# Patient Record
Sex: MALE | Race: WHITE | NOT HISPANIC OR LATINO | Employment: OTHER | URBAN - METROPOLITAN AREA
[De-identification: names, ages, dates, MRNs, and addresses within clinical notes are randomized per-mention and may not be internally consistent; named-entity substitution may affect disease eponyms.]

---

## 2023-10-20 RX ORDER — CREAM BASE NO.31
CREAM (GRAM) MISCELLANEOUS
Qty: 180 G | Refills: 11 | OUTPATIENT
Start: 2023-10-20

## 2024-06-07 ENCOUNTER — HOSPITAL ENCOUNTER (INPATIENT)
Facility: HOSPITAL | Age: 69
End: 2024-06-07
Attending: EMERGENCY MEDICINE | Admitting: SURGERY
Payer: MEDICARE

## 2024-06-07 ENCOUNTER — APPOINTMENT (OUTPATIENT)
Dept: RADIOLOGY | Facility: HOSPITAL | Age: 69
End: 2024-06-07
Payer: MEDICARE

## 2024-06-07 DIAGNOSIS — K35.30 ACUTE APPENDICITIS WITH LOCALIZED PERITONITIS, WITHOUT PERFORATION, ABSCESS, OR GANGRENE: ICD-10-CM

## 2024-06-07 DIAGNOSIS — I10 PRIMARY HYPERTENSION: ICD-10-CM

## 2024-06-07 DIAGNOSIS — K35.30 ACUTE APPENDICITIS WITH LOCALIZED PERITONITIS, WITHOUT PERFORATION OR ABSCESS, UNSPECIFIED WHETHER GANGRENE PRESENT: ICD-10-CM

## 2024-06-07 DIAGNOSIS — R10.84 ABDOMINAL PAIN, GENERALIZED: Primary | ICD-10-CM

## 2024-06-07 LAB
ALBUMIN SERPL BCP-MCNC: 4.3 G/DL (ref 3.4–5)
ALP SERPL-CCNC: 114 U/L (ref 33–136)
ALT SERPL W P-5'-P-CCNC: 12 U/L (ref 10–52)
ANION GAP SERPL CALC-SCNC: 11 MMOL/L (ref 10–20)
AST SERPL W P-5'-P-CCNC: 13 U/L (ref 9–39)
BASOPHILS # BLD AUTO: 0.03 X10*3/UL (ref 0–0.1)
BASOPHILS NFR BLD AUTO: 0.2 %
BILIRUB DIRECT SERPL-MCNC: 0.2 MG/DL (ref 0–0.3)
BILIRUB SERPL-MCNC: 1.1 MG/DL (ref 0–1.2)
BUN SERPL-MCNC: 14 MG/DL (ref 6–23)
CALCIUM SERPL-MCNC: 9.5 MG/DL (ref 8.6–10.3)
CHLORIDE SERPL-SCNC: 105 MMOL/L (ref 98–107)
CO2 SERPL-SCNC: 26 MMOL/L (ref 21–32)
CREAT SERPL-MCNC: 1.2 MG/DL (ref 0.5–1.3)
EGFRCR SERPLBLD CKD-EPI 2021: 66 ML/MIN/1.73M*2
EOSINOPHIL # BLD AUTO: 0.09 X10*3/UL (ref 0–0.7)
EOSINOPHIL NFR BLD AUTO: 0.7 %
ERYTHROCYTE [DISTWIDTH] IN BLOOD BY AUTOMATED COUNT: 13.3 % (ref 11.5–14.5)
GLUCOSE SERPL-MCNC: 105 MG/DL (ref 74–99)
HCT VFR BLD AUTO: 50.9 % (ref 41–52)
HGB BLD-MCNC: 17.3 G/DL (ref 13.5–17.5)
IMM GRANULOCYTES # BLD AUTO: 0.03 X10*3/UL (ref 0–0.7)
IMM GRANULOCYTES NFR BLD AUTO: 0.2 % (ref 0–0.9)
LIPASE SERPL-CCNC: 34 U/L (ref 9–82)
LYMPHOCYTES # BLD AUTO: 1.28 X10*3/UL (ref 1.2–4.8)
LYMPHOCYTES NFR BLD AUTO: 9.6 %
MCH RBC QN AUTO: 30.8 PG (ref 26–34)
MCHC RBC AUTO-ENTMCNC: 34 G/DL (ref 32–36)
MCV RBC AUTO: 91 FL (ref 80–100)
MONOCYTES # BLD AUTO: 0.69 X10*3/UL (ref 0.1–1)
MONOCYTES NFR BLD AUTO: 5.2 %
NEUTROPHILS # BLD AUTO: 11.16 X10*3/UL (ref 1.2–7.7)
NEUTROPHILS NFR BLD AUTO: 84.1 %
NRBC BLD-RTO: 0 /100 WBCS (ref 0–0)
PLATELET # BLD AUTO: 114 X10*3/UL (ref 150–450)
POTASSIUM SERPL-SCNC: 4.3 MMOL/L (ref 3.5–5.3)
PROT SERPL-MCNC: 7.4 G/DL (ref 6.4–8.2)
RBC # BLD AUTO: 5.61 X10*6/UL (ref 4.5–5.9)
SODIUM SERPL-SCNC: 138 MMOL/L (ref 136–145)
WBC # BLD AUTO: 13.3 X10*3/UL (ref 4.4–11.3)

## 2024-06-07 PROCEDURE — 2500000001 HC RX 250 WO HCPCS SELF ADMINISTERED DRUGS (ALT 637 FOR MEDICARE OP): Performed by: EMERGENCY MEDICINE

## 2024-06-07 PROCEDURE — 99285 EMERGENCY DEPT VISIT HI MDM: CPT | Mod: 25

## 2024-06-07 PROCEDURE — 80053 COMPREHEN METABOLIC PANEL: CPT | Performed by: EMERGENCY MEDICINE

## 2024-06-07 PROCEDURE — 2550000001 HC RX 255 CONTRASTS: Performed by: EMERGENCY MEDICINE

## 2024-06-07 PROCEDURE — 74177 CT ABD & PELVIS W/CONTRAST: CPT

## 2024-06-07 PROCEDURE — 36415 COLL VENOUS BLD VENIPUNCTURE: CPT | Performed by: EMERGENCY MEDICINE

## 2024-06-07 PROCEDURE — 99221 1ST HOSP IP/OBS SF/LOW 40: CPT | Performed by: SURGERY

## 2024-06-07 PROCEDURE — 2500000004 HC RX 250 GENERAL PHARMACY W/ HCPCS (ALT 636 FOR OP/ED): Performed by: EMERGENCY MEDICINE

## 2024-06-07 PROCEDURE — 96375 TX/PRO/DX INJ NEW DRUG ADDON: CPT

## 2024-06-07 PROCEDURE — 96372 THER/PROPH/DIAG INJ SC/IM: CPT | Performed by: SURGERY

## 2024-06-07 PROCEDURE — 82947 ASSAY GLUCOSE BLOOD QUANT: CPT

## 2024-06-07 PROCEDURE — 2500000004 HC RX 250 GENERAL PHARMACY W/ HCPCS (ALT 636 FOR OP/ED): Performed by: SURGERY

## 2024-06-07 PROCEDURE — 74177 CT ABD & PELVIS W/CONTRAST: CPT | Performed by: STUDENT IN AN ORGANIZED HEALTH CARE EDUCATION/TRAINING PROGRAM

## 2024-06-07 PROCEDURE — 96361 HYDRATE IV INFUSION ADD-ON: CPT

## 2024-06-07 PROCEDURE — 96365 THER/PROPH/DIAG IV INF INIT: CPT | Mod: 59

## 2024-06-07 PROCEDURE — 82248 BILIRUBIN DIRECT: CPT | Performed by: EMERGENCY MEDICINE

## 2024-06-07 PROCEDURE — 83690 ASSAY OF LIPASE: CPT | Performed by: EMERGENCY MEDICINE

## 2024-06-07 PROCEDURE — 85025 COMPLETE CBC W/AUTO DIFF WBC: CPT | Performed by: EMERGENCY MEDICINE

## 2024-06-07 RX ORDER — METOPROLOL TARTRATE 50 MG/1
50 TABLET ORAL 2 TIMES DAILY
Status: DISCONTINUED | OUTPATIENT
Start: 2024-06-07 | End: 2024-06-08

## 2024-06-07 RX ORDER — OXYCODONE HYDROCHLORIDE 10 MG/1
10 TABLET ORAL EVERY 4 HOURS PRN
Status: DISCONTINUED | OUTPATIENT
Start: 2024-06-07 | End: 2024-06-08

## 2024-06-07 RX ORDER — MORPHINE SULFATE 4 MG/ML
4 INJECTION INTRAVENOUS ONCE
Status: COMPLETED | OUTPATIENT
Start: 2024-06-07 | End: 2024-06-07

## 2024-06-07 RX ORDER — SODIUM CHLORIDE, SODIUM LACTATE, POTASSIUM CHLORIDE, CALCIUM CHLORIDE 600; 310; 30; 20 MG/100ML; MG/100ML; MG/100ML; MG/100ML
75 INJECTION, SOLUTION INTRAVENOUS CONTINUOUS
Status: DISCONTINUED | OUTPATIENT
Start: 2024-06-07 | End: 2024-06-09

## 2024-06-07 RX ORDER — INSULIN LISPRO 100 [IU]/ML
0-10 INJECTION, SOLUTION INTRAVENOUS; SUBCUTANEOUS EVERY 4 HOURS
Status: DISCONTINUED | OUTPATIENT
Start: 2024-06-07 | End: 2024-06-09

## 2024-06-07 RX ORDER — DEXTROSE 50 % IN WATER (D50W) INTRAVENOUS SYRINGE
25
Status: DISCONTINUED | OUTPATIENT
Start: 2024-06-07 | End: 2024-06-09

## 2024-06-07 RX ORDER — ONDANSETRON HYDROCHLORIDE 2 MG/ML
4 INJECTION, SOLUTION INTRAVENOUS ONCE
Status: COMPLETED | OUTPATIENT
Start: 2024-06-07 | End: 2024-06-07

## 2024-06-07 RX ORDER — HEPARIN SODIUM 5000 [USP'U]/ML
5000 INJECTION, SOLUTION INTRAVENOUS; SUBCUTANEOUS EVERY 8 HOURS
Status: DISCONTINUED | OUTPATIENT
Start: 2024-06-07 | End: 2024-06-09

## 2024-06-07 RX ORDER — TAMSULOSIN HYDROCHLORIDE 0.4 MG/1
0.4 CAPSULE ORAL DAILY
Status: DISCONTINUED | OUTPATIENT
Start: 2024-06-08 | End: 2024-06-08

## 2024-06-07 RX ORDER — METOPROLOL TARTRATE 1 MG/ML
5 INJECTION, SOLUTION INTRAVENOUS EVERY 6 HOURS PRN
Status: DISCONTINUED | OUTPATIENT
Start: 2024-06-07 | End: 2024-06-08

## 2024-06-07 RX ORDER — SPIRONOLACTONE 25 MG/1
25 TABLET ORAL DAILY
Status: DISCONTINUED | OUTPATIENT
Start: 2024-06-08 | End: 2024-06-08

## 2024-06-07 RX ORDER — ONDANSETRON HYDROCHLORIDE 2 MG/ML
4 INJECTION, SOLUTION INTRAVENOUS EVERY 6 HOURS PRN
Status: DISCONTINUED | OUTPATIENT
Start: 2024-06-07 | End: 2024-06-13 | Stop reason: HOSPADM

## 2024-06-07 RX ORDER — METOPROLOL TARTRATE 50 MG/1
50 TABLET ORAL ONCE
Status: COMPLETED | OUTPATIENT
Start: 2024-06-07 | End: 2024-06-07

## 2024-06-07 RX ORDER — OXYCODONE HYDROCHLORIDE 5 MG/1
5 TABLET ORAL EVERY 4 HOURS PRN
Status: DISCONTINUED | OUTPATIENT
Start: 2024-06-07 | End: 2024-06-08

## 2024-06-07 RX ORDER — ACETAMINOPHEN 325 MG/1
975 TABLET ORAL EVERY 8 HOURS
Status: DISCONTINUED | OUTPATIENT
Start: 2024-06-07 | End: 2024-06-08

## 2024-06-07 RX ORDER — HYDRALAZINE HYDROCHLORIDE 20 MG/ML
5 INJECTION INTRAMUSCULAR; INTRAVENOUS EVERY 6 HOURS PRN
Status: DISCONTINUED | OUTPATIENT
Start: 2024-06-07 | End: 2024-06-08

## 2024-06-07 RX ORDER — DEXTROSE 50 % IN WATER (D50W) INTRAVENOUS SYRINGE
12.5
Status: DISCONTINUED | OUTPATIENT
Start: 2024-06-07 | End: 2024-06-09

## 2024-06-07 RX ADMIN — SODIUM CHLORIDE, POTASSIUM CHLORIDE, SODIUM LACTATE AND CALCIUM CHLORIDE 1000 ML: 600; 310; 30; 20 INJECTION, SOLUTION INTRAVENOUS at 23:17

## 2024-06-07 RX ADMIN — METOPROLOL TARTRATE 50 MG: 50 TABLET, FILM COATED ORAL at 22:22

## 2024-06-07 RX ADMIN — ONDANSETRON 4 MG: 2 INJECTION INTRAMUSCULAR; INTRAVENOUS at 19:35

## 2024-06-07 RX ADMIN — MORPHINE SULFATE 4 MG: 4 INJECTION INTRAVENOUS at 19:36

## 2024-06-07 RX ADMIN — HYDROMORPHONE HYDROCHLORIDE 0.4 MG: 1 INJECTION, SOLUTION INTRAMUSCULAR; INTRAVENOUS; SUBCUTANEOUS at 23:17

## 2024-06-07 RX ADMIN — IOHEXOL 100 ML: 350 INJECTION, SOLUTION INTRAVENOUS at 20:23

## 2024-06-07 RX ADMIN — PIPERACILLIN SODIUM AND TAZOBACTAM SODIUM 3.38 G: 3; .375 INJECTION, SOLUTION INTRAVENOUS at 21:30

## 2024-06-07 RX ADMIN — HEPARIN SODIUM 5000 UNITS: 5000 INJECTION INTRAVENOUS; SUBCUTANEOUS at 23:46

## 2024-06-07 RX ADMIN — ACETAMINOPHEN 975 MG: 325 TABLET ORAL at 23:47

## 2024-06-07 ASSESSMENT — COLUMBIA-SUICIDE SEVERITY RATING SCALE - C-SSRS
1. IN THE PAST MONTH, HAVE YOU WISHED YOU WERE DEAD OR WISHED YOU COULD GO TO SLEEP AND NOT WAKE UP?: NO
2. HAVE YOU ACTUALLY HAD ANY THOUGHTS OF KILLING YOURSELF?: NO
6. HAVE YOU EVER DONE ANYTHING, STARTED TO DO ANYTHING, OR PREPARED TO DO ANYTHING TO END YOUR LIFE?: NO

## 2024-06-07 ASSESSMENT — LIFESTYLE VARIABLES
TOTAL SCORE: 0
EVER FELT BAD OR GUILTY ABOUT YOUR DRINKING: NO
HAVE YOU EVER FELT YOU SHOULD CUT DOWN ON YOUR DRINKING: NO
EVER HAD A DRINK FIRST THING IN THE MORNING TO STEADY YOUR NERVES TO GET RID OF A HANGOVER: NO
HAVE PEOPLE ANNOYED YOU BY CRITICIZING YOUR DRINKING: NO

## 2024-06-07 ASSESSMENT — PAIN DESCRIPTION - LOCATION: LOCATION: ABDOMEN

## 2024-06-07 ASSESSMENT — PAIN SCALES - GENERAL
PAINLEVEL_OUTOF10: 6
PAINLEVEL_OUTOF10: 4
PAINLEVEL_OUTOF10: 0 - NO PAIN

## 2024-06-07 ASSESSMENT — PAIN - FUNCTIONAL ASSESSMENT
PAIN_FUNCTIONAL_ASSESSMENT: 0-10
PAIN_FUNCTIONAL_ASSESSMENT: 0-10

## 2024-06-07 ASSESSMENT — PAIN DESCRIPTION - PROGRESSION: CLINICAL_PROGRESSION: GRADUALLY IMPROVING

## 2024-06-07 NOTE — ED PROVIDER NOTES
HPI   Chief Complaint   Patient presents with    Abdominal Pain     Constipation, nausea    Hypertension    Shortness of Breath     All sx started today,        Patient presents with abdominal pain.  It started earlier this morning.  He describes diffuse abdominal pain and cramping.  He feels distended.  He has had nausea without any vomiting.  He states his bowel movements have slowed down from normal.  He took some stool softeners last night and this morning.  He admits to having a small bowel movement when he checked into the hospital here today.  He has a history of a small bowel obstruction and had surgery at [facility] to have bowel removed.  I did review the discharge summary from December 2021.  He was admitted for retroperitoneal hemorrhage at that time.  He denies any recent fevers.                          Cassi Coma Scale Score: 15                  Patient History   Past Medical History:   Diagnosis Date    Aortic aneurysm of unspecified site, without rupture (CMS-HCC)     Aneurysm of descending aorta    Essential (primary) hypertension 12/04/2013    Benign essential hypertension    Personal history of malignant neoplasm, unspecified     History of malignant neoplasm    Personal history of other diseases of the circulatory system     History of hypertension    Personal history of other endocrine, nutritional and metabolic disease     History of high cholesterol    Personal history of other specified conditions 06/18/2018    History of gross hematuria    Type 2 diabetes mellitus without complications (Multi) 12/04/2013    Diabetes mellitus     Past Surgical History:   Procedure Laterality Date    HERNIA REPAIR  06/18/2018    Hernia Repair    OTHER SURGICAL HISTORY  10/04/2021    Gallbladder surgery    OTHER SURGICAL HISTORY  10/04/2021    Abdominal surgery    OTHER SURGICAL HISTORY  10/21/2022    Small bowel resection     No family history on file.  Social History     Tobacco Use     Smoking status: Not on file    Smokeless tobacco: Not on file   Substance Use Topics    Alcohol use: Not on file    Drug use: Not on file       Physical Exam   ED Triage Vitals [06/07/24 1906]   Temperature Heart Rate Respirations BP   36.4 °C (97.5 °F) 70 18 (!) 213/99      Pulse Ox Temp src Heart Rate Source Patient Position   97 % -- -- --      BP Location FiO2 (%)     -- --       Physical Exam  Vitals and nursing note reviewed.   Constitutional:       Appearance: Normal appearance.   HENT:      Head: Normocephalic and atraumatic.      Mouth/Throat:      Mouth: Mucous membranes are moist.   Eyes:      Extraocular Movements: Extraocular movements intact.      Pupils: Pupils are equal, round, and reactive to light.   Cardiovascular:      Rate and Rhythm: Normal rate and regular rhythm.      Heart sounds: No murmur heard.  Pulmonary:      Effort: Pulmonary effort is normal. No respiratory distress.      Breath sounds: Normal breath sounds.   Abdominal:      General: There is no distension.      Palpations: Abdomen is soft.      Tenderness: There is generalized abdominal tenderness. There is no guarding or rebound.   Musculoskeletal:         General: No tenderness or deformity. Normal range of motion.      Cervical back: Neck supple.      Right lower leg: No edema.      Left lower leg: No edema.   Skin:     General: Skin is warm and dry.      Findings: No lesion or rash.   Neurological:      General: No focal deficit present.      Mental Status: He is alert and oriented to person, place, and time.      Sensory: No sensory deficit.      Motor: No weakness.   Psychiatric:         Behavior: Behavior normal.       Labs Reviewed   LIPASE   HEPATIC FUNCTION PANEL   BASIC METABOLIC PANEL   CBC WITH AUTO DIFFERENTIAL     CT abdomen pelvis w IV contrast    (Results Pending)     ED Course & MDM   Diagnoses as of 06/07/24 2046   Abdominal pain, generalized       Medical Decision Making  Differentials include small bowel  obstruction, gastroenteritis, unspecified abdominal pain, retroperitoneal hematoma.  Imaging studies, if performed, were independently reviewed and interpreted by myself and confirmed by radiologist. EKG(s), if performed, were interpreted by myself.Patient was given morphine and Zofran for pain control.  Laboratory studies were obtained shows a white count of 13.3, otherwise unremarkable.  CAT scan of the abdomen pelvis was obtained and is currently pending.  Patient will be reevaluated by the oncoming physician.        Procedure  Procedures     Dion De Leon MD  06/07/24 2055

## 2024-06-08 ENCOUNTER — ANESTHESIA (OUTPATIENT)
Dept: OPERATING ROOM | Facility: HOSPITAL | Age: 69
End: 2024-06-08
Payer: MEDICARE

## 2024-06-08 ENCOUNTER — APPOINTMENT (OUTPATIENT)
Dept: CARDIOLOGY | Facility: HOSPITAL | Age: 69
End: 2024-06-08
Payer: MEDICARE

## 2024-06-08 ENCOUNTER — APPOINTMENT (OUTPATIENT)
Dept: RADIOLOGY | Facility: HOSPITAL | Age: 69
End: 2024-06-08
Payer: MEDICARE

## 2024-06-08 ENCOUNTER — ANESTHESIA EVENT (OUTPATIENT)
Dept: OPERATING ROOM | Facility: HOSPITAL | Age: 69
End: 2024-06-08
Payer: MEDICARE

## 2024-06-08 PROBLEM — I10 PRIMARY HYPERTENSION: Status: ACTIVE | Noted: 2024-06-08

## 2024-06-08 PROBLEM — K44.9 HIATAL HERNIA: Status: ACTIVE | Noted: 2024-06-08

## 2024-06-08 PROBLEM — R10.84 ABDOMINAL PAIN, GENERALIZED: Status: ACTIVE | Noted: 2024-06-08

## 2024-06-08 PROBLEM — G47.33 OSA (OBSTRUCTIVE SLEEP APNEA): Status: ACTIVE | Noted: 2024-06-08

## 2024-06-08 PROBLEM — E11.65 TYPE 2 DIABETES MELLITUS WITH HYPERGLYCEMIA (MULTI): Status: ACTIVE | Noted: 2024-06-08

## 2024-06-08 LAB
ANION GAP SERPL CALC-SCNC: 12 MMOL/L (ref 10–20)
ANION GAP SERPL CALC-SCNC: 14 MMOL/L (ref 10–20)
BASOPHILS # BLD AUTO: 0.03 X10*3/UL (ref 0–0.1)
BASOPHILS # BLD AUTO: 0.05 X10*3/UL (ref 0–0.1)
BASOPHILS NFR BLD AUTO: 0.2 %
BASOPHILS NFR BLD AUTO: 0.3 %
BUN SERPL-MCNC: 14 MG/DL (ref 6–23)
BUN SERPL-MCNC: 15 MG/DL (ref 6–23)
CALCIUM SERPL-MCNC: 8.2 MG/DL (ref 8.6–10.3)
CALCIUM SERPL-MCNC: 8.3 MG/DL (ref 8.6–10.3)
CHLORIDE SERPL-SCNC: 103 MMOL/L (ref 98–107)
CHLORIDE SERPL-SCNC: 105 MMOL/L (ref 98–107)
CO2 SERPL-SCNC: 23 MMOL/L (ref 21–32)
CO2 SERPL-SCNC: 23 MMOL/L (ref 21–32)
CREAT SERPL-MCNC: 1.31 MG/DL (ref 0.5–1.3)
CREAT SERPL-MCNC: 1.55 MG/DL (ref 0.5–1.3)
EGFRCR SERPLBLD CKD-EPI 2021: 49 ML/MIN/1.73M*2
EGFRCR SERPLBLD CKD-EPI 2021: 60 ML/MIN/1.73M*2
EOSINOPHIL # BLD AUTO: 0 X10*3/UL (ref 0–0.7)
EOSINOPHIL # BLD AUTO: 0.02 X10*3/UL (ref 0–0.7)
EOSINOPHIL NFR BLD AUTO: 0 %
EOSINOPHIL NFR BLD AUTO: 0.1 %
ERYTHROCYTE [DISTWIDTH] IN BLOOD BY AUTOMATED COUNT: 13.4 % (ref 11.5–14.5)
ERYTHROCYTE [DISTWIDTH] IN BLOOD BY AUTOMATED COUNT: 13.6 % (ref 11.5–14.5)
GLUCOSE BLD MANUAL STRIP-MCNC: 141 MG/DL (ref 74–99)
GLUCOSE BLD MANUAL STRIP-MCNC: 148 MG/DL (ref 74–99)
GLUCOSE BLD MANUAL STRIP-MCNC: 156 MG/DL (ref 74–99)
GLUCOSE BLD MANUAL STRIP-MCNC: 209 MG/DL (ref 74–99)
GLUCOSE SERPL-MCNC: 153 MG/DL (ref 74–99)
GLUCOSE SERPL-MCNC: 200 MG/DL (ref 74–99)
HCT VFR BLD AUTO: 44.3 % (ref 41–52)
HCT VFR BLD AUTO: 48.5 % (ref 41–52)
HGB BLD-MCNC: 15.2 G/DL (ref 13.5–17.5)
HGB BLD-MCNC: 16.2 G/DL (ref 13.5–17.5)
IMM GRANULOCYTES # BLD AUTO: 0.06 X10*3/UL (ref 0–0.7)
IMM GRANULOCYTES # BLD AUTO: 0.08 X10*3/UL (ref 0–0.7)
IMM GRANULOCYTES NFR BLD AUTO: 0.4 % (ref 0–0.9)
IMM GRANULOCYTES NFR BLD AUTO: 0.5 % (ref 0–0.9)
LYMPHOCYTES # BLD AUTO: 0.63 X10*3/UL (ref 1.2–4.8)
LYMPHOCYTES # BLD AUTO: 0.87 X10*3/UL (ref 1.2–4.8)
LYMPHOCYTES NFR BLD AUTO: 4.1 %
LYMPHOCYTES NFR BLD AUTO: 6.5 %
MAGNESIUM SERPL-MCNC: 1.62 MG/DL (ref 1.6–2.4)
MAGNESIUM SERPL-MCNC: 1.71 MG/DL (ref 1.6–2.4)
MCH RBC QN AUTO: 30.6 PG (ref 26–34)
MCH RBC QN AUTO: 30.9 PG (ref 26–34)
MCHC RBC AUTO-ENTMCNC: 33.4 G/DL (ref 32–36)
MCHC RBC AUTO-ENTMCNC: 34.3 G/DL (ref 32–36)
MCV RBC AUTO: 89 FL (ref 80–100)
MCV RBC AUTO: 92 FL (ref 80–100)
MONOCYTES # BLD AUTO: 0.73 X10*3/UL (ref 0.1–1)
MONOCYTES # BLD AUTO: 0.98 X10*3/UL (ref 0.1–1)
MONOCYTES NFR BLD AUTO: 5.4 %
MONOCYTES NFR BLD AUTO: 6.4 %
NEUTROPHILS # BLD AUTO: 11.74 X10*3/UL (ref 1.2–7.7)
NEUTROPHILS # BLD AUTO: 13.5 X10*3/UL (ref 1.2–7.7)
NEUTROPHILS NFR BLD AUTO: 87.5 %
NEUTROPHILS NFR BLD AUTO: 88.6 %
NRBC BLD-RTO: 0 /100 WBCS (ref 0–0)
NRBC BLD-RTO: 0 /100 WBCS (ref 0–0)
PHOSPHATE SERPL-MCNC: 2.7 MG/DL (ref 2.5–4.9)
PHOSPHATE SERPL-MCNC: 4 MG/DL (ref 2.5–4.9)
PLATELET # BLD AUTO: 100 X10*3/UL (ref 150–450)
PLATELET # BLD AUTO: 112 X10*3/UL (ref 150–450)
POTASSIUM SERPL-SCNC: 4.1 MMOL/L (ref 3.5–5.3)
POTASSIUM SERPL-SCNC: 4.2 MMOL/L (ref 3.5–5.3)
RBC # BLD AUTO: 4.97 X10*6/UL (ref 4.5–5.9)
RBC # BLD AUTO: 5.25 X10*6/UL (ref 4.5–5.9)
RBC MORPH BLD: NORMAL
SODIUM SERPL-SCNC: 136 MMOL/L (ref 136–145)
SODIUM SERPL-SCNC: 136 MMOL/L (ref 136–145)
WBC # BLD AUTO: 13.4 X10*3/UL (ref 4.4–11.3)
WBC # BLD AUTO: 15.3 X10*3/UL (ref 4.4–11.3)

## 2024-06-08 PROCEDURE — 7100000001 HC RECOVERY ROOM TIME - INITIAL BASE CHARGE: Performed by: SURGERY

## 2024-06-08 PROCEDURE — 0DNH0ZZ RELEASE CECUM, OPEN APPROACH: ICD-10-PCS | Performed by: SURGERY

## 2024-06-08 PROCEDURE — 36415 COLL VENOUS BLD VENIPUNCTURE: CPT | Performed by: SURGERY

## 2024-06-08 PROCEDURE — 99233 SBSQ HOSP IP/OBS HIGH 50: CPT | Performed by: FAMILY MEDICINE

## 2024-06-08 PROCEDURE — 0DTJ0ZZ RESECTION OF APPENDIX, OPEN APPROACH: ICD-10-PCS | Performed by: SURGERY

## 2024-06-08 PROCEDURE — 74018 RADEX ABDOMEN 1 VIEW: CPT | Performed by: RADIOLOGY

## 2024-06-08 PROCEDURE — 3700000002 HC GENERAL ANESTHESIA TIME - EACH INCREMENTAL 1 MINUTE: Performed by: SURGERY

## 2024-06-08 PROCEDURE — 0WJG4ZZ INSPECTION OF PERITONEAL CAVITY, PERCUTANEOUS ENDOSCOPIC APPROACH: ICD-10-PCS | Performed by: SURGERY

## 2024-06-08 PROCEDURE — 83735 ASSAY OF MAGNESIUM: CPT | Performed by: SURGERY

## 2024-06-08 PROCEDURE — 7100000002 HC RECOVERY ROOM TIME - EACH INCREMENTAL 1 MINUTE: Performed by: SURGERY

## 2024-06-08 PROCEDURE — 2500000004 HC RX 250 GENERAL PHARMACY W/ HCPCS (ALT 636 FOR OP/ED): Performed by: ANESTHESIOLOGY

## 2024-06-08 PROCEDURE — 93005 ELECTROCARDIOGRAM TRACING: CPT

## 2024-06-08 PROCEDURE — 0DNW0ZZ RELEASE PERITONEUM, OPEN APPROACH: ICD-10-PCS | Performed by: SURGERY

## 2024-06-08 PROCEDURE — 85025 COMPLETE CBC W/AUTO DIFF WBC: CPT | Performed by: SURGERY

## 2024-06-08 PROCEDURE — 71045 X-RAY EXAM CHEST 1 VIEW: CPT

## 2024-06-08 PROCEDURE — 80048 BASIC METABOLIC PNL TOTAL CA: CPT | Performed by: SURGERY

## 2024-06-08 PROCEDURE — C9113 INJ PANTOPRAZOLE SODIUM, VIA: HCPCS | Performed by: SURGERY

## 2024-06-08 PROCEDURE — 0DNF0ZZ RELEASE RIGHT LARGE INTESTINE, OPEN APPROACH: ICD-10-PCS | Performed by: SURGERY

## 2024-06-08 PROCEDURE — 2500000004 HC RX 250 GENERAL PHARMACY W/ HCPCS (ALT 636 FOR OP/ED): Performed by: NURSE ANESTHETIST, CERTIFIED REGISTERED

## 2024-06-08 PROCEDURE — 2500000004 HC RX 250 GENERAL PHARMACY W/ HCPCS (ALT 636 FOR OP/ED): Performed by: FAMILY MEDICINE

## 2024-06-08 PROCEDURE — 3600000004 HC OR TIME - INITIAL BASE CHARGE - PROCEDURE LEVEL FOUR: Performed by: SURGERY

## 2024-06-08 PROCEDURE — 2500000004 HC RX 250 GENERAL PHARMACY W/ HCPCS (ALT 636 FOR OP/ED): Performed by: SURGERY

## 2024-06-08 PROCEDURE — 82947 ASSAY GLUCOSE BLOOD QUANT: CPT

## 2024-06-08 PROCEDURE — 71045 X-RAY EXAM CHEST 1 VIEW: CPT | Performed by: RADIOLOGY

## 2024-06-08 PROCEDURE — 74018 RADEX ABDOMEN 1 VIEW: CPT

## 2024-06-08 PROCEDURE — 2500000002 HC RX 250 W HCPCS SELF ADMINISTERED DRUGS (ALT 637 FOR MEDICARE OP, ALT 636 FOR OP/ED): Performed by: SURGERY

## 2024-06-08 PROCEDURE — 2500000005 HC RX 250 GENERAL PHARMACY W/O HCPCS: Performed by: NURSE ANESTHETIST, CERTIFIED REGISTERED

## 2024-06-08 PROCEDURE — 84100 ASSAY OF PHOSPHORUS: CPT | Performed by: SURGERY

## 2024-06-08 PROCEDURE — 2500000005 HC RX 250 GENERAL PHARMACY W/O HCPCS: Performed by: SURGERY

## 2024-06-08 PROCEDURE — 2500000005 HC RX 250 GENERAL PHARMACY W/O HCPCS: Performed by: ANESTHESIOLOGY

## 2024-06-08 PROCEDURE — 44950 APPENDECTOMY: CPT | Performed by: SURGERY

## 2024-06-08 PROCEDURE — 3600000009 HC OR TIME - EACH INCREMENTAL 1 MINUTE - PROCEDURE LEVEL FOUR: Performed by: SURGERY

## 2024-06-08 PROCEDURE — 2020000001 HC ICU ROOM DAILY

## 2024-06-08 PROCEDURE — 2720000007 HC OR 272 NO HCPCS: Performed by: SURGERY

## 2024-06-08 PROCEDURE — 3700000001 HC GENERAL ANESTHESIA TIME - INITIAL BASE CHARGE: Performed by: SURGERY

## 2024-06-08 RX ORDER — BUPIVACAINE HYDROCHLORIDE AND EPINEPHRINE 5; 5 MG/ML; UG/ML
INJECTION, SOLUTION EPIDURAL; INTRACAUDAL; PERINEURAL AS NEEDED
Status: DISCONTINUED | OUTPATIENT
Start: 2024-06-08 | End: 2024-06-08 | Stop reason: HOSPADM

## 2024-06-08 RX ORDER — FENTANYL CITRATE 50 UG/ML
INJECTION, SOLUTION INTRAMUSCULAR; INTRAVENOUS AS NEEDED
Status: DISCONTINUED | OUTPATIENT
Start: 2024-06-08 | End: 2024-06-08

## 2024-06-08 RX ORDER — SODIUM CHLORIDE 9 MG/ML
50 INJECTION, SOLUTION INTRAVENOUS CONTINUOUS
Status: DISCONTINUED | OUTPATIENT
Start: 2024-06-08 | End: 2024-06-08

## 2024-06-08 RX ORDER — HYDROMORPHONE HYDROCHLORIDE 1 MG/ML
0.6 INJECTION, SOLUTION INTRAMUSCULAR; INTRAVENOUS; SUBCUTANEOUS EVERY 6 HOURS PRN
Status: DISCONTINUED | OUTPATIENT
Start: 2024-06-08 | End: 2024-06-10

## 2024-06-08 RX ORDER — MORPHINE SULFATE 4 MG/ML
4 INJECTION INTRAVENOUS EVERY 5 MIN PRN
Status: DISCONTINUED | OUTPATIENT
Start: 2024-06-08 | End: 2024-06-08 | Stop reason: HOSPADM

## 2024-06-08 RX ORDER — MIDAZOLAM HYDROCHLORIDE 1 MG/ML
INJECTION, SOLUTION INTRAMUSCULAR; INTRAVENOUS AS NEEDED
Status: DISCONTINUED | OUTPATIENT
Start: 2024-06-08 | End: 2024-06-08

## 2024-06-08 RX ORDER — METOPROLOL TARTRATE 1 MG/ML
5 INJECTION, SOLUTION INTRAVENOUS EVERY 4 HOURS PRN
Status: DISCONTINUED | OUTPATIENT
Start: 2024-06-08 | End: 2024-06-08

## 2024-06-08 RX ORDER — ACETAMINOPHEN 10 MG/ML
1000 INJECTION, SOLUTION INTRAVENOUS EVERY 8 HOURS
Status: COMPLETED | OUTPATIENT
Start: 2024-06-08 | End: 2024-06-09

## 2024-06-08 RX ORDER — HYDRALAZINE HYDROCHLORIDE 20 MG/ML
5 INJECTION INTRAMUSCULAR; INTRAVENOUS ONCE
Status: DISCONTINUED | OUTPATIENT
Start: 2024-06-08 | End: 2024-06-08 | Stop reason: HOSPADM

## 2024-06-08 RX ORDER — METOPROLOL TARTRATE 1 MG/ML
10 INJECTION, SOLUTION INTRAVENOUS EVERY 6 HOURS
Status: DISCONTINUED | OUTPATIENT
Start: 2024-06-08 | End: 2024-06-09

## 2024-06-08 RX ORDER — PHENYLEPHRINE HCL IN 0.9% NACL 0.4MG/10ML
SYRINGE (ML) INTRAVENOUS AS NEEDED
Status: DISCONTINUED | OUTPATIENT
Start: 2024-06-08 | End: 2024-06-08

## 2024-06-08 RX ORDER — METOCLOPRAMIDE HYDROCHLORIDE 5 MG/ML
10 INJECTION INTRAMUSCULAR; INTRAVENOUS ONCE
Status: DISCONTINUED | OUTPATIENT
Start: 2024-06-08 | End: 2024-06-08 | Stop reason: HOSPADM

## 2024-06-08 RX ORDER — ROCURONIUM BROMIDE 10 MG/ML
INJECTION, SOLUTION INTRAVENOUS AS NEEDED
Status: DISCONTINUED | OUTPATIENT
Start: 2024-06-08 | End: 2024-06-08

## 2024-06-08 RX ORDER — FAMOTIDINE 10 MG/ML
INJECTION INTRAVENOUS AS NEEDED
Status: DISCONTINUED | OUTPATIENT
Start: 2024-06-08 | End: 2024-06-08

## 2024-06-08 RX ORDER — ONDANSETRON HYDROCHLORIDE 2 MG/ML
4 INJECTION, SOLUTION INTRAVENOUS ONCE AS NEEDED
Status: DISCONTINUED | OUTPATIENT
Start: 2024-06-08 | End: 2024-06-08 | Stop reason: HOSPADM

## 2024-06-08 RX ORDER — PROPOFOL 10 MG/ML
INJECTION, EMULSION INTRAVENOUS AS NEEDED
Status: DISCONTINUED | OUTPATIENT
Start: 2024-06-08 | End: 2024-06-08

## 2024-06-08 RX ORDER — MAGNESIUM SULFATE HEPTAHYDRATE 40 MG/ML
2 INJECTION, SOLUTION INTRAVENOUS ONCE
Status: COMPLETED | OUTPATIENT
Start: 2024-06-08 | End: 2024-06-08

## 2024-06-08 RX ORDER — HYDRALAZINE HYDROCHLORIDE 20 MG/ML
10 INJECTION INTRAMUSCULAR; INTRAVENOUS EVERY 6 HOURS PRN
Status: DISCONTINUED | OUTPATIENT
Start: 2024-06-08 | End: 2024-06-09

## 2024-06-08 RX ORDER — METOCLOPRAMIDE HYDROCHLORIDE 5 MG/ML
INJECTION INTRAMUSCULAR; INTRAVENOUS AS NEEDED
Status: DISCONTINUED | OUTPATIENT
Start: 2024-06-08 | End: 2024-06-08

## 2024-06-08 RX ORDER — PANTOPRAZOLE SODIUM 40 MG/10ML
40 INJECTION, POWDER, LYOPHILIZED, FOR SOLUTION INTRAVENOUS DAILY
Status: DISCONTINUED | OUTPATIENT
Start: 2024-06-08 | End: 2024-06-09

## 2024-06-08 RX ORDER — LIDOCAINE HYDROCHLORIDE 20 MG/ML
INJECTION, SOLUTION INFILTRATION; PERINEURAL AS NEEDED
Status: DISCONTINUED | OUTPATIENT
Start: 2024-06-08 | End: 2024-06-08

## 2024-06-08 RX ORDER — ROCURONIUM BROMIDE 50 MG/5 ML
SYRINGE (ML) INTRAVENOUS AS NEEDED
Status: DISCONTINUED | OUTPATIENT
Start: 2024-06-08 | End: 2024-06-08

## 2024-06-08 RX ORDER — SODIUM CHLORIDE 9 MG/ML
INJECTION, SOLUTION INTRAVENOUS CONTINUOUS PRN
Status: DISCONTINUED | OUTPATIENT
Start: 2024-06-08 | End: 2024-06-08

## 2024-06-08 RX ORDER — HYDROMORPHONE HYDROCHLORIDE 0.2 MG/ML
0.2 INJECTION INTRAMUSCULAR; INTRAVENOUS; SUBCUTANEOUS EVERY 4 HOURS PRN
Status: DISCONTINUED | OUTPATIENT
Start: 2024-06-08 | End: 2024-06-09

## 2024-06-08 RX ORDER — FAMOTIDINE 10 MG/ML
20 INJECTION INTRAVENOUS ONCE
Status: DISCONTINUED | OUTPATIENT
Start: 2024-06-08 | End: 2024-06-08 | Stop reason: HOSPADM

## 2024-06-08 RX ORDER — MORPHINE SULFATE 2 MG/ML
2 INJECTION, SOLUTION INTRAMUSCULAR; INTRAVENOUS EVERY 5 MIN PRN
Status: DISCONTINUED | OUTPATIENT
Start: 2024-06-08 | End: 2024-06-08 | Stop reason: HOSPADM

## 2024-06-08 RX ADMIN — METOPROLOL TARTRATE 10 MG: 5 INJECTION INTRAVENOUS at 15:52

## 2024-06-08 RX ADMIN — PIPERACILLIN SODIUM AND TAZOBACTAM SODIUM 3.38 G: 3; .375 INJECTION, SOLUTION INTRAVENOUS at 09:19

## 2024-06-08 RX ADMIN — FENTANYL CITRATE 50 MCG: 50 INJECTION INTRAMUSCULAR; INTRAVENOUS at 13:10

## 2024-06-08 RX ADMIN — SODIUM CHLORIDE, POTASSIUM CHLORIDE, SODIUM LACTATE AND CALCIUM CHLORIDE 125 ML/HR: 600; 310; 30; 20 INJECTION, SOLUTION INTRAVENOUS at 15:54

## 2024-06-08 RX ADMIN — ONDANSETRON 4 MG: 2 INJECTION INTRAMUSCULAR; INTRAVENOUS at 09:44

## 2024-06-08 RX ADMIN — CALCIUM CHLORIDE 1 G: 100 INJECTION INTRAVENOUS; INTRAVENTRICULAR at 21:22

## 2024-06-08 RX ADMIN — MORPHINE SULFATE 4 MG: 4 INJECTION INTRAVENOUS at 14:36

## 2024-06-08 RX ADMIN — INSULIN LISPRO 4 UNITS: 100 INJECTION, SOLUTION INTRAVENOUS; SUBCUTANEOUS at 15:54

## 2024-06-08 RX ADMIN — MORPHINE SULFATE 4 MG: 4 INJECTION INTRAVENOUS at 14:30

## 2024-06-08 RX ADMIN — LIDOCAINE HYDROCHLORIDE 60 MG: 20 INJECTION, SOLUTION INFILTRATION; PERINEURAL at 09:30

## 2024-06-08 RX ADMIN — PROPOFOL 200 MG: 10 INJECTION, EMULSION INTRAVENOUS at 09:31

## 2024-06-08 RX ADMIN — Medication 2 L/MIN: at 15:45

## 2024-06-08 RX ADMIN — ROCURONIUM BROMIDE 20 MG: 10 INJECTION, SOLUTION INTRAVENOUS at 11:21

## 2024-06-08 RX ADMIN — FENTANYL CITRATE 50 MCG: 50 INJECTION INTRAMUSCULAR; INTRAVENOUS at 10:39

## 2024-06-08 RX ADMIN — SODIUM CHLORIDE, POTASSIUM CHLORIDE, SODIUM LACTATE AND CALCIUM CHLORIDE 125 ML/HR: 600; 310; 30; 20 INJECTION, SOLUTION INTRAVENOUS at 00:56

## 2024-06-08 RX ADMIN — Medication 80 MCG: at 10:10

## 2024-06-08 RX ADMIN — ROCURONIUM BROMIDE 20 MG: 10 INJECTION, SOLUTION INTRAVENOUS at 10:15

## 2024-06-08 RX ADMIN — HYDRALAZINE HYDROCHLORIDE 5 MG: 20 INJECTION INTRAMUSCULAR; INTRAVENOUS at 14:46

## 2024-06-08 RX ADMIN — ROCURONIUM BROMIDE 20 MG: 10 INJECTION, SOLUTION INTRAVENOUS at 11:11

## 2024-06-08 RX ADMIN — FENTANYL CITRATE 50 MCG: 50 INJECTION INTRAMUSCULAR; INTRAVENOUS at 12:28

## 2024-06-08 RX ADMIN — PANTOPRAZOLE SODIUM 40 MG: 40 INJECTION, POWDER, FOR SOLUTION INTRAVENOUS at 15:52

## 2024-06-08 RX ADMIN — PIPERACILLIN SODIUM AND TAZOBACTAM SODIUM 3.38 G: 3; .375 INJECTION, SOLUTION INTRAVENOUS at 20:49

## 2024-06-08 RX ADMIN — INSULIN LISPRO 2 UNITS: 100 INJECTION, SOLUTION INTRAVENOUS; SUBCUTANEOUS at 20:29

## 2024-06-08 RX ADMIN — FENTANYL CITRATE 50 MCG: 50 INJECTION INTRAMUSCULAR; INTRAVENOUS at 11:11

## 2024-06-08 RX ADMIN — HYDROMORPHONE HYDROCHLORIDE 0.2 MG: 0.2 INJECTION, SOLUTION INTRAMUSCULAR; INTRAVENOUS; SUBCUTANEOUS at 20:22

## 2024-06-08 RX ADMIN — METOPROLOL TARTRATE 10 MG: 5 INJECTION INTRAVENOUS at 20:49

## 2024-06-08 RX ADMIN — ACETAMINOPHEN 1000 MG: 10 INJECTION INTRAVENOUS at 17:13

## 2024-06-08 RX ADMIN — MORPHINE SULFATE 4 MG: 4 INJECTION INTRAVENOUS at 14:24

## 2024-06-08 RX ADMIN — MIDAZOLAM 2 MG: 1 INJECTION INTRAMUSCULAR; INTRAVENOUS at 09:31

## 2024-06-08 RX ADMIN — FAMOTIDINE 20 MG: 10 INJECTION, SOLUTION INTRAVENOUS at 09:31

## 2024-06-08 RX ADMIN — ROCURONIUM BROMIDE 20 MG: 10 INJECTION, SOLUTION INTRAVENOUS at 12:18

## 2024-06-08 RX ADMIN — MAGNESIUM SULFATE HEPTAHYDRATE 2 G: 40 INJECTION, SOLUTION INTRAVENOUS at 20:23

## 2024-06-08 RX ADMIN — HYDRALAZINE HYDROCHLORIDE 10 MG: 20 INJECTION INTRAMUSCULAR; INTRAVENOUS at 17:18

## 2024-06-08 RX ADMIN — ROCURONIUM BROMIDE 50 MG: 10 INJECTION, SOLUTION INTRAVENOUS at 09:30

## 2024-06-08 RX ADMIN — PIPERACILLIN SODIUM AND TAZOBACTAM SODIUM 3.38 G: 3; .375 INJECTION, SOLUTION INTRAVENOUS at 03:47

## 2024-06-08 RX ADMIN — FENTANYL CITRATE 100 MCG: 50 INJECTION INTRAMUSCULAR; INTRAVENOUS at 09:31

## 2024-06-08 RX ADMIN — ROCURONIUM BROMIDE 20 MG: 10 INJECTION, SOLUTION INTRAVENOUS at 11:50

## 2024-06-08 RX ADMIN — METOCLOPRAMIDE HYDROCHLORIDE 10 MG: 5 INJECTION INTRAMUSCULAR; INTRAVENOUS at 09:31

## 2024-06-08 RX ADMIN — HEPARIN SODIUM 5000 UNITS: 5000 INJECTION INTRAVENOUS; SUBCUTANEOUS at 15:52

## 2024-06-08 RX ADMIN — SODIUM CHLORIDE: 9 INJECTION, SOLUTION INTRAVENOUS at 09:50

## 2024-06-08 RX ADMIN — Medication 1 L/MIN: at 20:00

## 2024-06-08 RX ADMIN — PIPERACILLIN SODIUM AND TAZOBACTAM SODIUM 3.38 G: 3; .375 INJECTION, SOLUTION INTRAVENOUS at 15:54

## 2024-06-08 SDOH — SOCIAL STABILITY: SOCIAL INSECURITY: ARE THERE ANY APPARENT SIGNS OF INJURIES/BEHAVIORS THAT COULD BE RELATED TO ABUSE/NEGLECT?: NO

## 2024-06-08 SDOH — HEALTH STABILITY: MENTAL HEALTH: CURRENT SMOKER: 0

## 2024-06-08 SDOH — SOCIAL STABILITY: SOCIAL INSECURITY: HAVE YOU HAD ANY THOUGHTS OF HARMING ANYONE ELSE?: NO

## 2024-06-08 SDOH — SOCIAL STABILITY: SOCIAL INSECURITY: ARE YOU OR HAVE YOU BEEN THREATENED OR ABUSED PHYSICALLY, EMOTIONALLY, OR SEXUALLY BY ANYONE?: NO

## 2024-06-08 SDOH — SOCIAL STABILITY: SOCIAL INSECURITY: HAS ANYONE EVER THREATENED TO HURT YOUR FAMILY OR YOUR PETS?: NO

## 2024-06-08 SDOH — SOCIAL STABILITY: SOCIAL INSECURITY: WERE YOU ABLE TO COMPLETE ALL THE BEHAVIORAL HEALTH SCREENINGS?: YES

## 2024-06-08 SDOH — SOCIAL STABILITY: SOCIAL INSECURITY: ABUSE: ADULT

## 2024-06-08 SDOH — SOCIAL STABILITY: SOCIAL INSECURITY: DOES ANYONE TRY TO KEEP YOU FROM HAVING/CONTACTING OTHER FRIENDS OR DOING THINGS OUTSIDE YOUR HOME?: NO

## 2024-06-08 SDOH — SOCIAL STABILITY: SOCIAL INSECURITY: HAVE YOU HAD THOUGHTS OF HARMING ANYONE ELSE?: NO

## 2024-06-08 SDOH — SOCIAL STABILITY: SOCIAL INSECURITY: DO YOU FEEL ANYONE HAS EXPLOITED OR TAKEN ADVANTAGE OF YOU FINANCIALLY OR OF YOUR PERSONAL PROPERTY?: NO

## 2024-06-08 SDOH — SOCIAL STABILITY: SOCIAL INSECURITY: DO YOU FEEL UNSAFE GOING BACK TO THE PLACE WHERE YOU ARE LIVING?: NO

## 2024-06-08 ASSESSMENT — PAIN DESCRIPTION - LOCATION
LOCATION: ABDOMEN

## 2024-06-08 ASSESSMENT — COGNITIVE AND FUNCTIONAL STATUS - GENERAL
TOILETING: A LITTLE
EATING MEALS: A LITTLE
DAILY ACTIVITIY SCORE: 24
MOBILITY SCORE: 24
PERSONAL GROOMING: A LITTLE
DRESSING REGULAR UPPER BODY CLOTHING: A LITTLE
MOBILITY SCORE: 24
DAILY ACTIVITIY SCORE: 18
CLIMB 3 TO 5 STEPS WITH RAILING: TOTAL
DAILY ACTIVITIY SCORE: 24
TURNING FROM BACK TO SIDE WHILE IN FLAT BAD: A LITTLE
MOVING TO AND FROM BED TO CHAIR: A LITTLE
MOBILITY SCORE: 15
DRESSING REGULAR LOWER BODY CLOTHING: A LITTLE
STANDING UP FROM CHAIR USING ARMS: A LOT
HELP NEEDED FOR BATHING: A LITTLE
PATIENT BASELINE BEDBOUND: NO
WALKING IN HOSPITAL ROOM: A LOT

## 2024-06-08 ASSESSMENT — ACTIVITIES OF DAILY LIVING (ADL)
FEEDING YOURSELF: INDEPENDENT
HEARING - LEFT EAR: FUNCTIONAL
WALKS IN HOME: INDEPENDENT
LACK_OF_TRANSPORTATION: NO
BATHING: INDEPENDENT
HEARING - RIGHT EAR: FUNCTIONAL
PATIENT'S MEMORY ADEQUATE TO SAFELY COMPLETE DAILY ACTIVITIES?: YES
GROOMING: INDEPENDENT
ADEQUATE_TO_COMPLETE_ADL: YES
JUDGMENT_ADEQUATE_SAFELY_COMPLETE_DAILY_ACTIVITIES: YES
TOILETING: INDEPENDENT
DRESSING YOURSELF: INDEPENDENT

## 2024-06-08 ASSESSMENT — PAIN - FUNCTIONAL ASSESSMENT

## 2024-06-08 ASSESSMENT — PAIN SCALES - GENERAL
PAINLEVEL_OUTOF10: 4
PAINLEVEL_OUTOF10: 10 - WORST POSSIBLE PAIN
PAINLEVEL_OUTOF10: 0 - NO PAIN
PAINLEVEL_OUTOF10: 4
PAINLEVEL_OUTOF10: 4
PAIN_LEVEL: 4
PAINLEVEL_OUTOF10: 7
PAINLEVEL_OUTOF10: 8
PAINLEVEL_OUTOF10: 10 - WORST POSSIBLE PAIN
PAINLEVEL_OUTOF10: 2
PAINLEVEL_OUTOF10: 0 - NO PAIN
PAINLEVEL_OUTOF10: 4
PAINLEVEL_OUTOF10: 10 - WORST POSSIBLE PAIN
PAINLEVEL_OUTOF10: 7
PAINLEVEL_OUTOF10: 10 - WORST POSSIBLE PAIN
PAINLEVEL_OUTOF10: 3
PAINLEVEL_OUTOF10: 4
PAINLEVEL_OUTOF10: 4
PAINLEVEL_OUTOF10: 2

## 2024-06-08 ASSESSMENT — PATIENT HEALTH QUESTIONNAIRE - PHQ9
1. LITTLE INTEREST OR PLEASURE IN DOING THINGS: NOT AT ALL
SUM OF ALL RESPONSES TO PHQ9 QUESTIONS 1 & 2: 0
2. FEELING DOWN, DEPRESSED OR HOPELESS: NOT AT ALL

## 2024-06-08 ASSESSMENT — LIFESTYLE VARIABLES
HOW OFTEN DO YOU HAVE A DRINK CONTAINING ALCOHOL: MONTHLY OR LESS
SUBSTANCE_ABUSE_PAST_12_MONTHS: NO
AUDIT-C TOTAL SCORE: 1
HOW OFTEN DO YOU HAVE 6 OR MORE DRINKS ON ONE OCCASION: NEVER
SKIP TO QUESTIONS 9-10: 1
PRESCIPTION_ABUSE_PAST_12_MONTHS: NO
HOW MANY STANDARD DRINKS CONTAINING ALCOHOL DO YOU HAVE ON A TYPICAL DAY: 1 OR 2
AUDIT-C TOTAL SCORE: 1

## 2024-06-08 ASSESSMENT — PAIN DESCRIPTION - DESCRIPTORS: DESCRIPTORS: ACHING;DULL

## 2024-06-08 ASSESSMENT — PAIN DESCRIPTION - ORIENTATION: ORIENTATION: MID

## 2024-06-08 NOTE — PROGRESS NOTES
Kaveh Adkins is a 69 y.o. male on day 0 of admission presenting with Generalized abdominal pain.      Subjective   69-year-old male who apparently was awakened from sleep morning of 6/7 with right lower quadrant pain.  He had nausea but no vomiting.  This was sufficient that he sought medical care.  Upon presentation to the ED CT of the abdomen suggested acute appendicitis.  He was taken to the OR today and underwent appendectomy.  Apparently was a difficult procedure initially attempted laparoscopic but due to significant adhesions resulted in open procedure with about 3 hours of meticulous adhesion dissection.  Report of nonruptured appendix.  He is seen in the ICU postoperatively today and is awake alert and interactive appropriately.  His biggest complaint is persistent phlegm in his throat and difficulty expectorating due to abdominal discomfort trying to cough it up.  Expectorating clear mucus.  Blood pressure was 213/99 on presentation.  He does have history of hypertension and describes being on metoprolol and spironolactone as outpatient.  Here we are using metoprolol and hydralazine IV.  We are asked to see the patient for medical management.    PMHx includes SBO HTN DM2 CKD skin cancer  FMHx includes thyroid cancer and daughter  Social history reveals that he quit smoking cigarettes 2015 but last fall in October he once again began smoking 3 small cigars per day.  EtOH is occasional.  Denies illegal drug use         Objective     Last Recorded Vitals  /80   Pulse 81   Temp 36.9 °C (98.4 °F) (Temporal)   Resp 14   Wt 121 kg (266 lb 15.6 oz)   SpO2 98%   Intake/Output last 3 Shifts:    Intake/Output Summary (Last 24 hours) at 6/8/2024 1844  Last data filed at 6/8/2024 1800  Gross per 24 hour   Intake 1913.33 ml   Output 1120 ml   Net 793.33 ml       Admission Weight  Weight: 122 kg (270 lb) (06/07/24 1906)    Daily Weight  06/08/24 : 121 kg (266 lb 15.6 oz)    Image Results  XR abdomen 1  view  Narrative: Interpreted By:  Mohit Brady,   STUDY:  XR ABDOMEN 1 VIEW; 6/8/2024 2:37 pm      INDICATION:  Signs/Symptoms:NGT placement.      COMPARISON:  None.      ACCESSION NUMBER(S):  [id number]      ORDERING CLINICIAN:  RYLIE BAUTISTA      TECHNIQUE:  Upright portable KUB      FINDINGS:  Examination is limited due to the portable technique, patient body  habitus, and upright positioning of the patient. Nonetheless the  nasogastric tube extends into the epigastrium with the tip likely  lying within the body of the stomach. Left upper quadrant right upper  quadrant surgical clips are present. Bowel-gas pattern assessment is  limited by the technique. Nonetheless distended small bowel loops are  suspected within the midabdomen. Air and fecal material is suspected  to be present within the ascending colon and transverse colon. Pelvis  is omitted from the exam. Intraperitoneal free air is not identified  but sensitivity is likely diminished due to the technique.      Impression: Nasogastric tube extends into the epigastrium.  Small bowel distention is present.      MACRO:  none      Signed by: Mohit Brady 6/8/2024 4:17 PM  Dictation workstation:   [id number]  XR chest 1 view  Narrative: Interpreted By:  Mohit Brady,   STUDY:  XR CHEST 1 VIEW; 6/8/2024 2:37 pm      INDICATION:  CLINICAL INFORMATION: Signs/Symptoms:NGT.      COMPARISON:  Abdominal CT 06/27/2024      ACCESSION NUMBER(S):  [id number]      ORDERING CLINICIAN:  RYLIE BAUTISTA      TECHNIQUE:  Portable chest one view.      FINDINGS:  The cardiac size is indeterminate in view of the AP projection.  Nasogastric tube overlies the mediastinum extending into the upper  abdomen. No infiltrates or effusions are identified.  Left basilar  density is thought to be secondary to an epicardial fat pad based on  CT study from 06/07/2024.      Impression: No acute pathologic findings are identified.  Nasogastric tube extends into the  upper abdomen.      MACRO:  none      Signed by: Mohit Brady 6/8/2024 4:14 PM  Dictation workstation:   [id number]      Physical Exam  Constitutional:       Appearance: He is not ill-appearing, toxic-appearing or diaphoretic.      Comments: Obese  male awake alert and interactive appropriately and mild distress from postoperative abdominal incisional pain   HENT:      Head: Normocephalic and atraumatic.      Nose: Nose normal.      Comments: NG tube in place     Mouth/Throat:      Mouth: Mucous membranes are moist.      Pharynx: Oropharynx is clear. No posterior oropharyngeal erythema.   Eyes:      General: No scleral icterus.     Extraocular Movements: Extraocular movements intact.      Conjunctiva/sclera: Conjunctivae normal.      Pupils: Pupils are equal, round, and reactive to light.   Cardiovascular:      Rate and Rhythm: Normal rate and regular rhythm.      Pulses: Normal pulses.      Heart sounds: No murmur heard.  Pulmonary:      Effort: Pulmonary effort is normal. No respiratory distress.      Breath sounds: Normal breath sounds. No wheezing, rhonchi or rales.   Abdominal:      Comments: Postoperative dressing in place.  Appropriately tender to palpation.   Musculoskeletal:         General: No swelling or deformity. Normal range of motion.      Cervical back: Normal range of motion and neck supple.      Right lower leg: No edema.      Left lower leg: No edema.   Skin:     General: Skin is warm and dry.      Capillary Refill: Capillary refill takes less than 2 seconds.      Coloration: Skin is not jaundiced.      Findings: No rash.   Neurological:      General: No focal deficit present.      Mental Status: He is oriented to person, place, and time.   Psychiatric:         Mood and Affect: Mood normal.         Behavior: Behavior normal.         Thought Content: Thought content normal.         Judgment: Judgment normal.         Relevant Results               Assessment/Plan   This patient currently  has cardiac telemetry ordered; if you would like to modify or discontinue the telemetry order, click here to go to the orders activity to modify/discontinue the order.              Principal Problem:    Generalized abdominal pain  Active Problems:    Abdominal pain, generalized    Primary hypertension    Hiatal hernia    NALLELY (obstructive sleep apnea)    Type 2 diabetes mellitus with hyperglycemia (Multi)    Acute appendicitis with localized peritonitis, without perforation or abscess    1.  Acute appendicitis  -Operative day for open appendectomy complicated by extensive adhesions.  Appendix was found nonruptured.  Continues on Zosyn.    2.  HTN  -Significantly elevated on presentation likely related to acute appendicitis pain.  -Remains moderately elevated postoperatively and as n.p.o. he is on IV metoprolol and IV hydralazine.  Normally takes metoprolol and spironolactone he describes.    3.  DM 2  -Apparently was at 1 time on Ozempic but it became too expensive.  He states now he is on glipizide and feels he is under good control.  Check hemoglobin A1c.  SSI/Accu-Cheks    4.  CKD  -Does not follow with nephrologist.  Last available creatinine in EMR 1.87 from August 2022.  -Continue to follow but appears likely roughly at baseline.    5.  DVT prophylaxis  -SCDs, otherwise per primary service.                Cortez Porter MD

## 2024-06-08 NOTE — ANESTHESIA PREPROCEDURE EVALUATION
Patient: Kaveh Adkins    Procedure Information       Date/Time: 06/08/24 0900    Procedure: Laparoscopic, possible open, appendectomy    Location: [facility] 01 / Virtual [facility]    Surgeons: Misha Stafford MD            Relevant Problems   Cardiac   (+) Primary hypertension      Pulmonary   (+) NALLELY (obstructive sleep apnea)      GI   (+) Hiatal hernia      Endocrine   (+) Type 2 diabetes mellitus with hyperglycemia (Multi)       Clinical information reviewed:    Allergies                NPO Detail:  No data recorded     Physical Exam    Airway  Mallampati: II  TM distance: <3 FB  Neck ROM: limited     Cardiovascular   Rhythm: regular  Rate: normal     Dental    Pulmonary   Breath sounds clear to auscultation     Abdominal   (+) obese  Bowel sounds: normal       Anesthesia Plan    History of general anesthesia?: yes  History of complications of general anesthesia?: no    ASA 3 - emergent     general     The patient is not a current smoker.  Patient was previously instructed to abstain from smoking on day of procedure.  Patient did not smoke on day of procedure.  Education provided regarding risk of obstructive sleep apnea.  intravenous induction   Postoperative administration of opioids is intended.  Anesthetic plan and risks discussed with patient.  Use of blood products discussed with patient who consented to blood products.    Plan discussed with CRNA.

## 2024-06-08 NOTE — BRIEF OP NOTE
Date: 2024 - 2024  OR Location: [facility]    Name: Kaveh Adkins : 1955, Age: 69 y.o., MRN: [id number], Sex: male    Diagnosis  Pre-op Diagnosis     * Acute appendicitis with localized peritonitis, without perforation or abscess, unspecified whether gangrene present [K35.30] Post-op Diagnosis     * Acute appendicitis with localized peritonitis, without perforation or abscess, unspecified whether gangrene present [K35.30]     Procedures  Open Appendectomy with Lysis of Adhesions  [address]168 - [place] LAPAROSCOPIC APPENDECTOMY  Laparoscopic converted to open appendectomy  Extensive lysis of adhesions    Surgeons      * Misha Stafford - Primary    Resident/Fellow/Other Assistant:  Julian Loomis DO    Procedure Summary  Anesthesia: General  ASA: III  Anesthesia Staff: CRNA: YIN Resendiz-CRNA  Estimated Blood Loss: 50mL  Intra-op Medications: * Intraprocedure medication information is unavailable because the case start and end events have not been set *           Anesthesia Record               Intraprocedure I/O Totals          Intake    Propofol Drip 0.00 mL    The total shown is the total volume documented since Anesthesia Start was filed.    Autologous Blood 0 mL    Cell Saver 0 mL    Total Intake 0 mL       Output    Urine 300 mL    NG/OG Tube Output 50 mL    Other 0 mL    Total Output 350 mL       Net    Net Volume -350 mL          Specimen:   ID Type Source Tests Collected by Time   1 : Appendix Tissue APPENDIX SURGICAL PATHOLOGY EXAM Misha Stafford MD 2024 1225        Staff:   Circulator: Greg Liu Person: Janny          Findings:   Extensive adhesions within abdominal cavity; unable to perform laparoscopic removal of appendix  Nonperforated appendicitis    Complications:  None; patient tolerated the procedure well.     Disposition: PACU - hemodynamically stable.  Condition: stable  Specimens Collected:   ID Type Source Tests Collected by Time   1 : Appendix Tissue APPENDIX SURGICAL  PATHOLOGY EXAM Misha Stafford MD 6/8/2024 1225     Attending Attestation: I was present and scrubbed for the entire procedure.    Misha Stafford  Phone Number: [phone]

## 2024-06-08 NOTE — ANESTHESIA PROCEDURE NOTES
Airway  Date/Time: 6/8/2024 9:36 AM  Urgency: emergent      Staffing  Performed: CRNA   Authorized by: KERON Resendiz    Performed by: KERON Resendiz  Patient location during procedure: OR    Indications and Patient Condition  Indications for airway management: anesthesia  Spontaneous ventilation: present  Sedation level: deep  Preoxygenated: yes  Patient position: ramp  MILS maintained throughout  Mask difficulty assessment: 1 - vent by mask  No planned trial extubation    Final Airway Details  Final airway type: endotracheal airway      Successful airway: ETT  Cuffed: yes   Successful intubation technique: direct laryngoscopy  Endotracheal tube insertion site: oral  Blade: Oliverio  Blade size: #4  ETT size (mm): 8.0  Cormack-Lehane Classification: grade IIa - partial view of glottis  Placement verified by: chest auscultation   Cuff volume (mL): 8  Measured from: gums  Number of attempts at approach: 1  Ventilation between attempts: none  Number of other approaches attempted: 0

## 2024-06-08 NOTE — PROGRESS NOTES
Emergency Medicine Transition of Care Note.    I received Kaveh Adkins in signout from Dr. De Leon.  Please see the previous ED provider note for all HPI, PE and MDM up to the time of signout. This is in addition to the primary record.    In brief Kaveh Adkins is an 69 y.o. male presenting for   Chief Complaint   Patient presents with    Abdominal Pain     Constipation, nausea    Hypertension    Shortness of Breath     All sx started today,      At the time of signout we were awaiting: CT results, labs    ED Course as of 06/12/24 2018 Fri Jun 07, 2024 2119 CT concerning for acute appendicitis.  The patient does have a slight leukocytosis to 13.  He is hypertensive but otherwise hemodynamically normal.  He was given a dose of Zosyn.  I contacted on-call surgeon, Dr. Stafford who will come evaluate the patient. [SP]      ED Course User Index  [SP] Megan Medina DO         Diagnoses as of 06/12/24 2018   Abdominal pain, generalized   Acute appendicitis with localized peritonitis, without perforation or abscess, unspecified whether gangrene present       Medical Decision Making  Labs with mild leukocytosis to 13 and otherwise normal CBC, BMP, hepatic function panel, lipase.  CT scan does show evidence of acute appendicitis.  The patient's symptoms are controlled with morphine and Zofran.  He was given an additional dose of Zosyn and surgery was contacted.    Dr. Stafford, surgeon, evaluated the patient and will admit for further care.  He does request the patient's home Lopressor tablet be given as the patient is significantly hypertensive.  This was ordered.    Final diagnoses:   [R10.84] Abdominal pain, generalized           Procedure  Procedures    Megan Medina DO

## 2024-06-08 NOTE — OP NOTE
Open Appendectomy with Lysis of Adhesions Operative Note     Date: 2024  OR Location: [facility]    Name: Kaveh Adkins, : 1955, Age: 69 y.o., MRN: [id number], Sex: male    Diagnosis  Pre-op Diagnosis     * Acute appendicitis with localized peritonitis, without perforation or abscess, unspecified whether gangrene present [K35.30] Post-op Diagnosis     * Acute appendicitis with localized peritonitis, without perforation or abscess, unspecified whether gangrene present [K35.30]     Procedures  Laparoscopic converted to open appendectomy  Extensive enterolysis      Surgeons      * Misha Stafford - Primary    Resident/Fellow/Other Assistant:  Julian Loomis DO    Procedure Summary  Anesthesia: General  ASA: III  Anesthesia Staff: CRNA: YIN Resendiz-CRNA  Estimated Blood Loss: 50mL  Intra-op Medications: * Intraprocedure medication information is unavailable because the case start and end events have not been set *           Anesthesia Record               Intraprocedure I/O Totals          Intake    Propofol Drip 0.00 mL    The total shown is the total volume documented since Anesthesia Start was filed.    Autologous Blood 0 mL    Cell Saver 0 mL    Total Intake 0 mL       Output    Urine 300 mL    NG/OG Tube Output 50 mL    Other 0 mL    Total Output 350 mL       Net    Net Volume -350 mL          Specimen:   ID Type Source Tests Collected by Time   1 : Appendix Tissue APPENDIX SURGICAL PATHOLOGY EXAM Misha Stafford MD 2024 1225        Staff:   Circulator: Greg Liu Person: Janny         Drains and/or Catheters:   [REMOVED] Urethral Catheter Non-latex 16 Fr. (Removed)   Output (mL) 400 mL 24 1400       Findings:   Extensive adhesions preventing laparoscopic procedure  Performed extensive and meticulous lysis of adhesions up to 3 hours in order to access RLQ to appendix  Nonperforated acute appendicitis    Indications: Kaveh Adkins is an 69 y.o. male who is having surgery for  Acute appendicitis with localized peritonitis, without perforation or abscess, unspecified whether gangrene present [K35.30].     The patient was seen in the preoperative area. The risks, benefits, complications, treatment options, non-operative alternatives, expected recovery and outcomes were discussed with the patient. The possibilities of reaction to medication, pulmonary aspiration, injury to surrounding structures, bleeding, recurrent infection, the need for additional procedures, failure to diagnose a condition, and creating a complication requiring transfusion or operation were discussed with the patient. The patient concurred with the proposed plan, giving informed consent.  The site of surgery was properly noted/marked if necessary per policy. The patient has been actively warmed in preoperative area. Preoperative antibiotics have been ordered and given within 1 hours of incision. Venous thrombosis prophylaxis have been ordered including bilateral sequential compression devices    Procedure Details:     The patient was brought to the operating room and transferred onto the operating table.  Sequential compression devices were placed.  An informal huddle was performed.  General endotracheal anaesthesia was induced by the anaesthesia team.  IV antibiotics were infused.  The left arm was tucked next to the patient's torso.  A menendez catheter was placed under sterile conditions.  The abdomen was prepped and draped in the usual sterile fashion.  A formal timeout was performed verifying the correct patient and procedure to be performed.  Local anaesthetic agent was injected in the supraumbilical region.  A vertical incision was made in the supraumbilical area.  The incision was deepened down to the fascia and this was grasped with Kocher clamps.  A vertical incision was made through the fascia. We had initially thought that we were in the abdominal cavity with a finger sweep manouevre and visual confirmation but  "it appeared to be preperitoneal.  We had difficulty in attempting to gain access to the cavity and decided to convert to an open procedure.    The incision was extended down to the pelvis.  We were finally able to gain entry into the abdominal cavity but encountered what appeared to be a \"frozen abdomen\".  We then performed a 3 hour extensive and meticulous lysis of adhesions which finally allowed us to access down to the right lower quadrant by following the taenia coli of the right colon.  We freed up the caecum and proximal right colon at the [place] of [place] which then allowed us to visualise the base of the caecum and appendix.  The tip was curled medially and was not perforated.  The entire appendix was then freed up from the side wall and a window was created between the appendix base and mesoappendix.  The appendix was then transected using a vascular stapling load, and using the base of the appendix as a lead point, we were able to free up the rest of the appendix using a second vascular stapling load.  A third load was required to transect the mesoappendix from the peritoneal attachment.  The specimen was passed off as a non-perforated acute appendicitis specimen.  The staple stumps were reinforced with #3-0 silk sutures in a Lembert fashion.  We then irrigated the cavity with warm saline and suctioned off the irrigant.  Archie was placed at the stapled stumps to help with the coagulative process.  The fascia was then brought together with an #0 looped PDS suture.  The skin was approximated with skin staples and a sterile dressing was placed over it.  The menendez catheter was removed at the end of the case.  All needles, instruments, and sponges were correct to the count.  The patient was successfully extubated and taken to the PACU in stable condition.  I was present for the entire duration of the case.        Complications:  None; patient tolerated the procedure well.    Disposition: PACU - " hemodynamically stable.  Condition: stable       Attending Attestation: I was present and scrubbed for the entire procedure.    Misha Stafford  Phone Number: [phone]

## 2024-06-08 NOTE — CARE PLAN
Problem: Pain  Goal: My pain/discomfort is manageable  Outcome: Progressing     Problem: Safety  Goal: I will remain free of falls  Outcome: Progressing   The patient's goals for the shift include      The clinical goals for the shift include vital signs, safety    Over the shift, the patient did make progress toward the above listed goals.

## 2024-06-08 NOTE — ANESTHESIA POSTPROCEDURE EVALUATION
Patient: Kaveh Adkins    Procedure Summary       Date: 06/08/24 Room / Location: [facility] 01 / [facility]    Anesthesia Start: 0904 Anesthesia Stop: 1413    Procedure: Open Appendectomy with Lysis of Adhesions Diagnosis:       Acute appendicitis with localized peritonitis, without perforation or abscess, unspecified whether gangrene present      (Acute appendicitis with localized peritonitis, without perforation or abscess, unspecified whether gangrene present [K35.30])    Surgeons: Misha Stafford MD Responsible Provider: KERON Resendiz    Anesthesia Type: general ASA Status: 3 - Emergent            Anesthesia Type: general    Vitals Value Taken Time   /98 06/08/24 1410   Temp 97.9 06/08/24 1414   Pulse 69 06/08/24 1414   Resp 15 06/08/24 1414   SpO2 99 % 06/08/24 1414   Vitals shown include unfiled device data.    Anesthesia Post Evaluation    Patient location during evaluation: PACU  Patient participation: complete - patient participated  Level of consciousness: awake and alert  Pain score: 4  Pain management: adequate  Multimodal analgesia pain management approach  Airway patency: patent  Two or more strategies used to mitigate risk of obstructive sleep apnea  Cardiovascular status: acceptable and hemodynamically stable  Respiratory status: acceptable and face mask  Hydration status: acceptable  Postoperative Nausea and Vomiting: none    No notable events documented.

## 2024-06-08 NOTE — H&P
History Of Present Illness  Kaveh Adkins is a 69 y.o. male presenting with acute onset RLQ abdominal pain which woke him up from sleep this morning.  Pain has been gradually getting worse.  He reports some nausea, denies emesis, denies fevers, reports subjective chills, but is still passing gas and had a bowel movement earlier this evening.  CT A/P performed in the ED shows signs of acute appendicitis.  Surgical history is noted for an open hiatal hernia repair, open cholecystectomy, and ex lap with SBR for an SBO.       Past Medical History  Past Medical History:   Diagnosis Date    Aortic aneurysm of unspecified site, without rupture (CMS-HCC)     Aneurysm of descending aorta    Essential (primary) hypertension 12/04/2013    Benign essential hypertension    Personal history of malignant neoplasm, unspecified     History of malignant neoplasm    Personal history of other diseases of the circulatory system     History of hypertension    Personal history of other endocrine, nutritional and metabolic disease     History of high cholesterol    Personal history of other specified conditions 06/18/2018    History of gross hematuria    Type 2 diabetes mellitus without complications (Multi) 12/04/2013    Diabetes mellitus       Surgical History  Past Surgical History:   Procedure Laterality Date    HERNIA REPAIR  06/18/2018    Hernia Repair    OTHER SURGICAL HISTORY  10/04/2021    Gallbladder surgery    OTHER SURGICAL HISTORY  10/04/2021    Abdominal surgery    OTHER SURGICAL HISTORY  10/21/2022    Small bowel resection        Social History  He has no history on file for tobacco use, alcohol use, and drug use.    Family History  No family history on file.     Allergies  Cephalosporins, Ciprofloxacin, Cefuroxime axetil, Doxycycline, Furosemide, Hydrochlorothiazide, and Metformin hcl    Review of Systems  Constitutional: Denies changes in weight, fatigue, night-sweats  HEENT: No changes in vision, nasal drainage,  headache  CV: No palpitations, no chest pain, no lower extremity oedema  Resp: No wheezing, no cough, no shortness of breath  GI: +nausea, no vomiting, diarrhoea, constipation  : No dysuria, no increased frequency  Neuro: No weakness, confusion, numbness, dizziness  MSK: No weakness, arthralgias, myalgias  Haem: No easy bruising, no easy bleeding  Skin: No new lesions or rashes  Endocrine: No polydipsia, polyuria, heat/cold insensitivity    BP (!) 220/110   Pulse 71   Temp 36.4 °C (97.5 °F)   Resp 16   Ht 1.829 m (6')   Wt 122 kg (270 lb)   SpO2 96%   BMI 36.62 kg/m²   Physical Exam  Vitals reviewed.   Pulmonary:      Effort: Pulmonary effort is normal.   Abdominal:      Palpations: Abdomen is soft.      Comments: Obese, distended, TTP RLQ with voluntary guarding  Midline scar   Skin:     General: Skin is warm.      Capillary Refill: Capillary refill takes less than 2 seconds.   Neurological:      General: No focal deficit present.      Mental Status: He is alert.   Psychiatric:         Mood and Affect: Mood normal.       Results for orders placed or performed during the hospital encounter of 06/07/24 (from the past 24 hour(s))   Lipase   Result Value Ref Range    Lipase 34 9 - 82 U/L   Hepatic Function Panel   Result Value Ref Range    Albumin 4.3 3.4 - 5.0 g/dL    Bilirubin, Total 1.1 0.0 - 1.2 mg/dL    Bilirubin, Direct 0.2 0.0 - 0.3 mg/dL    Alkaline Phosphatase 114 33 - 136 U/L    ALT 12 10 - 52 U/L    AST 13 9 - 39 U/L    Total Protein 7.4 6.4 - 8.2 g/dL   Basic Metabolic Panel   Result Value Ref Range    Glucose 105 (H) 74 - 99 mg/dL    Sodium 138 136 - 145 mmol/L    Potassium 4.3 3.5 - 5.3 mmol/L    Chloride 105 98 - 107 mmol/L    Bicarbonate 26 21 - 32 mmol/L    Anion Gap 11 10 - 20 mmol/L    Urea Nitrogen 14 6 - 23 mg/dL    Creatinine 1.20 0.50 - 1.30 mg/dL    eGFR 66 >60 mL/min/1.73m*2    Calcium 9.5 8.6 - 10.3 mg/dL   CBC and Auto Differential   Result Value Ref Range    WBC 13.3 (H) 4.4 - 11.3  x10*3/uL    nRBC 0.0 0.0 - 0.0 /100 WBCs    RBC 5.61 4.50 - 5.90 x10*6/uL    Hemoglobin 17.3 13.5 - 17.5 g/dL    Hematocrit 50.9 41.0 - 52.0 %    MCV 91 80 - 100 fL    MCH 30.8 26.0 - 34.0 pg    MCHC 34.0 32.0 - 36.0 g/dL    RDW 13.3 11.5 - 14.5 %    Platelets 114 (L) 150 - 450 x10*3/uL    Neutrophils % 84.1 40.0 - 80.0 %    Immature Granulocytes %, Automated 0.2 0.0 - 0.9 %    Lymphocytes % 9.6 13.0 - 44.0 %    Monocytes % 5.2 2.0 - 10.0 %    Eosinophils % 0.7 0.0 - 6.0 %    Basophils % 0.2 0.0 - 2.0 %    Neutrophils Absolute 11.16 (H) 1.20 - 7.70 x10*3/uL    Immature Granulocytes Absolute, Automated 0.03 0.00 - 0.70 x10*3/uL    Lymphocytes Absolute 1.28 1.20 - 4.80 x10*3/uL    Monocytes Absolute 0.69 0.10 - 1.00 x10*3/uL    Eosinophils Absolute 0.09 0.00 - 0.70 x10*3/uL    Basophils Absolute 0.03 0.00 - 0.10 x10*3/uL     CT abdomen pelvis w IV contrast 06/07/2024    Narrative  Interpreted By:  Saad Lange,  STUDY:  CT ABDOMEN PELVIS W IV CONTRAST;  6/7/2024 8:32 pm    INDICATION:  Signs/Symptoms:abdominal pain.    COMPARISON:  CT abdomen and pelvis 08/25/2022    ACCESSION NUMBER(S):  [id number]    ORDERING CLINICIAN:  EARL ALSTON    TECHNIQUE:  CT of the abdomen and pelvis was performed.  Standard contiguous  axial images were obtained at 3 mm slice thickness through the  abdomen and pelvis. Coronal and sagittal reconstructions at 3 mm  slice thickness were performed.    100 ml of contrast Omnipaque 350 were administered intravenously  without immediate complication.    FINDINGS:  LOWER CHEST:  Bibasilar atelectasis.    ABDOMEN:    LIVER:  The liver is normal in size without evidence of focal liver lesions.    BILE DUCTS:  The intrahepatic and extrahepatic ducts are not dilated.    GALLBLADDER:  The gallbladder is surgically absent.    PANCREAS:  The pancreas appears unremarkable without evidence of ductal  dilatation or masses.    SPLEEN:  The spleen is normal in size without focal lesions.    ADRENAL  GLANDS:  Bilateral adrenal glands appear normal.    KIDNEYS AND URETERS:  Interval resection of previously noted renal mass with cortical  scarring and fat necrosis within the region of the previously noted  mass. Additionally, there is a 3.1 x 1.7 cm soft tissue attenuation  lesion within the partial nephrectomy bed, possibly sequela of prior  treatment changes. There is mild surrounding soft tissue stranding.  Multiple left simple cysts the largest measures 8.9 cm. No  hydroureteronephrosis or nephroureterolithiasis is identified.    PELVIS:    BLADDER:  The urinary bladder appears normal without abnormal wall thickening.    REPRODUCTIVE ORGANS:  No pelvic masses.    BOWEL:  Small hiatal hernia. The stomach is otherwise unremarkable. No bowel  dilation or significant wall thickening. Moderate colonic stool.  Partial colectomy with anastomotic sutures in the left hemipelvis  appendicolith with distended appendix, measuring up to 16 mm, with  wall thickening hyperemia and surrounding soft tissue stranding  compatible with acute appendicitis.    VESSELS:  There is no aneurysmal dilatation of the abdominal aorta. The IVC  appears normal. Dense atherosclerotic calcifications.    PERITONEUM/RETROPERITONEUM/LYMPH NODES:  There is no free or loculated fluid collection, no free  intraperitoneal air. The retroperitoneum appears normal.  No  abdominopelvic lymphadenopathy is present.    BONES AND ABDOMINAL WALL:  No suspicious osseous lesions are identified. Degenerative discogenic  disease is noted in the lower thoracic and lumbar spine.  Small fat  containing inguinal hernias.    Impression  1.  Appendicolith with appendiceal dilation, surrounding soft tissue  stranding and wall thickening compatible with acute appendicitis. No  drainable fluid collection or pneumoperitoneum.  2. Interval resection previously noted right renal mass with sequela  of fat necrosis and a 3.1 x 1.7 cm soft tissue attenuation lesion  within the  nephrectomy bed. Although this lesion does not appear  aggressive or malignant, further evaluation with MRI of the kidney is  recommended.  3. Other stable findings as described above.      Signed by: Saad Lange 6/7/2024 9:02 PM  Dictation workstation:   [id number]         Assessment/Plan   Principal Problem:    Generalized abdominal pain  Active Problems:    Acute appendicitis with localized peritonitis, without perforation or abscess  69M with acute appendicitis and hypertensive urgency  - admit ICU, IV Abx/NPO/IVF  - consent for lap, poss open appendectomy   - IMS consult for BP management  - DVT proph, IS, SCDs    I spent 45 minutes in the professional and overall care of this patient.      Misha Stafford MD

## 2024-06-09 LAB
ANION GAP SERPL CALC-SCNC: 12 MMOL/L (ref 10–20)
BUN SERPL-MCNC: 13 MG/DL (ref 6–23)
CALCIUM SERPL-MCNC: 8.4 MG/DL (ref 8.6–10.3)
CHLORIDE SERPL-SCNC: 105 MMOL/L (ref 98–107)
CO2 SERPL-SCNC: 23 MMOL/L (ref 21–32)
CREAT SERPL-MCNC: 1.28 MG/DL (ref 0.5–1.3)
EGFRCR SERPLBLD CKD-EPI 2021: 61 ML/MIN/1.73M*2
ERYTHROCYTE [DISTWIDTH] IN BLOOD BY AUTOMATED COUNT: 13.6 % (ref 11.5–14.5)
GLUCOSE BLD MANUAL STRIP-MCNC: 114 MG/DL (ref 74–99)
GLUCOSE BLD MANUAL STRIP-MCNC: 157 MG/DL (ref 74–99)
GLUCOSE BLD MANUAL STRIP-MCNC: 161 MG/DL (ref 74–99)
GLUCOSE BLD MANUAL STRIP-MCNC: 176 MG/DL (ref 74–99)
GLUCOSE BLD MANUAL STRIP-MCNC: 180 MG/DL (ref 74–99)
GLUCOSE BLD MANUAL STRIP-MCNC: 186 MG/DL (ref 74–99)
GLUCOSE SERPL-MCNC: 147 MG/DL (ref 74–99)
HCT VFR BLD AUTO: 46.1 % (ref 41–52)
HGB BLD-MCNC: 15.5 G/DL (ref 13.5–17.5)
MAGNESIUM SERPL-MCNC: 2.07 MG/DL (ref 1.6–2.4)
MCH RBC QN AUTO: 30.3 PG (ref 26–34)
MCHC RBC AUTO-ENTMCNC: 33.6 G/DL (ref 32–36)
MCV RBC AUTO: 90 FL (ref 80–100)
NRBC BLD-RTO: 0 /100 WBCS (ref 0–0)
PLATELET # BLD AUTO: 101 X10*3/UL (ref 150–450)
POTASSIUM SERPL-SCNC: 4.3 MMOL/L (ref 3.5–5.3)
RBC # BLD AUTO: 5.12 X10*6/UL (ref 4.5–5.9)
SODIUM SERPL-SCNC: 136 MMOL/L (ref 136–145)
WBC # BLD AUTO: 9.8 X10*3/UL (ref 4.4–11.3)

## 2024-06-09 PROCEDURE — 1200000002 HC GENERAL ROOM WITH TELEMETRY DAILY

## 2024-06-09 PROCEDURE — 2500000001 HC RX 250 WO HCPCS SELF ADMINISTERED DRUGS (ALT 637 FOR MEDICARE OP)

## 2024-06-09 PROCEDURE — C9113 INJ PANTOPRAZOLE SODIUM, VIA: HCPCS | Performed by: SURGERY

## 2024-06-09 PROCEDURE — 85027 COMPLETE CBC AUTOMATED: CPT | Performed by: FAMILY MEDICINE

## 2024-06-09 PROCEDURE — 99231 SBSQ HOSP IP/OBS SF/LOW 25: CPT | Performed by: SURGERY

## 2024-06-09 PROCEDURE — 2500000004 HC RX 250 GENERAL PHARMACY W/ HCPCS (ALT 636 FOR OP/ED): Performed by: FAMILY MEDICINE

## 2024-06-09 PROCEDURE — 82947 ASSAY GLUCOSE BLOOD QUANT: CPT | Mod: 91

## 2024-06-09 PROCEDURE — 99232 SBSQ HOSP IP/OBS MODERATE 35: CPT | Performed by: HOSPITALIST

## 2024-06-09 PROCEDURE — 80051 ELECTROLYTE PANEL: CPT | Performed by: FAMILY MEDICINE

## 2024-06-09 PROCEDURE — 83735 ASSAY OF MAGNESIUM: CPT | Performed by: FAMILY MEDICINE

## 2024-06-09 PROCEDURE — 2500000002 HC RX 250 W HCPCS SELF ADMINISTERED DRUGS (ALT 637 FOR MEDICARE OP, ALT 636 FOR OP/ED): Performed by: HOSPITALIST

## 2024-06-09 PROCEDURE — 2500000004 HC RX 250 GENERAL PHARMACY W/ HCPCS (ALT 636 FOR OP/ED): Performed by: SURGERY

## 2024-06-09 PROCEDURE — 36415 COLL VENOUS BLD VENIPUNCTURE: CPT | Performed by: FAMILY MEDICINE

## 2024-06-09 PROCEDURE — 2500000005 HC RX 250 GENERAL PHARMACY W/O HCPCS: Performed by: SURGERY

## 2024-06-09 PROCEDURE — 2500000002 HC RX 250 W HCPCS SELF ADMINISTERED DRUGS (ALT 637 FOR MEDICARE OP, ALT 636 FOR OP/ED): Performed by: SURGERY

## 2024-06-09 PROCEDURE — 2500000002 HC RX 250 W HCPCS SELF ADMINISTERED DRUGS (ALT 637 FOR MEDICARE OP, ALT 636 FOR OP/ED)

## 2024-06-09 PROCEDURE — 2500000005 HC RX 250 GENERAL PHARMACY W/O HCPCS: Performed by: ANESTHESIOLOGY

## 2024-06-09 PROCEDURE — 2500000001 HC RX 250 WO HCPCS SELF ADMINISTERED DRUGS (ALT 637 FOR MEDICARE OP): Performed by: SURGERY

## 2024-06-09 RX ORDER — DOCUSATE SODIUM 100 MG/1
100 CAPSULE, LIQUID FILLED ORAL 2 TIMES DAILY
Status: DISCONTINUED | OUTPATIENT
Start: 2024-06-09 | End: 2024-06-13 | Stop reason: HOSPADM

## 2024-06-09 RX ORDER — LIDOCAINE 560 MG/1
2 PATCH PERCUTANEOUS; TOPICAL; TRANSDERMAL DAILY
Status: DISCONTINUED | OUTPATIENT
Start: 2024-06-09 | End: 2024-06-13 | Stop reason: HOSPADM

## 2024-06-09 RX ORDER — OXYCODONE HYDROCHLORIDE 5 MG/1
5 TABLET ORAL EVERY 4 HOURS PRN
Status: DISCONTINUED | OUTPATIENT
Start: 2024-06-09 | End: 2024-06-10

## 2024-06-09 RX ORDER — TAMSULOSIN HYDROCHLORIDE 0.4 MG/1
0.4 CAPSULE ORAL DAILY
Status: DISCONTINUED | OUTPATIENT
Start: 2024-06-09 | End: 2024-06-13 | Stop reason: HOSPADM

## 2024-06-09 RX ORDER — SPIRONOLACTONE 25 MG/1
25 TABLET ORAL DAILY
Status: DISCONTINUED | OUTPATIENT
Start: 2024-06-09 | End: 2024-06-13 | Stop reason: HOSPADM

## 2024-06-09 RX ORDER — INSULIN LISPRO 100 [IU]/ML
0-10 INJECTION, SOLUTION INTRAVENOUS; SUBCUTANEOUS
Status: DISCONTINUED | OUTPATIENT
Start: 2024-06-09 | End: 2024-06-10

## 2024-06-09 RX ORDER — ENOXAPARIN SODIUM 100 MG/ML
40 INJECTION SUBCUTANEOUS DAILY
Status: DISCONTINUED | OUTPATIENT
Start: 2024-06-09 | End: 2024-06-13 | Stop reason: HOSPADM

## 2024-06-09 RX ORDER — DEXTROSE 50 % IN WATER (D50W) INTRAVENOUS SYRINGE
12.5
Status: DISCONTINUED | OUTPATIENT
Start: 2024-06-09 | End: 2024-06-10

## 2024-06-09 RX ORDER — DEXTROSE 50 % IN WATER (D50W) INTRAVENOUS SYRINGE
25
Status: DISCONTINUED | OUTPATIENT
Start: 2024-06-09 | End: 2024-06-10

## 2024-06-09 RX ORDER — ZINC SULFATE 50(220)MG
50 CAPSULE ORAL DAILY
Status: DISCONTINUED | OUTPATIENT
Start: 2024-06-09 | End: 2024-06-13 | Stop reason: HOSPADM

## 2024-06-09 RX ORDER — ASCORBIC ACID 500 MG
500 TABLET ORAL DAILY
Status: DISCONTINUED | OUTPATIENT
Start: 2024-06-09 | End: 2024-06-13 | Stop reason: HOSPADM

## 2024-06-09 RX ORDER — MULTIVIT-MIN/IRON FUM/FOLIC AC 7.5 MG-4
1 TABLET ORAL DAILY
Status: DISCONTINUED | OUTPATIENT
Start: 2024-06-09 | End: 2024-06-13 | Stop reason: HOSPADM

## 2024-06-09 RX ORDER — ROSUVASTATIN CALCIUM 20 MG/1
20 TABLET, COATED ORAL NIGHTLY
Status: DISCONTINUED | OUTPATIENT
Start: 2024-06-09 | End: 2024-06-13 | Stop reason: HOSPADM

## 2024-06-09 RX ORDER — METOPROLOL TARTRATE 1 MG/ML
5 INJECTION, SOLUTION INTRAVENOUS EVERY 6 HOURS PRN
Status: DISCONTINUED | OUTPATIENT
Start: 2024-06-09 | End: 2024-06-09

## 2024-06-09 RX ORDER — OXYCODONE HYDROCHLORIDE 10 MG/1
10 TABLET ORAL EVERY 4 HOURS PRN
Status: DISCONTINUED | OUTPATIENT
Start: 2024-06-09 | End: 2024-06-10

## 2024-06-09 RX ORDER — ACETAMINOPHEN 325 MG/1
975 TABLET ORAL EVERY 8 HOURS
Status: DISCONTINUED | OUTPATIENT
Start: 2024-06-09 | End: 2024-06-10

## 2024-06-09 RX ORDER — CALCIUM CARBONATE 200(500)MG
500 TABLET,CHEWABLE ORAL 2 TIMES DAILY
Status: DISCONTINUED | OUTPATIENT
Start: 2024-06-09 | End: 2024-06-10

## 2024-06-09 RX ORDER — BISACODYL 10 MG/1
10 SUPPOSITORY RECTAL DAILY PRN
Status: DISCONTINUED | OUTPATIENT
Start: 2024-06-09 | End: 2024-06-13 | Stop reason: HOSPADM

## 2024-06-09 RX ORDER — POLYETHYLENE GLYCOL 3350 17 G/17G
17 POWDER, FOR SOLUTION ORAL DAILY
Status: DISCONTINUED | OUTPATIENT
Start: 2024-06-09 | End: 2024-06-13 | Stop reason: HOSPADM

## 2024-06-09 RX ORDER — METOPROLOL TARTRATE 50 MG/1
50 TABLET ORAL 2 TIMES DAILY
Status: DISCONTINUED | OUTPATIENT
Start: 2024-06-09 | End: 2024-06-12

## 2024-06-09 RX ORDER — HYDRALAZINE HYDROCHLORIDE 20 MG/ML
10 INJECTION INTRAMUSCULAR; INTRAVENOUS EVERY 6 HOURS PRN
Status: DISCONTINUED | OUTPATIENT
Start: 2024-06-09 | End: 2024-06-10

## 2024-06-09 RX ADMIN — HYDRALAZINE HYDROCHLORIDE 10 MG: 20 INJECTION INTRAMUSCULAR; INTRAVENOUS at 18:04

## 2024-06-09 RX ADMIN — METOPROLOL TARTRATE 10 MG: 5 INJECTION INTRAVENOUS at 04:17

## 2024-06-09 RX ADMIN — INSULIN LISPRO 2 UNITS: 100 INJECTION, SOLUTION INTRAVENOUS; SUBCUTANEOUS at 11:47

## 2024-06-09 RX ADMIN — HYDROMORPHONE HYDROCHLORIDE 0.6 MG: 1 INJECTION, SOLUTION INTRAMUSCULAR; INTRAVENOUS; SUBCUTANEOUS at 19:16

## 2024-06-09 RX ADMIN — Medication 21 PERCENT: at 07:51

## 2024-06-09 RX ADMIN — HYDROMORPHONE HYDROCHLORIDE 0.6 MG: 1 INJECTION, SOLUTION INTRAMUSCULAR; INTRAVENOUS; SUBCUTANEOUS at 07:31

## 2024-06-09 RX ADMIN — Medication 50 MG OF ELEMENTAL ZINC: at 11:47

## 2024-06-09 RX ADMIN — ANTACID TABLETS 500 MG: 500 TABLET, CHEWABLE ORAL at 20:38

## 2024-06-09 RX ADMIN — METOPROLOL TARTRATE 50 MG: 50 TABLET, FILM COATED ORAL at 20:38

## 2024-06-09 RX ADMIN — ACETAMINOPHEN 975 MG: 325 TABLET ORAL at 23:10

## 2024-06-09 RX ADMIN — DOCUSATE SODIUM 100 MG: 100 CAPSULE, LIQUID FILLED ORAL at 10:03

## 2024-06-09 RX ADMIN — INSULIN LISPRO 2 UNITS: 100 INJECTION, SOLUTION INTRAVENOUS; SUBCUTANEOUS at 16:32

## 2024-06-09 RX ADMIN — ONDANSETRON 4 MG: 2 INJECTION INTRAMUSCULAR; INTRAVENOUS at 13:13

## 2024-06-09 RX ADMIN — INSULIN LISPRO 2 UNITS: 100 INJECTION, SOLUTION INTRAVENOUS; SUBCUTANEOUS at 08:26

## 2024-06-09 RX ADMIN — SPIRONOLACTONE 25 MG: 25 TABLET ORAL at 16:31

## 2024-06-09 RX ADMIN — PANTOPRAZOLE SODIUM 40 MG: 40 INJECTION, POWDER, FOR SOLUTION INTRAVENOUS at 08:24

## 2024-06-09 RX ADMIN — ANTACID TABLETS 500 MG: 500 TABLET, CHEWABLE ORAL at 10:06

## 2024-06-09 RX ADMIN — CALCIUM CHLORIDE 1 G: 100 INJECTION, SOLUTION INTRAVENOUS at 10:03

## 2024-06-09 RX ADMIN — PIPERACILLIN SODIUM AND TAZOBACTAM SODIUM 3.38 G: 3; .375 INJECTION, SOLUTION INTRAVENOUS at 04:17

## 2024-06-09 RX ADMIN — ROSUVASTATIN 20 MG: 20 TABLET, FILM COATED ORAL at 20:38

## 2024-06-09 RX ADMIN — HEPARIN SODIUM 5000 UNITS: 5000 INJECTION INTRAVENOUS; SUBCUTANEOUS at 00:10

## 2024-06-09 RX ADMIN — ACETAMINOPHEN 1000 MG: 10 INJECTION INTRAVENOUS at 08:24

## 2024-06-09 RX ADMIN — ACETAMINOPHEN 1000 MG: 10 INJECTION INTRAVENOUS at 15:49

## 2024-06-09 RX ADMIN — DOCUSATE SODIUM 100 MG: 100 CAPSULE, LIQUID FILLED ORAL at 20:38

## 2024-06-09 RX ADMIN — Medication 1 L/MIN: at 20:00

## 2024-06-09 RX ADMIN — Medication 1 TABLET: at 10:03

## 2024-06-09 RX ADMIN — ENOXAPARIN SODIUM 40 MG: 40 INJECTION SUBCUTANEOUS at 13:10

## 2024-06-09 RX ADMIN — HYDRALAZINE HYDROCHLORIDE 10 MG: 20 INJECTION INTRAMUSCULAR; INTRAVENOUS at 06:09

## 2024-06-09 RX ADMIN — ONDANSETRON 4 MG: 2 INJECTION INTRAMUSCULAR; INTRAVENOUS at 19:16

## 2024-06-09 RX ADMIN — POLYETHYLENE GLYCOL 3350 17 G: 17 POWDER, FOR SOLUTION ORAL at 10:02

## 2024-06-09 RX ADMIN — TAMSULOSIN HYDROCHLORIDE 0.4 MG: 0.4 CAPSULE ORAL at 08:24

## 2024-06-09 RX ADMIN — HEPARIN SODIUM 5000 UNITS: 5000 INJECTION INTRAVENOUS; SUBCUTANEOUS at 08:25

## 2024-06-09 RX ADMIN — INSULIN LISPRO 2 UNITS: 100 INJECTION, SOLUTION INTRAVENOUS; SUBCUTANEOUS at 04:18

## 2024-06-09 RX ADMIN — LIDOCAINE 4% 2 PATCH: 40 PATCH TOPICAL at 10:03

## 2024-06-09 RX ADMIN — OXYCODONE HYDROCHLORIDE AND ACETAMINOPHEN 500 MG: 500 TABLET ORAL at 10:06

## 2024-06-09 RX ADMIN — HYDRALAZINE HYDROCHLORIDE 10 MG: 20 INJECTION INTRAMUSCULAR; INTRAVENOUS at 11:47

## 2024-06-09 RX ADMIN — ACETAMINOPHEN 1000 MG: 10 INJECTION INTRAVENOUS at 00:10

## 2024-06-09 RX ADMIN — METOPROLOL TARTRATE 50 MG: 50 TABLET, FILM COATED ORAL at 08:25

## 2024-06-09 ASSESSMENT — ENCOUNTER SYMPTOMS
DIFFICULTY URINATING: 0
FREQUENCY: 0
COUGH: 0
FEVER: 0
NAUSEA: 0
PALPITATIONS: 0
DIZZINESS: 0
SHORTNESS OF BREATH: 0
BACK PAIN: 0
DYSURIA: 0
EYE DISCHARGE: 0
SORE THROAT: 0
DIARRHEA: 0
SPEECH DIFFICULTY: 0
TROUBLE SWALLOWING: 0
EYE REDNESS: 0
NECK PAIN: 0
CHILLS: 0
ABDOMINAL DISTENTION: 0
LIGHT-HEADEDNESS: 0
ABDOMINAL PAIN: 1
COLOR CHANGE: 0
CONSTIPATION: 0
ARTHRALGIAS: 0
VOMITING: 0
WEAKNESS: 0

## 2024-06-09 ASSESSMENT — PAIN SCALES - GENERAL
PAINLEVEL_OUTOF10: 7
PAINLEVEL_OUTOF10: 2
PAINLEVEL_OUTOF10: 2
PAINLEVEL_OUTOF10: 0 - NO PAIN
PAINLEVEL_OUTOF10: 8
PAINLEVEL_OUTOF10: 0 - NO PAIN
PAINLEVEL_OUTOF10: 4
PAINLEVEL_OUTOF10: 2
PAINLEVEL_OUTOF10: 0 - NO PAIN
PAINLEVEL_OUTOF10: 4
PAINLEVEL_OUTOF10: 2

## 2024-06-09 ASSESSMENT — PAIN DESCRIPTION - LOCATION
LOCATION: HEAD
LOCATION: ABDOMEN
LOCATION: ABDOMEN

## 2024-06-09 ASSESSMENT — COGNITIVE AND FUNCTIONAL STATUS - GENERAL
DAILY ACTIVITIY SCORE: 21
WALKING IN HOSPITAL ROOM: A LITTLE
PERSONAL GROOMING: A LITTLE
TOILETING: A LITTLE
TURNING FROM BACK TO SIDE WHILE IN FLAT BAD: A LITTLE
EATING MEALS: A LITTLE
MOBILITY SCORE: 20
HELP NEEDED FOR BATHING: A LITTLE
MOVING TO AND FROM BED TO CHAIR: A LITTLE
DRESSING REGULAR LOWER BODY CLOTHING: A LITTLE
TOILETING: A LITTLE
DAILY ACTIVITIY SCORE: 18
MOBILITY SCORE: 15
DRESSING REGULAR LOWER BODY CLOTHING: A LITTLE
CLIMB 3 TO 5 STEPS WITH RAILING: A LITTLE
CLIMB 3 TO 5 STEPS WITH RAILING: TOTAL
STANDING UP FROM CHAIR USING ARMS: A LOT
WALKING IN HOSPITAL ROOM: A LOT
MOVING TO AND FROM BED TO CHAIR: A LITTLE
HELP NEEDED FOR BATHING: A LITTLE
MOVING FROM LYING ON BACK TO SITTING ON SIDE OF FLAT BED WITH BEDRAILS: A LITTLE
DRESSING REGULAR UPPER BODY CLOTHING: A LITTLE

## 2024-06-09 ASSESSMENT — PAIN DESCRIPTION - DESCRIPTORS
DESCRIPTORS: ACHING;DULL

## 2024-06-09 ASSESSMENT — PAIN - FUNCTIONAL ASSESSMENT
PAIN_FUNCTIONAL_ASSESSMENT: 0-10

## 2024-06-09 NOTE — CARE PLAN
Problem: Pain  Goal: My pain/discomfort is manageable  Outcome: Progressing     Problem: Safety  Goal: Patient will be injury free during hospitalization  Outcome: Progressing  Goal: I will remain free of falls  Outcome: Progressing     Problem: Daily Care  Goal: Daily care needs are met  Outcome: Progressing     Problem: Psychosocial Needs  Goal: Demonstrates ability to cope with hospitalization/illness  Outcome: Progressing  Goal: Collaborate with me, my family, and caregiver to identify my specific goals  Outcome: Progressing     Problem: Pain - Adult  Goal: Verbalizes/displays adequate comfort level or baseline comfort level  Outcome: Progressing     Problem: Safety - Adult  Goal: Free from fall injury  Outcome: Progressing     Problem: Discharge Planning  Goal: Discharge to home or other facility with appropriate resources  Outcome: Progressing     Problem: Chronic Conditions and Co-morbidities  Goal: Patient's chronic conditions and co-morbidity symptoms are monitored and maintained or improved  Outcome: Progressing     Problem: Diabetes  Goal: Maintain glucose levels >70mg/dl to <250mg/dl throughout shift  Outcome: Progressing  Goal: No changes in neurological exam by end of shift  Outcome: Progressing  Goal: Vital signs within normal range for age by end of shift  Outcome: Progressing  Goal: Increase self care and/or family involovement by end of shift  Outcome: Progressing     Problem: Pain  Goal: Takes deep breaths with improved pain control throughout the shift  Outcome: Progressing  Goal: Turns in bed with improved pain control throughout the shift  Outcome: Progressing  Goal: Walks with improved pain control throughout the shift  Outcome: Progressing  Goal: Performs ADL's with improved pain control throughout shift  Outcome: Progressing  Goal: Free from opioid side effects throughout the shift  Outcome: Progressing  Goal: Free from acute confusion related to pain meds throughout the shift  Outcome:  Progressing   The patient's goals for the shift include     The clinical goals for the shift include REMOVE NGT, TOLERATE LIQUID DIET

## 2024-06-09 NOTE — PROGRESS NOTES
Kaveh Adkins [id number]   Service: Internal Medicine / Hospitalist Date of service:6/9/2024                          Full Code                Kaveh Adkins is a 69 y.o. male presenting with Generalized abdominal pain.           Subjective      69-year-old male who apparently was awakened from sleep morning of 6/7 with right lower quadrant pain.  He had nausea but no vomiting.  This was sufficient that he sought medical care.  Upon presentation to the ED CT of the abdomen suggested acute appendicitis.  He was taken to the OR today and underwent appendectomy.  Apparently was a difficult procedure initially attempted laparoscopic but due to significant adhesions resulted in open procedure with about 3 hours of meticulous adhesion dissection.  Report of nonruptured appendix.  He is seen in the ICU postoperatively today and is awake alert and interactive appropriately.  His biggest complaint is persistent phlegm in his throat and difficulty expectorating due to abdominal discomfort trying to cough it up.  Expectorating clear mucus.  Blood pressure was 213/99 on presentation.  He does have history of hypertension and describes being on metoprolol and spironolactone as outpatient.  Here we are using metoprolol and hydralazine IV.  We are asked to see the patient for medical management.       5/9/......................Patient endorsed abdominal discomfort at the time of evaluation but stated that this is markedly improved after receiving analgesics just before my arrival.  No reported: Nausea, vomiting, flatus, bowel movement, chest pains or dyspnea.          Review of Systems:   Review of system otherwise negative if not aforementioned above in subjective.    Objective      Physical Exam     Constitutional:       Appearance: Patient appeared in no acute cardiopulmonary distress.     Comments: Patient alert and oriented to person place time and situation.  HEENT:      Head: Normocephalic and atraumatic.Trachea midline       Nose:No observed congestion or rhinorrhea. Nasogastric tube removed.    Mouth/Throat: Mucous membranes Moist, Trachea appeared  midline.  Eyes:      Extraocular Movements: Extraocular movements intact.      Pupils: Pupils are equal, round, and reactive to light.      Comments: No scleral icterus or conjunctival injection appreciated.   Cardiovascular:      Rate and Rhythm: Normal rate and regular rhythm. No clicks rubs or gallops, normal S1 and S2.No peripheral stigmata of endocarditis appreciated.     Pulmonary:      Lungs appeared clear to auscultation, no adventitious sound appreciated.  Abdominal:      General: Abdomen soft,  obese,hypoactive bowel sounds, no involuntary guarding or rebound tenderness appreciated.     Comments: Right lower quadrant tenderness.  Musculoskeletal:       Patient appeared to have full active range of motion for upper and lower extremities, no acute apparent joint deformity appreciated on examination.   No pitting edema or cyanosis appreciated.       Lymphadenopathy:      No appreciable palpable lymphadenopathy  Skin:     General: Skin is warm.      Coloration:  No jaundice     Findings: No abnormal appearing skin rashes or lesions that appeared acute noted on unclothed area of the skin..   Neurological:      General: No focal sensory or motor deficits appreciated, no meningeal signs or dysmetria noted.      Cranial Nerves: Cranial nerves II to XII appearing grossly intact.     Genitals:  Deferred  Psychiatric:         The patient appears to be displaying normal mood and affect at the time of evaluation.    Labs:     Lab Results   Component Value Date    GLUCOSE 147 (H) 06/09/2024    CALCIUM 8.4 (L) 06/09/2024     06/09/2024    K 4.3 06/09/2024    CO2 23 06/09/2024     06/09/2024    BUN 13 06/09/2024    CREATININE 1.28 06/09/2024      Lab Results   Component Value Date    WBC 9.8 06/09/2024    HGB 15.5 06/09/2024    HCT 46.1 06/09/2024    MCV 90 06/09/2024      (L) 06/09/2024      [unfilled]   [unfilled]   No results found for the last 90 days.              X-rays/ Images    [unfilled]   XR chest 1 view [[id number]] Collected: 06/08/24 1615   Order Status: Completed Updated: 06/08/24 1615   Narrative:     Interpreted By:  Mohit Brady,  STUDY:  XR CHEST 1 VIEW; 6/8/2024 2:37 pm      INDICATION:  CLINICAL INFORMATION: Signs/Symptoms:NGT.      COMPARISON:  Abdominal CT 06/27/2024      ACCESSION NUMBER(S):  [id number]      ORDERING CLINICIAN:  RYLIE BAUTISTA      TECHNIQUE:  Portable chest one view.      FINDINGS:  The cardiac size is indeterminate in view of the AP projection.  Nasogastric tube overlies the mediastinum extending into the upper  abdomen. No infiltrates or effusions are identified.  Left basilar  density is thought to be secondary to an epicardial fat pad based on  CT study from 06/07/2024.       Impression:     No acute pathologic findings are identified.  Nasogastric tube extends into the upper abdomen.      MACRO:  none      Signed by: Mohit Brady 6/8/2024 4:14 PM  Dictation workstation:   [id number]   XR abdomen 1 view [[id number]] Collected: 06/08/24 1618   Order Status: Completed Updated: 06/08/24 1618   Narrative:     Interpreted By:  Mohit Brady,  STUDY:  XR ABDOMEN 1 VIEW; 6/8/2024 2:37 pm      INDICATION:  Signs/Symptoms:NGT placement.      COMPARISON:  None.      ACCESSION NUMBER(S):  [id number]      ORDERING CLINICIAN:  RYLIE BAUTISTA      TECHNIQUE:  Upright portable KUB      FINDINGS:  Examination is limited due to the portable technique, patient body  habitus, and upright positioning of the patient. Nonetheless the  nasogastric tube extends into the epigastrium with the tip likely  lying within the body of the stomach. Left upper quadrant right upper  quadrant surgical clips are present. Bowel-gas pattern assessment is  limited by the technique. Nonetheless distended small bowel loops are  suspected within the  midabdomen. Air and fecal material is suspected  to be present within the ascending colon and transverse colon. Pelvis  is omitted from the exam. Intraperitoneal free air is not identified  but sensitivity is likely diminished due to the technique.       Impression:     Nasogastric tube extends into the epigastrium.  Small bowel distention is present.      MACRO:  none      Signed by: Mohit Brady 6/8/2024 4:17 PM  Dictation workstation:   [id number]   CT abdomen pelvis w IV contrast [[id number]] Collected: 06/07/24 2103   Order Status: Completed Updated: 06/07/24 2103   Narrative:     Interpreted By:  Saad Lange,  STUDY:  CT ABDOMEN PELVIS W IV CONTRAST;  6/7/2024 8:32 pm      INDICATION:  Signs/Symptoms:abdominal pain.      COMPARISON:  CT abdomen and pelvis 08/25/2022      ACCESSION NUMBER(S):  [id number]      ORDERING CLINICIAN:  EARL ALSTON      TECHNIQUE:  CT of the abdomen and pelvis was performed.  Standard contiguous  axial images were obtained at 3 mm slice thickness through the  abdomen and pelvis. Coronal and sagittal reconstructions at 3 mm  slice thickness were performed.      100 ml of contrast Omnipaque 350 were administered intravenously  without immediate complication.      FINDINGS:  LOWER CHEST:  Bibasilar atelectasis.      ABDOMEN:      LIVER:  The liver is normal in size without evidence of focal liver lesions.      BILE DUCTS:  The intrahepatic and extrahepatic ducts are not dilated.      GALLBLADDER:  The gallbladder is surgically absent.      PANCREAS:  The pancreas appears unremarkable without evidence of ductal  dilatation or masses.      SPLEEN:  The spleen is normal in size without focal lesions.      ADRENAL GLANDS:  Bilateral adrenal glands appear normal.      KIDNEYS AND URETERS:  Interval resection of previously noted renal mass with cortical  scarring and fat necrosis within the region of the previously noted  mass. Additionally, there is a 3.1 x 1.7 cm soft tissue  attenuation  lesion within the partial nephrectomy bed, possibly sequela of prior  treatment changes. There is mild surrounding soft tissue stranding.  Multiple left simple cysts the largest measures 8.9 cm. No  hydroureteronephrosis or nephroureterolithiasis is identified.      PELVIS:      BLADDER:  The urinary bladder appears normal without abnormal wall thickening.      REPRODUCTIVE ORGANS:  No pelvic masses.      BOWEL:  Small hiatal hernia. The stomach is otherwise unremarkable. No bowel  dilation or significant wall thickening. Moderate colonic stool.  Partial colectomy with anastomotic sutures in the left hemipelvis  appendicolith with distended appendix, measuring up to 16 mm, with  wall thickening hyperemia and surrounding soft tissue stranding  compatible with acute appendicitis.      VESSELS:  There is no aneurysmal dilatation of the abdominal aorta. The IVC  appears normal. Dense atherosclerotic calcifications.      PERITONEUM/RETROPERITONEUM/LYMPH NODES:  There is no free or loculated fluid collection, no free  intraperitoneal air. The retroperitoneum appears normal.  No  abdominopelvic lymphadenopathy is present.      BONES AND ABDOMINAL WALL:  No suspicious osseous lesions are identified. Degenerative discogenic  disease is noted in the lower thoracic and lumbar spine.  Small fat  containing inguinal hernias.       Impression:     1.  Appendicolith with appendiceal dilation, surrounding soft tissue  stranding and wall thickening compatible with acute appendicitis. No  drainable fluid collection or pneumoperitoneum.  2. Interval resection previously noted right renal mass with sequela  of fat necrosis and a 3.1 x 1.7 cm soft tissue attenuation lesion  within the nephrectomy bed. Although this lesion does not appear  aggressive or malignant, further evaluation with MRI of the kidney is  recommended.  3. Other stable findings as described above.          Signed by: Saad Lange 6/7/2024 9:02  PM  Dictation workstation:   [id number]             Medical Problems       Problem List       * (Principal) Generalized abdominal pain    Abdominal pain, generalized    Primary hypertension    Hiatal hernia    NALLELY (obstructive sleep apnea)    Type 2 diabetes mellitus with hyperglycemia (Multi)    Acute appendicitis with localized peritonitis, without perforation or abscess               Above medical problems may be reflective of historical medical problems that may have resolved and may not related to acute clinical condition/medical problems.    Clinical impression/plan:      Principal Problem:    Generalized abdominal pain  Active Problems:    Abdominal pain, generalized    Primary hypertension    Hiatal hernia    NALLELY (obstructive sleep apnea)    Type 2 diabetes mellitus with hyperglycemia (Multi)    Acute appendicitis with localized peritonitis, without perforation or abscess     1.  Acute appendicitis  -Operative day for open appendectomy complicated by extensive adhesions.  Appendix was found nonruptured.  Continues on Zosyn.     2.  HTN  -Significantly elevated on presentation likely related to acute appendicitis pain.  -Remains moderately elevated postoperatively and as n.p.o. he is on IV metoprolol and IV hydralazine.  Normally takes metoprolol and spironolactone he describes.     3.  DM 2  -Apparently was at 1 time on Ozempic but it became too expensive.  He states now he is on glipizide and feels he is under good control.  Check hemoglobin A1c.  SSI/Accu-Cheks     4.  CKD  -Does not follow with nephrologist.  Last available creatinine in EMR 1.87 from August 2022.  -Continue to follow but appears likely roughly at baseline.     5.  DVT prophylaxis  -SCDs, otherwise per primary service.      Disposition/additional care plan/interventions:6/9/2024      Nasogastric tube removed with clear liquid diet ordered by general surgery.    Blood pressure elevated this a.m., patient nurse at the bedside plan to  implement schedule antihypertensive therapy and rechecking of blood pressure and adjusting therapy as needed.    Monitor BP closely resume oral antihypertensive therapy, monitor hemodynamic status closely.    Continue IV as needed antihypertensive therapy.    Add spironolactone..................Given reported use by patient.  Verify home medications.    Blood glucose levels appeared improved from patient reported home baselines of 200 and above blood sugars .  Continue corrective insulin sliding scale and hypoglycemic protocol.    Monitor renal indices.    Stool softener per general surgery.    Monitor respiratory disposition closely with narcotics use, as needed Narcan.          The patient was informed of differential diagnosis , work up , plan of care and possible sequelae of clinical disposition.Patient in agreement with plan of care. Further recommendations forthcoming in accordance with patient's clinical disposition and response to care.    Discharge planning:Discharge timing in accordance with admitting service/general surgery.    Care time: < 55 mins           Dictation performed with assistance of voice recognition device therefore transcription errors are possible.

## 2024-06-10 ENCOUNTER — APPOINTMENT (OUTPATIENT)
Dept: RADIOLOGY | Facility: HOSPITAL | Age: 69
End: 2024-06-10
Payer: MEDICARE

## 2024-06-10 LAB
ALBUMIN SERPL BCP-MCNC: 3.7 G/DL (ref 3.4–5)
ALP SERPL-CCNC: 91 U/L (ref 33–136)
ALT SERPL W P-5'-P-CCNC: 20 U/L (ref 10–52)
ANION GAP SERPL CALC-SCNC: 16 MMOL/L (ref 10–20)
ANION GAP SERPL CALC-SCNC: 16 MMOL/L (ref 10–20)
AST SERPL W P-5'-P-CCNC: 13 U/L (ref 9–39)
ATRIAL RATE: 83 BPM
BILIRUB DIRECT SERPL-MCNC: 0.2 MG/DL (ref 0–0.3)
BILIRUB SERPL-MCNC: 1.7 MG/DL (ref 0–1.2)
BUN SERPL-MCNC: 18 MG/DL (ref 6–23)
BUN SERPL-MCNC: 24 MG/DL (ref 6–23)
CALCIUM SERPL-MCNC: 9.2 MG/DL (ref 8.6–10.3)
CALCIUM SERPL-MCNC: 9.3 MG/DL (ref 8.6–10.3)
CHLORIDE SERPL-SCNC: 102 MMOL/L (ref 98–107)
CHLORIDE SERPL-SCNC: 103 MMOL/L (ref 98–107)
CO2 SERPL-SCNC: 22 MMOL/L (ref 21–32)
CO2 SERPL-SCNC: 23 MMOL/L (ref 21–32)
CREAT SERPL-MCNC: 1.38 MG/DL (ref 0.5–1.3)
CREAT SERPL-MCNC: 1.53 MG/DL (ref 0.5–1.3)
EGFRCR SERPLBLD CKD-EPI 2021: 50 ML/MIN/1.73M*2
EGFRCR SERPLBLD CKD-EPI 2021: 56 ML/MIN/1.73M*2
GLUCOSE BLD MANUAL STRIP-MCNC: 150 MG/DL (ref 74–99)
GLUCOSE BLD MANUAL STRIP-MCNC: 156 MG/DL (ref 74–99)
GLUCOSE BLD MANUAL STRIP-MCNC: 226 MG/DL (ref 74–99)
GLUCOSE BLD MANUAL STRIP-MCNC: 241 MG/DL (ref 74–99)
GLUCOSE SERPL-MCNC: 142 MG/DL (ref 74–99)
GLUCOSE SERPL-MCNC: 217 MG/DL (ref 74–99)
MAGNESIUM SERPL-MCNC: 2.15 MG/DL (ref 1.6–2.4)
P AXIS: 26 DEGREES
PHOSPHATE SERPL-MCNC: 2.9 MG/DL (ref 2.5–4.9)
POTASSIUM SERPL-SCNC: 3.7 MMOL/L (ref 3.5–5.3)
POTASSIUM SERPL-SCNC: 4.1 MMOL/L (ref 3.5–5.3)
PR INTERVAL: 266 MS
PREALB SERPL-MCNC: 13.8 MG/DL (ref 18–40)
PROT SERPL-MCNC: 6.8 G/DL (ref 6.4–8.2)
Q ONSET: 253 MS
QRS COUNT: 13 BEATS
QRS DURATION: 103 MS
QT INTERVAL: 382 MS
QTC CALCULATION(BAZETT): 449 MS
QTC FREDERICIA: 425 MS
R AXIS: -48 DEGREES
SODIUM SERPL-SCNC: 136 MMOL/L (ref 136–145)
SODIUM SERPL-SCNC: 138 MMOL/L (ref 136–145)
T AXIS: 34 DEGREES
T OFFSET: 444 MS
VENTRICULAR RATE: 83 BPM

## 2024-06-10 PROCEDURE — 82374 ASSAY BLOOD CARBON DIOXIDE: CPT | Performed by: SURGERY

## 2024-06-10 PROCEDURE — 82947 ASSAY GLUCOSE BLOOD QUANT: CPT

## 2024-06-10 PROCEDURE — 82947 ASSAY GLUCOSE BLOOD QUANT: CPT | Mod: 91

## 2024-06-10 PROCEDURE — 84134 ASSAY OF PREALBUMIN: CPT | Mod: PORLAB | Performed by: SURGERY

## 2024-06-10 PROCEDURE — 99231 SBSQ HOSP IP/OBS SF/LOW 25: CPT | Performed by: SURGERY

## 2024-06-10 PROCEDURE — 36415 COLL VENOUS BLD VENIPUNCTURE: CPT | Performed by: SURGERY

## 2024-06-10 PROCEDURE — 2500000005 HC RX 250 GENERAL PHARMACY W/O HCPCS: Performed by: ANESTHESIOLOGY

## 2024-06-10 PROCEDURE — 84075 ASSAY ALKALINE PHOSPHATASE: CPT | Performed by: SURGERY

## 2024-06-10 PROCEDURE — 99233 SBSQ HOSP IP/OBS HIGH 50: CPT | Performed by: HOSPITALIST

## 2024-06-10 PROCEDURE — 84134 ASSAY OF PREALBUMIN: CPT | Mod: 91,PORLAB,MUE | Performed by: SURGERY

## 2024-06-10 PROCEDURE — 74018 RADEX ABDOMEN 1 VIEW: CPT

## 2024-06-10 PROCEDURE — 2500000002 HC RX 250 W HCPCS SELF ADMINISTERED DRUGS (ALT 637 FOR MEDICARE OP, ALT 636 FOR OP/ED): Performed by: SURGERY

## 2024-06-10 PROCEDURE — 2500000005 HC RX 250 GENERAL PHARMACY W/O HCPCS: Performed by: HOSPITALIST

## 2024-06-10 PROCEDURE — 2500000005 HC RX 250 GENERAL PHARMACY W/O HCPCS: Performed by: SURGERY

## 2024-06-10 PROCEDURE — C9113 INJ PANTOPRAZOLE SODIUM, VIA: HCPCS | Performed by: SURGERY

## 2024-06-10 PROCEDURE — 2500000001 HC RX 250 WO HCPCS SELF ADMINISTERED DRUGS (ALT 637 FOR MEDICARE OP): Performed by: SURGERY

## 2024-06-10 PROCEDURE — 1200000002 HC GENERAL ROOM WITH TELEMETRY DAILY

## 2024-06-10 PROCEDURE — 80048 BASIC METABOLIC PNL TOTAL CA: CPT | Mod: CCI | Performed by: SURGERY

## 2024-06-10 PROCEDURE — 83735 ASSAY OF MAGNESIUM: CPT | Performed by: SURGERY

## 2024-06-10 PROCEDURE — 84100 ASSAY OF PHOSPHORUS: CPT | Performed by: SURGERY

## 2024-06-10 PROCEDURE — 2500000004 HC RX 250 GENERAL PHARMACY W/ HCPCS (ALT 636 FOR OP/ED): Performed by: SURGERY

## 2024-06-10 RX ORDER — SODIUM CHLORIDE, SODIUM LACTATE, POTASSIUM CHLORIDE, CALCIUM CHLORIDE 600; 310; 30; 20 MG/100ML; MG/100ML; MG/100ML; MG/100ML
125 INJECTION, SOLUTION INTRAVENOUS CONTINUOUS
Status: DISCONTINUED | OUTPATIENT
Start: 2024-06-10 | End: 2024-06-12

## 2024-06-10 RX ORDER — LIDOCAINE HYDROCHLORIDE 20 MG/ML
1 JELLY TOPICAL ONCE
Status: COMPLETED | OUTPATIENT
Start: 2024-06-10 | End: 2024-06-10

## 2024-06-10 RX ORDER — KETOROLAC TROMETHAMINE 30 MG/ML
15 INJECTION, SOLUTION INTRAMUSCULAR; INTRAVENOUS EVERY 8 HOURS
Status: DISCONTINUED | OUTPATIENT
Start: 2024-06-10 | End: 2024-06-11

## 2024-06-10 RX ORDER — INSULIN LISPRO 100 [IU]/ML
0-10 INJECTION, SOLUTION INTRAVENOUS; SUBCUTANEOUS EVERY 4 HOURS
Status: DISCONTINUED | OUTPATIENT
Start: 2024-06-10 | End: 2024-06-13 | Stop reason: HOSPADM

## 2024-06-10 RX ORDER — ACETAMINOPHEN 10 MG/ML
1000 INJECTION, SOLUTION INTRAVENOUS EVERY 8 HOURS
Status: DISCONTINUED | OUTPATIENT
Start: 2024-06-10 | End: 2024-06-13

## 2024-06-10 RX ORDER — HYDROMORPHONE HYDROCHLORIDE 1 MG/ML
0.6 INJECTION, SOLUTION INTRAMUSCULAR; INTRAVENOUS; SUBCUTANEOUS EVERY 6 HOURS PRN
Status: DISCONTINUED | OUTPATIENT
Start: 2024-06-10 | End: 2024-06-11

## 2024-06-10 RX ORDER — DEXTROSE 50 % IN WATER (D50W) INTRAVENOUS SYRINGE
12.5
Status: DISCONTINUED | OUTPATIENT
Start: 2024-06-10 | End: 2024-06-13 | Stop reason: HOSPADM

## 2024-06-10 RX ORDER — METOPROLOL TARTRATE 1 MG/ML
5 INJECTION, SOLUTION INTRAVENOUS EVERY 6 HOURS
Status: DISCONTINUED | OUTPATIENT
Start: 2024-06-10 | End: 2024-06-10

## 2024-06-10 RX ORDER — LIDOCAINE HYDROCHLORIDE 20 MG/ML
1 JELLY TOPICAL ONCE
Status: DISCONTINUED | OUTPATIENT
Start: 2024-06-10 | End: 2024-06-10

## 2024-06-10 RX ORDER — DEXTROSE 50 % IN WATER (D50W) INTRAVENOUS SYRINGE
25
Status: DISCONTINUED | OUTPATIENT
Start: 2024-06-10 | End: 2024-06-13 | Stop reason: HOSPADM

## 2024-06-10 RX ORDER — HYDROMORPHONE HYDROCHLORIDE 0.2 MG/ML
0.2 INJECTION INTRAMUSCULAR; INTRAVENOUS; SUBCUTANEOUS EVERY 4 HOURS PRN
Status: DISCONTINUED | OUTPATIENT
Start: 2024-06-10 | End: 2024-06-12

## 2024-06-10 RX ORDER — METOPROLOL TARTRATE 1 MG/ML
5 INJECTION, SOLUTION INTRAVENOUS EVERY 6 HOURS
Status: DISCONTINUED | OUTPATIENT
Start: 2024-06-10 | End: 2024-06-11

## 2024-06-10 RX ORDER — PANTOPRAZOLE SODIUM 40 MG/10ML
40 INJECTION, POWDER, LYOPHILIZED, FOR SOLUTION INTRAVENOUS DAILY
Status: DISCONTINUED | OUTPATIENT
Start: 2024-06-10 | End: 2024-06-12

## 2024-06-10 RX ORDER — NICARDIPINE HYDROCHLORIDE 0.2 MG/ML
2.5-15 INJECTION INTRAVENOUS CONTINUOUS
Status: DISCONTINUED | OUTPATIENT
Start: 2024-06-10 | End: 2024-06-11

## 2024-06-10 RX ORDER — METOCLOPRAMIDE HYDROCHLORIDE 5 MG/ML
10 INJECTION INTRAMUSCULAR; INTRAVENOUS EVERY 8 HOURS
Status: DISCONTINUED | OUTPATIENT
Start: 2024-06-10 | End: 2024-06-11

## 2024-06-10 RX ORDER — HYDRALAZINE HYDROCHLORIDE 20 MG/ML
5 INJECTION INTRAMUSCULAR; INTRAVENOUS EVERY 4 HOURS PRN
Status: DISCONTINUED | OUTPATIENT
Start: 2024-06-10 | End: 2024-06-11

## 2024-06-10 RX ADMIN — ACETAMINOPHEN 1000 MG: 10 INJECTION INTRAVENOUS at 23:48

## 2024-06-10 RX ADMIN — INSULIN LISPRO 2 UNITS: 100 INJECTION, SOLUTION INTRAVENOUS; SUBCUTANEOUS at 23:49

## 2024-06-10 RX ADMIN — INSULIN LISPRO 4 UNITS: 100 INJECTION, SOLUTION INTRAVENOUS; SUBCUTANEOUS at 08:16

## 2024-06-10 RX ADMIN — SODIUM CHLORIDE, POTASSIUM CHLORIDE, SODIUM LACTATE AND CALCIUM CHLORIDE 100 ML/HR: 600; 310; 30; 20 INJECTION, SOLUTION INTRAVENOUS at 08:13

## 2024-06-10 RX ADMIN — KETOROLAC TROMETHAMINE 15 MG: 30 INJECTION, SOLUTION INTRAMUSCULAR at 10:31

## 2024-06-10 RX ADMIN — METOCLOPRAMIDE 10 MG: 5 INJECTION, SOLUTION INTRAMUSCULAR; INTRAVENOUS at 06:30

## 2024-06-10 RX ADMIN — ACETAMINOPHEN 1000 MG: 10 INJECTION INTRAVENOUS at 16:21

## 2024-06-10 RX ADMIN — METOPROLOL TARTRATE 5 MG: 5 INJECTION INTRAVENOUS at 22:09

## 2024-06-10 RX ADMIN — Medication 21 PERCENT: at 08:00

## 2024-06-10 RX ADMIN — NICARDIPINE HYDROCHLORIDE 2.5 MG/HR: 0.2 INJECTION, SOLUTION INTRAVENOUS at 23:49

## 2024-06-10 RX ADMIN — INSULIN LISPRO 4 UNITS: 100 INJECTION, SOLUTION INTRAVENOUS; SUBCUTANEOUS at 11:27

## 2024-06-10 RX ADMIN — PHENOL 1 SPRAY: 1.4 SPRAY ORAL at 09:13

## 2024-06-10 RX ADMIN — METOCLOPRAMIDE 10 MG: 5 INJECTION, SOLUTION INTRAMUSCULAR; INTRAVENOUS at 22:09

## 2024-06-10 RX ADMIN — METOPROLOL TARTRATE 5 MG: 5 INJECTION INTRAVENOUS at 16:40

## 2024-06-10 RX ADMIN — PANTOPRAZOLE SODIUM 40 MG: 40 INJECTION, POWDER, FOR SOLUTION INTRAVENOUS at 06:00

## 2024-06-10 RX ADMIN — NICARDIPINE HYDROCHLORIDE 2.5 MG/HR: 0.2 INJECTION, SOLUTION INTRAVENOUS at 05:59

## 2024-06-10 RX ADMIN — ENOXAPARIN SODIUM 40 MG: 40 INJECTION SUBCUTANEOUS at 08:14

## 2024-06-10 RX ADMIN — KETOROLAC TROMETHAMINE 15 MG: 30 INJECTION, SOLUTION INTRAMUSCULAR at 18:00

## 2024-06-10 RX ADMIN — INSULIN LISPRO 2 UNITS: 100 INJECTION, SOLUTION INTRAVENOUS; SUBCUTANEOUS at 17:19

## 2024-06-10 RX ADMIN — LIDOCAINE HYDROCHLORIDE 1 APPLICATION: 20 JELLY TOPICAL at 08:14

## 2024-06-10 RX ADMIN — METOCLOPRAMIDE 10 MG: 5 INJECTION, SOLUTION INTRAMUSCULAR; INTRAVENOUS at 13:55

## 2024-06-10 RX ADMIN — SODIUM CHLORIDE, POTASSIUM CHLORIDE, SODIUM LACTATE AND CALCIUM CHLORIDE 100 ML/HR: 600; 310; 30; 20 INJECTION, SOLUTION INTRAVENOUS at 16:42

## 2024-06-10 RX ADMIN — HYDROMORPHONE HYDROCHLORIDE 0.4 MG: 0.5 INJECTION, SOLUTION INTRAMUSCULAR; INTRAVENOUS; SUBCUTANEOUS at 19:38

## 2024-06-10 RX ADMIN — ONDANSETRON 4 MG: 2 INJECTION INTRAMUSCULAR; INTRAVENOUS at 06:14

## 2024-06-10 RX ADMIN — ACETAMINOPHEN 1000 MG: 10 INJECTION INTRAVENOUS at 09:13

## 2024-06-10 RX ADMIN — LIDOCAINE 4% 2 PATCH: 40 PATCH TOPICAL at 08:14

## 2024-06-10 RX ADMIN — HYDRALAZINE HYDROCHLORIDE 10 MG: 20 INJECTION INTRAMUSCULAR; INTRAVENOUS at 01:37

## 2024-06-10 ASSESSMENT — COGNITIVE AND FUNCTIONAL STATUS - GENERAL
CLIMB 3 TO 5 STEPS WITH RAILING: A LITTLE
TURNING FROM BACK TO SIDE WHILE IN FLAT BAD: A LITTLE
MOBILITY SCORE: 18
WALKING IN HOSPITAL ROOM: A LITTLE
DAILY ACTIVITIY SCORE: 21
DRESSING REGULAR LOWER BODY CLOTHING: A LITTLE
HELP NEEDED FOR BATHING: A LITTLE
MOVING FROM LYING ON BACK TO SITTING ON SIDE OF FLAT BED WITH BEDRAILS: A LITTLE
MOVING TO AND FROM BED TO CHAIR: A LITTLE
DRESSING REGULAR UPPER BODY CLOTHING: A LITTLE
STANDING UP FROM CHAIR USING ARMS: A LITTLE

## 2024-06-10 ASSESSMENT — PAIN DESCRIPTION - LOCATION
LOCATION: ABDOMEN
LOCATION: ABDOMEN
LOCATION: HEAD

## 2024-06-10 ASSESSMENT — PAIN - FUNCTIONAL ASSESSMENT
PAIN_FUNCTIONAL_ASSESSMENT: 0-10

## 2024-06-10 ASSESSMENT — PAIN DESCRIPTION - ORIENTATION
ORIENTATION: LEFT;RIGHT;MID
ORIENTATION: RIGHT;LEFT

## 2024-06-10 ASSESSMENT — PAIN SCALES - GENERAL
PAINLEVEL_OUTOF10: 0 - NO PAIN
PAINLEVEL_OUTOF10: 1
PAINLEVEL_OUTOF10: 0 - NO PAIN
PAINLEVEL_OUTOF10: 8
PAINLEVEL_OUTOF10: 2
PAINLEVEL_OUTOF10: 2
PAINLEVEL_OUTOF10: 0 - NO PAIN
PAINLEVEL_OUTOF10: 2

## 2024-06-10 ASSESSMENT — PAIN DESCRIPTION - DESCRIPTORS
DESCRIPTORS: DISCOMFORT
DESCRIPTORS: ACHING
DESCRIPTORS: ACHING

## 2024-06-10 NOTE — PROGRESS NOTES
Physical Therapy SCREEN                 Therapy Communication Note    Patient Name: Kaveh Adkins  MRN: [id number]  Today's Date: 6/10/2024     Discipline: Physical Therapy SCREEN/DISCH    Comment:pt seen earlier for OT EVAL: found pt to only need min A for bed transfer sup<>sit, only needed SUPV for amb with FWW.  Pt stated that he did not want therapy, defer further mobility assist to nursing staff--NO NEED FOR SKILLED PT.

## 2024-06-10 NOTE — CONSULTS
Nutrition Initial Assessment:   Nutrition Assessment    Reason for Assessment: Provider consult order    Medical history per chart: Kaveh Adkins is a 69 y.o. male on day 1 of admission presenting with Generalized abdominal pain. PMHx includes SBO, HTN, DM2, CKD, skin cancer. Admitted for acute appendicitis with recent appendectomy on 6/8/24.    Nutrition History:      6/10: Patient awake and alert with 2 visitors in room at time of visit. Notes that appetite was good prior to admission and had recent intentional weight loss as noted over the past ~4 years per weight hx. No unintentional loss noted. Reports that he was started on a clear liquid diet yesterday, but this was promptly discontinued following continued abdominal pain. NG tube for suctioning of stomach contents was re-placed and diet changed to NPO. Will continue to follow and monitor per protocol and adjust recommendations as diet advances.     Current Diet: NPO Diet; Effective now  Supplement(s): No  Average meal Intake during admission: NPO   Average supplement intake during admission: none ordered at this time     Nutrition Related Findings:   Oral Symptoms: none     GI symptoms: abdominal pain.   BM: Last BM Date: 06/06/24  Wound Type: surgical Incision (umbilicus). (nursing/wound notes provide further details)  Edema: No  Food allergies: NKFA. is allergic to cephalosporins, ciprofloxacin, cefuroxime axetil, doxycycline, furosemide, hydrochlorothiazide, and metformin hcl.  Meds/Labs reviewed.  acetaminophen, 1,000 mg, intravenous, q8h  [Held by provider] ascorbic acid, 500 mg, oral, Daily  benzocaine-menthol, 1 lozenge, Mouth/Throat, q6h  [Held by provider] docusate sodium, 100 mg, oral, BID  enoxaparin, 40 mg, subcutaneous, Daily  insulin lispro, 0-10 Units, subcutaneous, q4h  ketorolac, 15 mg, intravenous, q8h  lidocaine, 2 patch, transdermal, Daily  metoclopramide, 10 mg, intravenous, q8h  [Held by provider] metoprolol tartrate, 50 mg, oral,  BID  [Held by provider] multivitamin with minerals, 1 tablet, oral, Daily  oxygen, , inhalation, Continuous - Inhalation  pantoprazole, 40 mg, intravenous, Daily  phenoL, 1 spray, Mouth/Throat, q6h  [Held by provider] polyethylene glycol, 17 g, oral, Daily  [Held by provider] rosuvastatin, 20 mg, oral, Nightly  [Held by provider] spironolactone, 25 mg, oral, Daily  [Held by provider] tamsulosin, 0.4 mg, oral, Daily  [Held by provider] zinc sulfate, 50 mg of elemental zinc, oral, Daily       lactated Ringer's, 100 mL/hr, Last Rate: 100 mL/hr (06/10/24 0813)  niCARdipine, 2.5-15 mg/hr, Last Rate: 2.5 mg/hr (06/10/24 1100)         Nutrition Significant Labs:  Results from last 7 days   Lab Units 06/10/24  0527 06/09/24  0526 06/08/24  1455 06/08/24  0342   GLUCOSE mg/dL 217* 147* 200* 153*   SODIUM mmol/L 136 136 136 136   POTASSIUM mmol/L 4.1 4.3 4.2 4.1   CHLORIDE mmol/L 102 105 103 105   CO2 mmol/L 22 23 23 23   BUN mg/dL 18 13 15 14   CREATININE mg/dL 1.38* 1.28 1.55* 1.31*   EGFR mL/min/1.73m*2 56* 61 49* 60*   CALCIUM mg/dL 9.3 8.4* 8.2* 8.3*   PHOSPHORUS mg/dL 2.9  --  4.0 2.7   MAGNESIUM mg/dL 2.15 2.07 1.71 1.62     Lab Results   Component Value Date    HGBA1C 6.9 (A) 12/19/2021    HGBA1C 7.4 (A) 09/17/2021     Results from last 7 days   Lab Units 06/10/24  0731 06/09/24  1954 06/09/24  1623 06/09/24  1146 06/09/24  0811 06/09/24  0416 06/09/24  0005 06/08/24 1959   POCT GLUCOSE mg/dL 241* 176* 180* 186* 161* 157* 114* 156*       Anthropometrics:  Height: 182.9 cm (6')   Weight: 121 kg (266 lb 15.6 oz)   BMI (Calculated): 36.2  IBW/kg (Dietitian Calculated): 81 kg  Percent of IBW: 150 %     BMI Amputation Adjustment: No    Weight History:   Wt Readings from Last 10 Encounters:   06/08/24 121 kg (266 lb 15.6 oz)   10/21/22 118 kg (260 lb 11.1 oz)   10/04/21 131 kg (288 lb 6 oz)   09/28/21 133 kg (293 lb 8.7 oz)   10/14/20 138 kg (305 lb 5.4 oz)   09/28/20 (!) 139 kg (306 lb)   01/02/20 136 kg (300 lb)    10/09/19 136 kg (299 lb 13.2 oz)        Weight Change %:  Weight History / % Weight Change: Weight history appears stable with noted gradual loss since September of 2020.  Significant Weight Loss: No          Nutrition Focused Physical Exam Findings:  defer: Patient appears well nourished with no visible losses noted.       Estimated Needs:   Total Energy Estimated Needs (kCal):  (2025-2430)  Method for Estimating Needs: 25-30kcal/kg IBW  Total Protein Estimated Needs (g):  (81-97.2)  Method for Estimating Needs: 97.2-105.3  Total Fluid Estimated Needs (mL):  (2025-2430)  Method for Estimating Needs: 1mL/kcal        Nutrition Diagnosis   Nutrition Diagnosis:  Malnutrition Diagnosis  Patient has Malnutrition Diagnosis: No    Nutrition Diagnosis  Patient has Nutrition Diagnosis: Yes  Diagnosis Status (1): New  Nutrition Diagnosis 1: Inadequate oral intake  Related to (1): GI surgery  As Evidenced by (1): NPO status d/t abdominal pain       Nutrition Interventions/Recommendations   Nutrition Interventions and Recommendations:        Nutrition Prescription:  Individualized Nutrition Prescription Provided for : Inadequate oral intake. Unable to initiate nutrition interventions at this time d/t NPO status. Will follow and monitor for diet advancement.        Nutrition Interventions:   Food and/or Nutrient Delivery Interventions  Additional Interventions: Recommend when able Advance to Consistent Carbohydrate diet    Coordination of Nutrition Care by a Nutrition Professional  Collaboration and Referral of Nutrition Care: Other (Comment)  Goal: RN    Nutrition Education:   Education Documentation  No documentation found.      Nutrition Counseling  Counseling Theoretical Approach: Nutrition counseling based on health belief model  Goal: Reviewed NPO diet, and advancement process.  Menu options.       Nutrition Monitoring and Evaluation   Monitoring/Evaluation:   Food/Nutrient Related History Monitoring  Monitoring and  Evaluation Plan: Energy intake  Criteria: Diet advancement  Additional Plans: Follow for supplement needs    Body Composition/Growth/Weight History  Monitoring and Evaluation Plan: Weight  Weight: Measured weight  Criteria: Stable weight    Biochemical Data, Medical Tests and Procedures  Monitoring and Evaluation Plan: Electrolyte/renal panel, Glucose/endocrine profile  Electrolyte and Renal Panel: BUN, Creatinine, Magnesium, Potassium, Sodium  Criteria: WNL  Glucose/Endocrine Profile: Glucose, casual  Criteria: WNL    Nutrition Focused Physical Findings  Monitoring and Evaluation Plan: Adipose, Muscles, Skin  Adipose: Other (Comment)  Criteria: Monitor for losses  Muscles: Other (Comment)  Criteria: Monitor for losses  Skin: Impaired wound healing  Criteria: Promote healing            Time Spent/Follow-up Reminder:   Follow Up  Time Spent (min): 40 minutes  Last Date of Nutrition Visit: 06/10/24  Nutrition Follow-Up Needed?: Dietitian to reassess per policy  Follow up Comment: 6/13-6/16

## 2024-06-10 NOTE — PROGRESS NOTES
Kaveh Adkins is a 69 y.o. male on day 2 of admission presenting with Generalized abdominal pain.    Subjective   Tolerating clrs but having a lot of burping and belching.  No flatus.  Hypertensive overnight.  Denies CP/SOB.         Objective   Heart Rate:  []   Temp:  [36.6 °C (97.9 °F)-37.7 °C (99.9 °F)]   Resp:  [6-31]   BP: (141-206)/()   SpO2:  [91 %-95 %]   I/O last 3 completed shifts:  In: 2577.9 (21.3 mL/kg) [P.O.:960; I.V.:1267.9 (10.5 mL/kg); IV Piggyback:350]  Out: 2250 (18.6 mL/kg) [Urine:2250 (0.5 mL/kg/hr)]  Weight: 121.1 kg   No intake/output data recorded.  Physical Exam  Vitals reviewed.   Abdominal:      General: There is distension.      Tenderness: There is no guarding or rebound.      Comments: Midline wound with dry bloody crust  Staples intact  Abd distended, tympanitic, appropriately tender   Skin:     General: Skin is warm.      Capillary Refill: Capillary refill takes less than 2 seconds.   Neurological:      General: No focal deficit present.      Mental Status: He is alert.   Psychiatric:         Mood and Affect: Mood normal.       Results for orders placed or performed during the hospital encounter of 06/07/24 (from the past 24 hour(s))   POCT GLUCOSE   Result Value Ref Range    POCT Glucose 186 (H) 74 - 99 mg/dL   POCT GLUCOSE   Result Value Ref Range    POCT Glucose 180 (H) 74 - 99 mg/dL   POCT GLUCOSE   Result Value Ref Range    POCT Glucose 176 (H) 74 - 99 mg/dL   Basic Metabolic Panel   Result Value Ref Range    Glucose 217 (H) 74 - 99 mg/dL    Sodium 136 136 - 145 mmol/L    Potassium 4.1 3.5 - 5.3 mmol/L    Chloride 102 98 - 107 mmol/L    Bicarbonate 22 21 - 32 mmol/L    Anion Gap 16 10 - 20 mmol/L    Urea Nitrogen 18 6 - 23 mg/dL    Creatinine 1.38 (H) 0.50 - 1.30 mg/dL    eGFR 56 (L) >60 mL/min/1.73m*2    Calcium 9.3 8.6 - 10.3 mg/dL   Magnesium   Result Value Ref Range    Magnesium 2.15 1.60 - 2.40 mg/dL   Phosphorus   Result Value Ref Range    Phosphorus 2.9 2.5 -  4.9 mg/dL   Hepatic Function Panel   Result Value Ref Range    Albumin 3.7 3.4 - 5.0 g/dL    Bilirubin, Total 1.7 (H) 0.0 - 1.2 mg/dL    Bilirubin, Direct 0.2 0.0 - 0.3 mg/dL    Alkaline Phosphatase 91 33 - 136 U/L    ALT 20 10 - 52 U/L    AST 13 9 - 39 U/L    Total Protein 6.8 6.4 - 8.2 g/dL   POCT GLUCOSE   Result Value Ref Range    POCT Glucose 241 (H) 74 - 99 mg/dL     XR abdomen 1 view 06/10/2024    Narrative  Interpreted By:  Elisha Graham,  STUDY:  XR ABDOMEN 1 VIEW;  6/10/2024 9:08 am    INDICATION:  Signs/Symptoms:NG tube placement.    COMPARISON:  06/10/2024 at 6:09 a.m.    ACCESSION NUMBER(S):  [id number]    ORDERING CLINICIAN:  RYLIE BAUTISTA    FINDINGS:  NG tube tip projects within the region of the gastric fundus.  Side-hole is close to the EG junction in the region of the gastric  cardia. Small-bowel gaseous dilatation is similar to the earlier  study. Gastric air appears decreased. Patchy colon air is noted.  Abdominal midline skin staples are present. Limited evaluation of  pneumoperitoneum on supine imaging, however no gross evidence of free  air is noted.    Visualized lungs are clear.    Osseous structures demonstrate no acute bony changes.    Impression  1.  NG tube is been inserted with the tip in the region of the fundus  of the stomach and the side hole close to the junction in the region  of the gastric cardia  2. Decreased gastric air is noted from prior study but colonic and  small bowel air in ileus pattern is unchanged.    MACRO:  None    Signed by: Elisha Graham 6/10/2024 9:31 AM  Dictation workstation:   [id number]      Assessment/Plan   Principal Problem:    Generalized abdominal pain  Active Problems:    Acute appendicitis with localized peritonitis, without perforation or abscess    Abdominal pain, generalized    Primary hypertension    Hiatal hernia    NALLELY (obstructive sleep apnea)    Type 2 diabetes mellitus with hyperglycemia (Multi)  69M POD2 s/p open  appendectomy with extensive LILY now with post-op ileus  - NGT in, PPI daily, routine flushing, reglan ATC  - cardene gtt; keep whilst has NGT in place  - aggressive PT/OT and amb at least TID; sits in chair otherwise and not spending time in bed  - NPO/IVF  - IV tylenol; minimise narcs    I spent 45 minutes in the professional and overall care of this patient.      Misha Stafford MD

## 2024-06-10 NOTE — PROGRESS NOTES
Kaveh Adkins [id number]   Service: Internal Medicine / Hospitalist Date of service:6/10/2024                                  Full Code                     Kaveh Adkins is a 69 y.o. male presenting with Generalized abdominal pain.               Subjective      69-year-old male who apparently was awakened from sleep morning of 6/7 with right lower quadrant pain.  He had nausea but no vomiting.  This was sufficient that he sought medical care.  Upon presentation to the ED CT of the abdomen suggested acute appendicitis.  He was taken to the OR today and underwent appendectomy.  Apparently was a difficult procedure initially attempted laparoscopic but due to significant adhesions resulted in open procedure with about 3 hours of meticulous adhesion dissection.  Report of nonruptured appendix.  He is seen in the ICU postoperatively today and is awake alert and interactive appropriately.  His biggest complaint is persistent phlegm in his throat and difficulty expectorating due to abdominal discomfort trying to cough it up.  Expectorating clear mucus.  Blood pressure was 213/99 on presentation.  He does have history of hypertension and describes being on metoprolol and spironolactone as outpatient.  Here we are using metoprolol and hydralazine IV.  We are asked to see the patient for medical management.        6/9/......................Patient endorsed abdominal discomfort at the time of evaluation but stated that this is markedly improved after receiving analgesics just before my arrival.  No reported: Nausea, vomiting, flatus, bowel movement, chest pains or dyspnea.         6/10..........................Nasogastric tube back in place at time of evaluation this morning.  Patient reported overall he is doing fair.  No acute complaints voiced by patient.  Cardene drip implemented overnight.  No reported chest pains, headaches, fevers, chills or palpitations.     Review of Systems:   Review of system otherwise negative  if not aforementioned above in subjective.     Objective        Physical Exam      Constitutional:       Appearance: Patient appeared in no acute cardiopulmonary distress.     Comments: Patient alert and oriented to person place time and situation.  HEENT:      Head: Normocephalic and atraumatic.Trachea midline      Nose:No observed congestion or rhinorrhea.     Mouth/Throat: Mucous membranes Moist, Trachea appeared  midline.  Eyes:      Extraocular Movements: Extraocular movements intact.      Pupils: Pupils are equal, round, and reactive to light.      Comments: No scleral icterus or conjunctival injection appreciated.   Cardiovascular:      Rate and Rhythm: Normal rate and regular rhythm. No clicks rubs or gallops, normal S1 and S2.No peripheral stigmata of endocarditis appreciated.     Pulmonary:      Lungs appeared clear to auscultation, no adventitious sound appreciated.  Abdominal:      General: Abdomen soft,  obese,hypoactive bowel sounds, no involuntary guarding or rebound tenderness appreciated.     Comments: Right lower quadrant tenderness.  Musculoskeletal:       Patient appeared to have full active range of motion for upper and lower extremities, no acute apparent joint deformity appreciated on examination.   No pitting edema or cyanosis appreciated.       Lymphadenopathy:      No appreciable palpable lymphadenopathy  Skin:     General: Skin is warm.      Coloration:  No jaundice     Findings: No abnormal appearing skin rashes or lesions that appeared acute noted on unclothed area of the skin..   Neurological:      General: No focal sensory or motor deficits appreciated, no meningeal signs or dysmetria noted.      Cranial Nerves: Cranial nerves II to XII appearing grossly intact.     Genitals:  Deferred  Psychiatric:         The patient appears to be displaying normal mood and affect at the time of evaluation.     Labs:     Lab Results   Component Value Date    GLUCOSE 217 (H) 06/10/2024    CALCIUM 9.3  06/10/2024     06/10/2024    K 4.1 06/10/2024    CO2 22 06/10/2024     06/10/2024    BUN 18 06/10/2024    CREATININE 1.38 (H) 06/10/2024      Lab Results   Component Value Date    WBC 9.8 06/09/2024    HGB 15.5 06/09/2024    HCT 46.1 06/09/2024    MCV 90 06/09/2024     (L) 06/09/2024         [unfilled]   [unfilled]   No results found for the last 90 days.                  X-rays/ Images     [unfilled]   XR chest 1 view [[id number]] Collected: 06/08/24 1615   Order Status: Completed Updated: 06/08/24 1615   Narrative:     Interpreted By:  Mohit Brady,  STUDY:  XR CHEST 1 VIEW; 6/8/2024 2:37 pm      INDICATION:  CLINICAL INFORMATION: Signs/Symptoms:NGT.      COMPARISON:  Abdominal CT 06/27/2024      ACCESSION NUMBER(S):  [id number]      ORDERING CLINICIAN:  RYLIE BAUTISTA      TECHNIQUE:  Portable chest one view.      FINDINGS:  The cardiac size is indeterminate in view of the AP projection.  Nasogastric tube overlies the mediastinum extending into the upper  abdomen. No infiltrates or effusions are identified.  Left basilar  density is thought to be secondary to an epicardial fat pad based on  CT study from 06/07/2024.       Impression:     No acute pathologic findings are identified.  Nasogastric tube extends into the upper abdomen.      MACRO:  none      Signed by: Mohit Brady 6/8/2024 4:14 PM  Dictation workstation:   [id number]   XR abdomen 1 view [[id number]] Collected: 06/08/24 1618   Order Status: Completed Updated: 06/08/24 1618   Narrative:     Interpreted By:  Mohit Brady,  STUDY:  XR ABDOMEN 1 VIEW; 6/8/2024 2:37 pm      INDICATION:  Signs/Symptoms:NGT placement.      COMPARISON:  None.      ACCESSION NUMBER(S):  [id number]      ORDERING CLINICIAN:  RYLIE BAUTISTA      TECHNIQUE:  Upright portable KUB      FINDINGS:  Examination is limited due to the portable technique, patient body  habitus, and upright positioning of the patient. Nonetheless  the  nasogastric tube extends into the epigastrium with the tip likely  lying within the body of the stomach. Left upper quadrant right upper  quadrant surgical clips are present. Bowel-gas pattern assessment is  limited by the technique. Nonetheless distended small bowel loops are  suspected within the midabdomen. Air and fecal material is suspected  to be present within the ascending colon and transverse colon. Pelvis  is omitted from the exam. Intraperitoneal free air is not identified  but sensitivity is likely diminished due to the technique.       Impression:     Nasogastric tube extends into the epigastrium.  Small bowel distention is present.      MACRO:  none      Signed by: Mohit Brady 6/8/2024 4:17 PM  Dictation workstation:   [id number]   CT abdomen pelvis w IV contrast [[id number]] Collected: 06/07/24 2103   Order Status: Completed Updated: 06/07/24 2103   Narrative:     Interpreted By:  Saad Lange,  STUDY:  CT ABDOMEN PELVIS W IV CONTRAST;  6/7/2024 8:32 pm      INDICATION:  Signs/Symptoms:abdominal pain.      COMPARISON:  CT abdomen and pelvis 08/25/2022      ACCESSION NUMBER(S):  [id number]      ORDERING CLINICIAN:  EARL ALSTON      TECHNIQUE:  CT of the abdomen and pelvis was performed.  Standard contiguous  axial images were obtained at 3 mm slice thickness through the  abdomen and pelvis. Coronal and sagittal reconstructions at 3 mm  slice thickness were performed.      100 ml of contrast Omnipaque 350 were administered intravenously  without immediate complication.      FINDINGS:  LOWER CHEST:  Bibasilar atelectasis.      ABDOMEN:      LIVER:  The liver is normal in size without evidence of focal liver lesions.      BILE DUCTS:  The intrahepatic and extrahepatic ducts are not dilated.      GALLBLADDER:  The gallbladder is surgically absent.      PANCREAS:  The pancreas appears unremarkable without evidence of ductal  dilatation or masses.      SPLEEN:  The spleen is normal in size  without focal lesions.      ADRENAL GLANDS:  Bilateral adrenal glands appear normal.      KIDNEYS AND URETERS:  Interval resection of previously noted renal mass with cortical  scarring and fat necrosis within the region of the previously noted  mass. Additionally, there is a 3.1 x 1.7 cm soft tissue attenuation  lesion within the partial nephrectomy bed, possibly sequela of prior  treatment changes. There is mild surrounding soft tissue stranding.  Multiple left simple cysts the largest measures 8.9 cm. No  hydroureteronephrosis or nephroureterolithiasis is identified.      PELVIS:      BLADDER:  The urinary bladder appears normal without abnormal wall thickening.      REPRODUCTIVE ORGANS:  No pelvic masses.      BOWEL:  Small hiatal hernia. The stomach is otherwise unremarkable. No bowel  dilation or significant wall thickening. Moderate colonic stool.  Partial colectomy with anastomotic sutures in the left hemipelvis  appendicolith with distended appendix, measuring up to 16 mm, with  wall thickening hyperemia and surrounding soft tissue stranding  compatible with acute appendicitis.      VESSELS:  There is no aneurysmal dilatation of the abdominal aorta. The IVC  appears normal. Dense atherosclerotic calcifications.      PERITONEUM/RETROPERITONEUM/LYMPH NODES:  There is no free or loculated fluid collection, no free  intraperitoneal air. The retroperitoneum appears normal.  No  abdominopelvic lymphadenopathy is present.      BONES AND ABDOMINAL WALL:  No suspicious osseous lesions are identified. Degenerative discogenic  disease is noted in the lower thoracic and lumbar spine.  Small fat  containing inguinal hernias.       Impression:     1.  Appendicolith with appendiceal dilation, surrounding soft tissue  stranding and wall thickening compatible with acute appendicitis. No  drainable fluid collection or pneumoperitoneum.  2. Interval resection previously noted right renal mass with sequela  of fat necrosis and  a 3.1 x 1.7 cm soft tissue attenuation lesion  within the nephrectomy bed. Although this lesion does not appear  aggressive or malignant, further evaluation with MRI of the kidney is  recommended.  3. Other stable findings as described above.          Signed by: Saad Lange 6/7/2024 9:02 PM  Dictation workstation:   [id number]                  Medical Problems         Problem List         * (Principal) Generalized abdominal pain     Abdominal pain, generalized     Primary hypertension     Hiatal hernia     NALLELY (obstructive sleep apnea)     Type 2 diabetes mellitus with hyperglycemia (Multi)     Acute appendicitis with localized peritonitis, without perforation or abscess                  Above medical problems may be reflective of historical medical problems that may have resolved and may not related to acute clinical condition/medical problems.     Clinical impression/plan:       Principal Problem:    Generalized abdominal pain  Active Problems:    Abdominal pain, generalized    Primary hypertension    Hiatal hernia    NALLELY (obstructive sleep apnea)    Type 2 diabetes mellitus with hyperglycemia (Multi)    Acute appendicitis with localized peritonitis, without perforation or abscess     1.  Acute appendicitis  -Operative day for open appendectomy complicated by extensive adhesions.  Appendix was found nonruptured.  Continues on Zosyn.     2.  HTN  -Significantly elevated on presentation likely related to acute appendicitis pain.  -Remains moderately elevated postoperatively and as n.p.o. he is on IV metoprolol and IV hydralazine.  Normally takes metoprolol and spironolactone he describes.     3.  DM 2  -Apparently was at 1 time on Ozempic but it became too expensive.  He states now he is on glipizide and feels he is under good control.  Check hemoglobin A1c.  SSI/Accu-Cheks     4.  CKD  -Does not follow with nephrologist.  Last available creatinine in EMR 1.87 from August 2022.  -Continue to follow but  appears likely roughly at baseline.     5.  DVT prophylaxis  -SCDs, otherwise per primary service.        Disposition/additional care plan/interventions:6/9/2024        Nasogastric tube removed with clear liquid diet ordered by general surgery.     Blood pressure elevated this a.m., patient nurse at the bedside plan to implement schedule antihypertensive therapy and rechecking of blood pressure and adjusting therapy as needed.     Monitor BP closely resume oral antihypertensive therapy, monitor hemodynamic status closely.     Continue IV as needed antihypertensive therapy.     Add spironolactone..................Given reported use by patient.  Verify home medications.     Blood glucose levels appeared improved from patient reported home baselines of 200 and above blood sugars .  Continue corrective insulin sliding scale and hypoglycemic protocol.     Monitor renal indices.     Stool softener per general surgery.     Monitor respiratory disposition closely with narcotics use, as needed Narcan.      Disposition/additional care plan/interventions:6/10/2024      Anticipation of weaning off Cardizem drip.    Until oral antihypertensive therapy or okay by general surgery recommend implemented IV metoprolol substituted for daily beta-blocker use.    Continue as needed hydralazine    With n.p.o. statusparticularly  if for a long duration recommend avoiding starvation ketosis, consider adding dextrose to maintain to maintenance IV.  Monitor potassium levels closely particular given renal disposition.    Continue insulin sliding scale.    Close outpatient follow-up with family physician recommended to continue optimization of blood pressure and blood glucose levels.    In light of  NSAID use recommend close monitoring of renal function.      The patient was informed of differential diagnosis , work up , plan of care and possible sequelae of clinical disposition.Patient in agreement with plan of care. Further recommendations  forthcoming in accordance with patient's clinical disposition and response to care.     Discharge planning:Discharge timing in accordance with admitting service/general surgery.     Care time: > 55 mins              Dictation performed with assistance of voice recognition device therefore transcription errors are possible.

## 2024-06-10 NOTE — PROGRESS NOTES
Occupational Therapy                 Therapy Communication Note - Screen/OT DC    Patient Name: Kaveh Adkins  MRN: [id number]  Today's Date: 6/10/2024     Discipline: Occupational Therapy      Comment: Pt seen bedside  from [id number]. RN reporting pt has been up with assist ambulating in room and on unit. Pt performing transfers and functional mobility with FWW with supervision, BUE strength/ AROM WFL, denies any concerns with self -care. Pt feels he does not need therapy  and requesting no further therapy at this time. Defer further mobility assist to nursing staff, pt agreeable. No further skilled OT needs at this time

## 2024-06-10 NOTE — PROGRESS NOTES
06/10/24 1600   Discharge Planning   Living Arrangements Family members   Support Systems Family members   Assistance Needed none   Type of Residence Private residence   Home or Post Acute Services None   Patient expects to be discharged to: home   Does the patient need discharge transport arranged? No     Discharge planning assessment completed with patient. Patient is from home with his ex wife and their family. He is independent at baseline, and was screened out by PT/OT today. Patient confirmed his PCP is Ashvin Quinonez. Patient plans to return home at discharge and denies needs at this time. He currently has an NG to [facility]. Care Transitions is following for any potential discharge needs that arise.

## 2024-06-11 ENCOUNTER — APPOINTMENT (OUTPATIENT)
Dept: RADIOLOGY | Facility: HOSPITAL | Age: 69
End: 2024-06-11
Payer: MEDICARE

## 2024-06-11 VITALS
TEMPERATURE: 98.4 F | HEIGHT: 72 IN | SYSTOLIC BLOOD PRESSURE: 197 MMHG | WEIGHT: 266.98 LBS | RESPIRATION RATE: 18 BRPM | DIASTOLIC BLOOD PRESSURE: 80 MMHG | BODY MASS INDEX: 36.16 KG/M2 | OXYGEN SATURATION: 92 % | HEART RATE: 80 BPM

## 2024-06-11 LAB
ANION GAP SERPL CALC-SCNC: 14 MMOL/L (ref 10–20)
ANION GAP SERPL CALC-SCNC: 14 MMOL/L (ref 10–20)
BASOPHILS # BLD AUTO: 0.02 X10*3/UL (ref 0–0.1)
BASOPHILS NFR BLD AUTO: 0.2 %
BUN SERPL-MCNC: 28 MG/DL (ref 6–23)
BUN SERPL-MCNC: 30 MG/DL (ref 6–23)
CALCIUM SERPL-MCNC: 8.8 MG/DL (ref 8.6–10.3)
CALCIUM SERPL-MCNC: 8.9 MG/DL (ref 8.6–10.3)
CHLORIDE SERPL-SCNC: 104 MMOL/L (ref 98–107)
CHLORIDE SERPL-SCNC: 106 MMOL/L (ref 98–107)
CO2 SERPL-SCNC: 23 MMOL/L (ref 21–32)
CO2 SERPL-SCNC: 24 MMOL/L (ref 21–32)
CREAT SERPL-MCNC: 1.4 MG/DL (ref 0.5–1.3)
CREAT SERPL-MCNC: 1.5 MG/DL (ref 0.5–1.3)
EGFRCR SERPLBLD CKD-EPI 2021: 51 ML/MIN/1.73M*2
EGFRCR SERPLBLD CKD-EPI 2021: 55 ML/MIN/1.73M*2
EOSINOPHIL # BLD AUTO: 0.07 X10*3/UL (ref 0–0.7)
EOSINOPHIL NFR BLD AUTO: 0.9 %
ERYTHROCYTE [DISTWIDTH] IN BLOOD BY AUTOMATED COUNT: 13.5 % (ref 11.5–14.5)
GLUCOSE BLD MANUAL STRIP-MCNC: 104 MG/DL (ref 74–99)
GLUCOSE BLD MANUAL STRIP-MCNC: 112 MG/DL (ref 74–99)
GLUCOSE BLD MANUAL STRIP-MCNC: 155 MG/DL (ref 74–99)
GLUCOSE BLD MANUAL STRIP-MCNC: 165 MG/DL (ref 74–99)
GLUCOSE BLD MANUAL STRIP-MCNC: 178 MG/DL (ref 74–99)
GLUCOSE SERPL-MCNC: 110 MG/DL (ref 74–99)
GLUCOSE SERPL-MCNC: 168 MG/DL (ref 74–99)
HCT VFR BLD AUTO: 44.7 % (ref 41–52)
HGB BLD-MCNC: 14.9 G/DL (ref 13.5–17.5)
IMM GRANULOCYTES # BLD AUTO: 0.02 X10*3/UL (ref 0–0.7)
IMM GRANULOCYTES NFR BLD AUTO: 0.2 % (ref 0–0.9)
LYMPHOCYTES # BLD AUTO: 0.8 X10*3/UL (ref 1.2–4.8)
LYMPHOCYTES NFR BLD AUTO: 9.9 %
MAGNESIUM SERPL-MCNC: 2.02 MG/DL (ref 1.6–2.4)
MCH RBC QN AUTO: 30 PG (ref 26–34)
MCHC RBC AUTO-ENTMCNC: 33.3 G/DL (ref 32–36)
MCV RBC AUTO: 90 FL (ref 80–100)
MONOCYTES # BLD AUTO: 0.88 X10*3/UL (ref 0.1–1)
MONOCYTES NFR BLD AUTO: 10.9 %
NEUTROPHILS # BLD AUTO: 6.31 X10*3/UL (ref 1.2–7.7)
NEUTROPHILS NFR BLD AUTO: 77.9 %
NRBC BLD-RTO: 0 /100 WBCS (ref 0–0)
PHOSPHATE SERPL-MCNC: 2.5 MG/DL (ref 2.5–4.9)
PLATELET # BLD AUTO: 131 X10*3/UL (ref 150–450)
POTASSIUM SERPL-SCNC: 3.5 MMOL/L (ref 3.5–5.3)
POTASSIUM SERPL-SCNC: 4.8 MMOL/L (ref 3.5–5.3)
PREALB SERPL-MCNC: 10.7 MG/DL (ref 18–40)
RBC # BLD AUTO: 4.96 X10*6/UL (ref 4.5–5.9)
SODIUM SERPL-SCNC: 137 MMOL/L (ref 136–145)
SODIUM SERPL-SCNC: 139 MMOL/L (ref 136–145)
WBC # BLD AUTO: 8.1 X10*3/UL (ref 4.4–11.3)

## 2024-06-11 PROCEDURE — 74019 RADEX ABDOMEN 2 VIEWS: CPT

## 2024-06-11 PROCEDURE — 85025 COMPLETE CBC W/AUTO DIFF WBC: CPT | Performed by: SURGERY

## 2024-06-11 PROCEDURE — 84100 ASSAY OF PHOSPHORUS: CPT | Performed by: SURGERY

## 2024-06-11 PROCEDURE — 74019 RADEX ABDOMEN 2 VIEWS: CPT | Performed by: RADIOLOGY

## 2024-06-11 PROCEDURE — 99024 POSTOP FOLLOW-UP VISIT: CPT | Performed by: SURGERY

## 2024-06-11 PROCEDURE — 99232 SBSQ HOSP IP/OBS MODERATE 35: CPT | Performed by: HOSPITALIST

## 2024-06-11 PROCEDURE — 36415 COLL VENOUS BLD VENIPUNCTURE: CPT | Performed by: SURGERY

## 2024-06-11 PROCEDURE — 80048 BASIC METABOLIC PNL TOTAL CA: CPT | Performed by: SURGERY

## 2024-06-11 PROCEDURE — C9113 INJ PANTOPRAZOLE SODIUM, VIA: HCPCS | Performed by: SURGERY

## 2024-06-11 PROCEDURE — 2500000005 HC RX 250 GENERAL PHARMACY W/O HCPCS: Performed by: HOSPITALIST

## 2024-06-11 PROCEDURE — 1200000002 HC GENERAL ROOM WITH TELEMETRY DAILY

## 2024-06-11 PROCEDURE — 2500000004 HC RX 250 GENERAL PHARMACY W/ HCPCS (ALT 636 FOR OP/ED): Performed by: SURGERY

## 2024-06-11 PROCEDURE — 2500000001 HC RX 250 WO HCPCS SELF ADMINISTERED DRUGS (ALT 637 FOR MEDICARE OP): Performed by: SURGERY

## 2024-06-11 PROCEDURE — 83735 ASSAY OF MAGNESIUM: CPT | Performed by: SURGERY

## 2024-06-11 PROCEDURE — 80048 BASIC METABOLIC PNL TOTAL CA: CPT | Mod: 91 | Performed by: SURGERY

## 2024-06-11 PROCEDURE — 82947 ASSAY GLUCOSE BLOOD QUANT: CPT | Mod: 91

## 2024-06-11 RX ORDER — ACETAMINOPHEN 325 MG/1
650 TABLET ORAL DAILY
Status: ON HOLD | COMMUNITY
End: 2024-06-13

## 2024-06-11 RX ORDER — FLUTICASONE PROPIONATE 50 MCG
2 SPRAY, SUSPENSION (ML) NASAL DAILY
COMMUNITY

## 2024-06-11 RX ORDER — METOCLOPRAMIDE HYDROCHLORIDE 5 MG/ML
10 INJECTION INTRAMUSCULAR; INTRAVENOUS EVERY 8 HOURS SCHEDULED
Status: DISCONTINUED | OUTPATIENT
Start: 2024-06-11 | End: 2024-06-12

## 2024-06-11 RX ORDER — ROSUVASTATIN CALCIUM 20 MG/1
1 TABLET, COATED ORAL DAILY
COMMUNITY

## 2024-06-11 RX ORDER — SPIRONOLACTONE 25 MG/1
1 TABLET ORAL DAILY
COMMUNITY

## 2024-06-11 RX ORDER — LANCETS 30 GAUGE
EACH MISCELLANEOUS
COMMUNITY
Start: 2023-12-15

## 2024-06-11 RX ORDER — METOPROLOL TARTRATE 1 MG/ML
5 INJECTION, SOLUTION INTRAVENOUS
Status: DISCONTINUED | OUTPATIENT
Start: 2024-06-11 | End: 2024-06-11

## 2024-06-11 RX ORDER — ASPIRIN 81 MG/1
81 TABLET ORAL DAILY
COMMUNITY

## 2024-06-11 RX ORDER — GLIPIZIDE 10 MG/1
10 TABLET ORAL DAILY
COMMUNITY
Start: 2023-08-07

## 2024-06-11 RX ORDER — METOCLOPRAMIDE HYDROCHLORIDE 5 MG/ML
10 INJECTION INTRAMUSCULAR; INTRAVENOUS EVERY 6 HOURS SCHEDULED
Status: DISCONTINUED | OUTPATIENT
Start: 2024-06-11 | End: 2024-06-11

## 2024-06-11 RX ORDER — POTASSIUM CHLORIDE 14.9 MG/ML
20 INJECTION INTRAVENOUS
Status: COMPLETED | OUTPATIENT
Start: 2024-06-11 | End: 2024-06-11

## 2024-06-11 RX ORDER — METOPROLOL TARTRATE 50 MG/1
1 TABLET ORAL 2 TIMES DAILY
COMMUNITY
End: 2024-06-13 | Stop reason: HOSPADM

## 2024-06-11 RX ORDER — METOPROLOL TARTRATE 1 MG/ML
5 INJECTION, SOLUTION INTRAVENOUS ONCE
Status: COMPLETED | OUTPATIENT
Start: 2024-06-11 | End: 2024-06-11

## 2024-06-11 RX ORDER — BLOOD SUGAR DIAGNOSTIC
STRIP MISCELLANEOUS
COMMUNITY
Start: 2024-05-22

## 2024-06-11 RX ORDER — TAMSULOSIN HYDROCHLORIDE 0.4 MG/1
1 CAPSULE ORAL DAILY
COMMUNITY

## 2024-06-11 RX ADMIN — INSULIN LISPRO 2 UNITS: 100 INJECTION, SOLUTION INTRAVENOUS; SUBCUTANEOUS at 04:49

## 2024-06-11 RX ADMIN — METOCLOPRAMIDE 10 MG: 5 INJECTION, SOLUTION INTRAMUSCULAR; INTRAVENOUS at 11:24

## 2024-06-11 RX ADMIN — NICARDIPINE HYDROCHLORIDE 12.5 MG/HR: 0.2 INJECTION, SOLUTION INTRAVENOUS at 03:05

## 2024-06-11 RX ADMIN — METOCLOPRAMIDE 10 MG: 5 INJECTION, SOLUTION INTRAMUSCULAR; INTRAVENOUS at 21:58

## 2024-06-11 RX ADMIN — METOPROLOL TARTRATE 5 MG: 5 INJECTION INTRAVENOUS at 11:24

## 2024-06-11 RX ADMIN — ACETAMINOPHEN 1000 MG: 10 INJECTION INTRAVENOUS at 18:22

## 2024-06-11 RX ADMIN — POTASSIUM CHLORIDE 20 MEQ: 14.9 INJECTION, SOLUTION INTRAVENOUS at 13:59

## 2024-06-11 RX ADMIN — KETOROLAC TROMETHAMINE 15 MG: 30 INJECTION, SOLUTION INTRAMUSCULAR at 03:05

## 2024-06-11 RX ADMIN — POTASSIUM CHLORIDE 20 MEQ: 14.9 INJECTION, SOLUTION INTRAVENOUS at 11:21

## 2024-06-11 RX ADMIN — ACETAMINOPHEN 1000 MG: 10 INJECTION INTRAVENOUS at 09:18

## 2024-06-11 RX ADMIN — METOCLOPRAMIDE 10 MG: 5 INJECTION, SOLUTION INTRAMUSCULAR; INTRAVENOUS at 06:25

## 2024-06-11 RX ADMIN — SODIUM CHLORIDE, POTASSIUM CHLORIDE, SODIUM LACTATE AND CALCIUM CHLORIDE 1000 ML: 600; 310; 30; 20 INJECTION, SOLUTION INTRAVENOUS at 09:58

## 2024-06-11 RX ADMIN — SODIUM CHLORIDE, POTASSIUM CHLORIDE, SODIUM LACTATE AND CALCIUM CHLORIDE 100 ML/HR: 600; 310; 30; 20 INJECTION, SOLUTION INTRAVENOUS at 03:05

## 2024-06-11 RX ADMIN — KETOROLAC TROMETHAMINE 15 MG: 30 INJECTION, SOLUTION INTRAMUSCULAR at 11:24

## 2024-06-11 RX ADMIN — INSULIN LISPRO 2 UNITS: 100 INJECTION, SOLUTION INTRAVENOUS; SUBCUTANEOUS at 09:08

## 2024-06-11 RX ADMIN — BENZOCAINE AND MENTHOL 1 LOZENGE: 15; 3.6 LOZENGE ORAL at 09:05

## 2024-06-11 RX ADMIN — METOPROLOL TARTRATE 5 MG: 5 INJECTION INTRAVENOUS at 06:25

## 2024-06-11 RX ADMIN — PANTOPRAZOLE SODIUM 40 MG: 40 INJECTION, POWDER, FOR SOLUTION INTRAVENOUS at 09:06

## 2024-06-11 RX ADMIN — ENOXAPARIN SODIUM 40 MG: 40 INJECTION SUBCUTANEOUS at 09:05

## 2024-06-11 RX ADMIN — METOPROLOL TARTRATE 5 MG: 5 INJECTION INTRAVENOUS at 16:19

## 2024-06-11 ASSESSMENT — PAIN DESCRIPTION - LOCATION: LOCATION: ABDOMEN

## 2024-06-11 ASSESSMENT — PAIN SCALES - GENERAL
PAINLEVEL_OUTOF10: 1
PAINLEVEL_OUTOF10: 1
PAINLEVEL_OUTOF10: 0 - NO PAIN

## 2024-06-11 ASSESSMENT — PAIN - FUNCTIONAL ASSESSMENT
PAIN_FUNCTIONAL_ASSESSMENT: 0-10

## 2024-06-11 ASSESSMENT — PAIN DESCRIPTION - ORIENTATION: ORIENTATION: MID

## 2024-06-11 NOTE — PROGRESS NOTES
Kaveh Adkins [id number]   Service: Internal Medicine / Hospitalist Date of service:6/11/2024                                  Full Code                     Kaveh Adkins is a 69 y.o. male presenting with Generalized abdominal pain.               Subjective      69-year-old male who apparently was awakened from sleep morning of 6/7 with right lower quadrant pain.  He had nausea but no vomiting.  This was sufficient that he sought medical care.  Upon presentation to the ED CT of the abdomen suggested acute appendicitis.  He was taken to the OR today and underwent appendectomy.  Apparently was a difficult procedure initially attempted laparoscopic but due to significant adhesions resulted in open procedure with about 3 hours of meticulous adhesion dissection.  Report of nonruptured appendix.  He is seen in the ICU postoperatively today and is awake alert and interactive appropriately.  His biggest complaint is persistent phlegm in his throat and difficulty expectorating due to abdominal discomfort trying to cough it up.  Expectorating clear mucus.  Blood pressure was 213/99 on presentation.  He does have history of hypertension and describes being on metoprolol and spironolactone as outpatient.  Here we are using metoprolol and hydralazine IV.  We are asked to see the patient for medical management.        6/9/......................Patient endorsed abdominal discomfort at the time of evaluation but stated that this is markedly improved after receiving analgesics just before my arrival.  No reported: Nausea, vomiting, flatus, bowel movement, chest pains or dyspnea.         6/10..........................Nasogastric tube back in place at time of evaluation this morning.  Patient reported overall he is doing fair.  No acute complaints voiced by patient.  Cardene drip implemented overnight.  No reported chest pains, headaches, fevers, chills or palpitations.    6/11................Patient seen and examined in ICU this  morning.  Patient reported overall is doing very well.  On arrival it appeared to the third floor this afternoon refused hydralazine reported to nurse that hydralazine wrecked his kidneys in the past.  Hydralazine was stopped.  No reported palpitations, headaches, chest pains, fevers, chills, nausea, vomiting or abdominal pain.     Review of Systems:   Review of system otherwise negative if not aforementioned above in subjective.     Objective        Physical Exam      Constitutional:       Appearance: Patient appeared in no acute cardiopulmonary distress.     Comments: Patient alert and oriented to person place time and situation.  HEENT:      Head: Normocephalic and atraumatic.Trachea midline      Nose:No observed congestion or rhinorrhea.     Mouth/Throat: Mucous membranes Moist, Trachea appeared  midline.  Eyes:      Extraocular Movements: Extraocular movements intact.      Pupils: Pupils are equal, round, and reactive to light.      Comments: No scleral icterus or conjunctival injection appreciated.   Cardiovascular:      Rate and Rhythm: Normal rate and regular rhythm. No clicks rubs or gallops, normal S1 and S2.No peripheral stigmata of endocarditis appreciated.     Pulmonary:      Lung bases could not be auscultated in light of abdominal binder, lung fields appeared clear and apices, no adventitious sound appreciated.  Abdominal:      General: Abdomen soft,  obese,hypoactive bowel sounds, no involuntary guarding or rebound tenderness appreciated.     Comments: Exam examination limited secondary to abdominal binder.  Musculoskeletal:       Patient appeared to have full active range of motion for upper and lower extremities, no acute apparent joint deformity appreciated on examination.   No pitting edema or cyanosis appreciated.       Lymphadenopathy:      No appreciable palpable lymphadenopathy  Skin:     General: Skin is warm.      Coloration:  No jaundice     Findings: No abnormal appearing skin rashes or  lesions that appeared acute noted on unclothed area of the skin..   Neurological:      General: No focal sensory or motor deficits appreciated, no meningeal signs or dysmetria noted.      Cranial Nerves: Cranial nerves II to XII appearing grossly intact.     Genitals:  Deferred  Psychiatric:         The patient appears to be displaying normal mood and affect at the time of evaluation.     Labs:           Lab Results   Component Value Date     Lab Results   Component Value Date    GLUCOSE 168 (H) 06/11/2024    CALCIUM 8.9 06/11/2024     06/11/2024    K 3.5 06/11/2024    CO2 23 06/11/2024     06/11/2024    BUN 28 (H) 06/11/2024    CREATININE 1.50 (H) 06/11/2024      Lab Results   Component Value Date    WBC 8.1 06/11/2024    HGB 14.9 06/11/2024    HCT 44.7 06/11/2024    MCV 90 06/11/2024     (L) 06/11/2024            [unfilled]   [unfilled]   No results found for the last 90 days.                  X-rays/ Images     [unfilled]   XR chest 1 view [[id number]] Collected: 06/08/24 1615   Order Status: Completed Updated: 06/08/24 1615   Narrative:     Interpreted By:  Mohit Brady,  STUDY:  XR CHEST 1 VIEW; 6/8/2024 2:37 pm      INDICATION:  CLINICAL INFORMATION: Signs/Symptoms:NGT.      COMPARISON:  Abdominal CT 06/27/2024      ACCESSION NUMBER(S):  [id number]      ORDERING CLINICIAN:  RYLIE BAUTISTA      TECHNIQUE:  Portable chest one view.      FINDINGS:  The cardiac size is indeterminate in view of the AP projection.  Nasogastric tube overlies the mediastinum extending into the upper  abdomen. No infiltrates or effusions are identified.  Left basilar  density is thought to be secondary to an epicardial fat pad based on  CT study from 06/07/2024.       Impression:     No acute pathologic findings are identified.  Nasogastric tube extends into the upper abdomen.      MACRO:  none      Signed by: Mohit Brady 6/8/2024 4:14 PM  Dictation workstation:   [id number]   XR abdomen 1  view [[id number]] Collected: 06/08/24 1618   Order Status: Completed Updated: 06/08/24 1618   Narrative:     Interpreted By:  Mohit Brady,  STUDY:  XR ABDOMEN 1 VIEW; 6/8/2024 2:37 pm      INDICATION:  Signs/Symptoms:NGT placement.      COMPARISON:  None.      ACCESSION NUMBER(S):  [id number]      ORDERING CLINICIAN:  RYLIE BAUTISTA      TECHNIQUE:  Upright portable KUB      FINDINGS:  Examination is limited due to the portable technique, patient body  habitus, and upright positioning of the patient. Nonetheless the  nasogastric tube extends into the epigastrium with the tip likely  lying within the body of the stomach. Left upper quadrant right upper  quadrant surgical clips are present. Bowel-gas pattern assessment is  limited by the technique. Nonetheless distended small bowel loops are  suspected within the midabdomen. Air and fecal material is suspected  to be present within the ascending colon and transverse colon. Pelvis  is omitted from the exam. Intraperitoneal free air is not identified  but sensitivity is likely diminished due to the technique.       Impression:     Nasogastric tube extends into the epigastrium.  Small bowel distention is present.      MACRO:  none      Signed by: Mohit Brady 6/8/2024 4:17 PM  Dictation workstation:   [id number]   CT abdomen pelvis w IV contrast [[id number]] Collected: 06/07/24 2103   Order Status: Completed Updated: 06/07/24 2103   Narrative:     Interpreted By:  Saad Lange,  STUDY:  CT ABDOMEN PELVIS W IV CONTRAST;  6/7/2024 8:32 pm      INDICATION:  Signs/Symptoms:abdominal pain.      COMPARISON:  CT abdomen and pelvis 08/25/2022      ACCESSION NUMBER(S):  [id number]      ORDERING CLINICIAN:  EARL ALSTON      TECHNIQUE:  CT of the abdomen and pelvis was performed.  Standard contiguous  axial images were obtained at 3 mm slice thickness through the  abdomen and pelvis. Coronal and sagittal reconstructions at 3 mm  slice thickness were performed.       100 ml of contrast Omnipaque 350 were administered intravenously  without immediate complication.      FINDINGS:  LOWER CHEST:  Bibasilar atelectasis.      ABDOMEN:      LIVER:  The liver is normal in size without evidence of focal liver lesions.      BILE DUCTS:  The intrahepatic and extrahepatic ducts are not dilated.      GALLBLADDER:  The gallbladder is surgically absent.      PANCREAS:  The pancreas appears unremarkable without evidence of ductal  dilatation or masses.      SPLEEN:  The spleen is normal in size without focal lesions.      ADRENAL GLANDS:  Bilateral adrenal glands appear normal.      KIDNEYS AND URETERS:  Interval resection of previously noted renal mass with cortical  scarring and fat necrosis within the region of the previously noted  mass. Additionally, there is a 3.1 x 1.7 cm soft tissue attenuation  lesion within the partial nephrectomy bed, possibly sequela of prior  treatment changes. There is mild surrounding soft tissue stranding.  Multiple left simple cysts the largest measures 8.9 cm. No  hydroureteronephrosis or nephroureterolithiasis is identified.      PELVIS:      BLADDER:  The urinary bladder appears normal without abnormal wall thickening.      REPRODUCTIVE ORGANS:  No pelvic masses.      BOWEL:  Small hiatal hernia. The stomach is otherwise unremarkable. No bowel  dilation or significant wall thickening. Moderate colonic stool.  Partial colectomy with anastomotic sutures in the left hemipelvis  appendicolith with distended appendix, measuring up to 16 mm, with  wall thickening hyperemia and surrounding soft tissue stranding  compatible with acute appendicitis.      VESSELS:  There is no aneurysmal dilatation of the abdominal aorta. The IVC  appears normal. Dense atherosclerotic calcifications.      PERITONEUM/RETROPERITONEUM/LYMPH NODES:  There is no free or loculated fluid collection, no free  intraperitoneal air. The retroperitoneum appears normal.  No  abdominopelvic  lymphadenopathy is present.      BONES AND ABDOMINAL WALL:  No suspicious osseous lesions are identified. Degenerative discogenic  disease is noted in the lower thoracic and lumbar spine.  Small fat  containing inguinal hernias.       Impression:     1.  Appendicolith with appendiceal dilation, surrounding soft tissue  stranding and wall thickening compatible with acute appendicitis. No  drainable fluid collection or pneumoperitoneum.  2. Interval resection previously noted right renal mass with sequela  of fat necrosis and a 3.1 x 1.7 cm soft tissue attenuation lesion  within the nephrectomy bed. Although this lesion does not appear  aggressive or malignant, further evaluation with MRI of the kidney is  recommended.  3. Other stable findings as described above.          Signed by: Saad Lange 6/7/2024 9:02 PM  Dictation workstation:   [id number]                  Medical Problems         Problem List         * (Principal) Generalized abdominal pain     Abdominal pain, generalized     Primary hypertension     Hiatal hernia     NALLELY (obstructive sleep apnea)     Type 2 diabetes mellitus with hyperglycemia (Multi)     Acute appendicitis with localized peritonitis, without perforation or abscess                  Above medical problems may be reflective of historical medical problems that may have resolved and may not related to acute clinical condition/medical problems.     Clinical impression/plan:       Principal Problem:    Generalized abdominal pain  Active Problems:    Abdominal pain, generalized    Primary hypertension    Hiatal hernia    NALLELY (obstructive sleep apnea)    Type 2 diabetes mellitus with hyperglycemia (Multi)    Acute appendicitis with localized peritonitis, without perforation or abscess     1.  Acute appendicitis  -Operative day for open appendectomy complicated by extensive adhesions.  Appendix was found nonruptured.  Continues on Zosyn.     2.  HTN  -Significantly elevated on presentation  likely related to acute appendicitis pain.  -Remains moderately elevated postoperatively and as n.p.o. he is on IV metoprolol and IV hydralazine.  Normally takes metoprolol and spironolactone he describes.     3.  DM 2  -Apparently was at 1 time on Ozempic but it became too expensive.  He states now he is on glipizide and feels he is under good control.  Check hemoglobin A1c.  SSI/Accu-Cheks     4.  CKD  -Does not follow with nephrologist.  Last available creatinine in EMR 1.87 from August 2022.  -Continue to follow but appears likely roughly at baseline.     5.  DVT prophylaxis  -SCDs, otherwise per primary service.        Disposition/additional care plan/interventions:6/9/2024        Nasogastric tube removed with clear liquid diet ordered by general surgery.     Blood pressure elevated this a.m., patient nurse at the bedside plan to implement schedule antihypertensive therapy and rechecking of blood pressure and adjusting therapy as needed.     Monitor BP closely resume oral antihypertensive therapy, monitor hemodynamic status closely.     Continue IV as needed antihypertensive therapy.     Add spironolactone..................Given reported use by patient.  Verify home medications.     Blood glucose levels appeared improved from patient reported home baselines of 200 and above blood sugars .  Continue corrective insulin sliding scale and hypoglycemic protocol.     Monitor renal indices.     Stool softener per general surgery.     Monitor respiratory disposition closely with narcotics use, as needed Narcan.      Disposition/additional care plan/interventions:6/11/2024     Until oral antihypertensive therapy or okay by general surgery recommend implemented IV metoprolol substituted for daily beta-blocker use.  Given  Approximately 2. 5:1 ratio of p.o. to IV, estimated 50 mg po = 20 mg IV.  Escalate metoprolol for intravenous therapy frequency particular with patient refusal of IV hydralazine.     With n.p.o.  statusparticularly  if for a long duration recommend avoiding starvation ketosis, consider adding dextrose to maintain to maintenance IV.  Monitor potassium levels closely particular given renal disposition.     Continue insulin sliding scale.     Close outpatient follow-up with family physician recommended to continue optimization of blood pressure and blood glucose levels...................Patient counseled     In light of  NSAID use recommend close monitoring of renal function.Continue stewardship as per primary service.       The patient was informed of differential diagnosis , work up , plan of care and possible sequelae of clinical disposition.Patient in agreement with plan of care. Further recommendations forthcoming in accordance with patient's clinical disposition and response to care.     Discharge planning:Discharge timing in accordance with admitting service/general surgery.     Care time: < 55 mins              Dictation performed with assistance of voice recognition device therefore transcription errors are possible.

## 2024-06-11 NOTE — NURSING NOTE
Patient refusing PRN hydralazine at this time. Explained and educated patient on hydralazine. Patient stated hydralazine almost killed his kidneys years ago. Will continue to monitor.

## 2024-06-11 NOTE — PROGRESS NOTES
Kaveh Adkins is a 69 y.o. male on day 3 of admission presenting with Generalized abdominal pain.    Subjective   Reports passing gas overnight.  Denies CP/SOB/N/V.  Ambulating.         Objective   Heart Rate:  []   Temp:  [36.1 °C (97 °F)-36.8 °C (98.2 °F)]   Resp:  [6-36]   BP: (133-183)/()   SpO2:  [90 %-96 %]   I/O last 3 completed shifts:  In: 3204.6 (26.5 mL/kg) [I.V.:2764.6 (22.8 mL/kg); NG/GT:240; IV Piggyback:200]  Out: 2070 (17.1 mL/kg) [Urine:1150 (0.3 mL/kg/hr); Emesis/NG output:920]  Weight: 121.1 kg   No intake/output data recorded.  Physical Exam  Vitals reviewed.   Constitutional:       Comments: NGT with bile-tinged fluid in tubing   Cardiovascular:      Rate and Rhythm: Normal rate.   Pulmonary:      Effort: Pulmonary effort is normal.   Abdominal:      Palpations: Abdomen is soft.      Comments: Obese, distended, staples intact across wound   Skin:     General: Skin is warm.      Capillary Refill: Capillary refill takes less than 2 seconds.   Neurological:      General: No focal deficit present.      Mental Status: He is alert.   Psychiatric:         Mood and Affect: Mood normal.       Results for orders placed or performed during the hospital encounter of 06/07/24 (from the past 24 hour(s))   Basic Metabolic Panel   Result Value Ref Range    Glucose 142 (H) 74 - 99 mg/dL    Sodium 138 136 - 145 mmol/L    Potassium 3.7 3.5 - 5.3 mmol/L    Chloride 103 98 - 107 mmol/L    Bicarbonate 23 21 - 32 mmol/L    Anion Gap 16 10 - 20 mmol/L    Urea Nitrogen 24 (H) 6 - 23 mg/dL    Creatinine 1.53 (H) 0.50 - 1.30 mg/dL    eGFR 50 (L) >60 mL/min/1.73m*2    Calcium 9.2 8.6 - 10.3 mg/dL   POCT GLUCOSE   Result Value Ref Range    POCT Glucose 156 (H) 74 - 99 mg/dL   POCT GLUCOSE   Result Value Ref Range    POCT Glucose 150 (H) 74 - 99 mg/dL   POCT GLUCOSE   Result Value Ref Range    POCT Glucose 155 (H) 74 - 99 mg/dL   CBC and Auto Differential   Result Value Ref Range    WBC 8.1 4.4 - 11.3 x10*3/uL     nRBC 0.0 0.0 - 0.0 /100 WBCs    RBC 4.96 4.50 - 5.90 x10*6/uL    Hemoglobin 14.9 13.5 - 17.5 g/dL    Hematocrit 44.7 41.0 - 52.0 %    MCV 90 80 - 100 fL    MCH 30.0 26.0 - 34.0 pg    MCHC 33.3 32.0 - 36.0 g/dL    RDW 13.5 11.5 - 14.5 %    Platelets 131 (L) 150 - 450 x10*3/uL    Neutrophils % 77.9 40.0 - 80.0 %    Immature Granulocytes %, Automated 0.2 0.0 - 0.9 %    Lymphocytes % 9.9 13.0 - 44.0 %    Monocytes % 10.9 2.0 - 10.0 %    Eosinophils % 0.9 0.0 - 6.0 %    Basophils % 0.2 0.0 - 2.0 %    Neutrophils Absolute 6.31 1.20 - 7.70 x10*3/uL    Immature Granulocytes Absolute, Automated 0.02 0.00 - 0.70 x10*3/uL    Lymphocytes Absolute 0.80 (L) 1.20 - 4.80 x10*3/uL    Monocytes Absolute 0.88 0.10 - 1.00 x10*3/uL    Eosinophils Absolute 0.07 0.00 - 0.70 x10*3/uL    Basophils Absolute 0.02 0.00 - 0.10 x10*3/uL   POCT GLUCOSE   Result Value Ref Range    POCT Glucose 178 (H) 74 - 99 mg/dL   Basic Metabolic Panel   Result Value Ref Range    Glucose 168 (H) 74 - 99 mg/dL    Sodium 137 136 - 145 mmol/L    Potassium 3.5 3.5 - 5.3 mmol/L    Chloride 104 98 - 107 mmol/L    Bicarbonate 23 21 - 32 mmol/L    Anion Gap 14 10 - 20 mmol/L    Urea Nitrogen 28 (H) 6 - 23 mg/dL    Creatinine 1.50 (H) 0.50 - 1.30 mg/dL    eGFR 51 (L) >60 mL/min/1.73m*2    Calcium 8.9 8.6 - 10.3 mg/dL   Magnesium   Result Value Ref Range    Magnesium 2.02 1.60 - 2.40 mg/dL   Phosphorus   Result Value Ref Range    Phosphorus 2.5 2.5 - 4.9 mg/dL   POCT GLUCOSE   Result Value Ref Range    POCT Glucose 165 (H) 74 - 99 mg/dL           Assessment/Plan   Principal Problem:    Generalized abdominal pain  Active Problems:    Acute appendicitis with localized peritonitis, without perforation or abscess    Abdominal pain, generalized    Primary hypertension    Hiatal hernia    NALLELY (obstructive sleep apnea)    Type 2 diabetes mellitus with hyperglycemia (Multi)  69M POD3 s/p open appendectomy and extensive LILY with post-op ileus likely resolving with hypokalaemia  due to NGT/GI losses  - replete K; recheck BMP pm to monitor K and renal insufficiency  - 1L fluid bolus; increase mIVF to 125  - clamping trials for NGT for 24 hrs  - cont with IV lopressor for now  - transfer 3E with tele    I spent 45 minutes in the professional and overall care of this patient.      Misha Stafford MD

## 2024-06-11 NOTE — PROGRESS NOTES
Spiritual Care Visit    Clinical Encounter Type  Visited With: Patient and family together  Routine Visit: Introduction

## 2024-06-11 NOTE — CARE PLAN
Problem: Pain  Goal: My pain/discomfort is manageable  Outcome: Progressing  Flowsheets (Taken 6/11/2024 0656)  Resident's pain/discomfort is manageable: Administer pain medication prior to activities that may trigger pain  Note: Patient stated pain less than 3/10 throughout shift. Scheduled pain medications given. No PRN needed      Problem: Safety  Goal: I will remain free of falls  Outcome: Progressing   The patient's goals for the shift include less pain    The clinical goals for the shift include pt systolic bp < 160    Over the shift, the patient did not make progress toward the following goals. Barriers to progression include hypertension. Recommendations to address these barriers include restarted cardene.

## 2024-06-12 LAB
ANION GAP SERPL CALC-SCNC: 11 MMOL/L (ref 10–20)
APPEARANCE UR: CLEAR
BILIRUB UR STRIP.AUTO-MCNC: ABNORMAL MG/DL
BUN SERPL-MCNC: 27 MG/DL (ref 6–23)
CALCIUM SERPL-MCNC: 8 MG/DL (ref 8.6–10.3)
CHLORIDE SERPL-SCNC: 110 MMOL/L (ref 98–107)
CO2 SERPL-SCNC: 23 MMOL/L (ref 21–32)
COLOR UR: YELLOW
CREAT SERPL-MCNC: 1.24 MG/DL (ref 0.5–1.3)
EGFRCR SERPLBLD CKD-EPI 2021: 64 ML/MIN/1.73M*2
GLUCOSE BLD MANUAL STRIP-MCNC: 105 MG/DL (ref 74–99)
GLUCOSE BLD MANUAL STRIP-MCNC: 107 MG/DL (ref 74–99)
GLUCOSE BLD MANUAL STRIP-MCNC: 115 MG/DL (ref 74–99)
GLUCOSE BLD MANUAL STRIP-MCNC: 117 MG/DL (ref 74–99)
GLUCOSE BLD MANUAL STRIP-MCNC: 123 MG/DL (ref 74–99)
GLUCOSE BLD MANUAL STRIP-MCNC: 123 MG/DL (ref 74–99)
GLUCOSE SERPL-MCNC: 106 MG/DL (ref 74–99)
GLUCOSE UR STRIP.AUTO-MCNC: NORMAL MG/DL
KETONES UR STRIP.AUTO-MCNC: ABNORMAL MG/DL
LEUKOCYTE ESTERASE UR QL STRIP.AUTO: NEGATIVE
MAGNESIUM SERPL-MCNC: 1.86 MG/DL (ref 1.6–2.4)
MUCOUS THREADS #/AREA URNS AUTO: NORMAL /LPF
NITRITE UR QL STRIP.AUTO: NEGATIVE
PH UR STRIP.AUTO: 5.5 [PH]
PHOSPHATE SERPL-MCNC: 2.7 MG/DL (ref 2.5–4.9)
POTASSIUM SERPL-SCNC: 3.9 MMOL/L (ref 3.5–5.3)
PROT UR STRIP.AUTO-MCNC: ABNORMAL MG/DL
RBC # UR STRIP.AUTO: NEGATIVE /UL
RBC #/AREA URNS AUTO: NORMAL /HPF
SODIUM SERPL-SCNC: 140 MMOL/L (ref 136–145)
SP GR UR STRIP.AUTO: 1.03
UROBILINOGEN UR STRIP.AUTO-MCNC: ABNORMAL MG/DL
WBC #/AREA URNS AUTO: NORMAL /HPF

## 2024-06-12 PROCEDURE — 84100 ASSAY OF PHOSPHORUS: CPT | Performed by: SURGERY

## 2024-06-12 PROCEDURE — 99024 POSTOP FOLLOW-UP VISIT: CPT | Performed by: SURGERY

## 2024-06-12 PROCEDURE — 83735 ASSAY OF MAGNESIUM: CPT | Performed by: SURGERY

## 2024-06-12 PROCEDURE — 81001 URINALYSIS AUTO W/SCOPE: CPT | Performed by: HOSPITALIST

## 2024-06-12 PROCEDURE — 2500000001 HC RX 250 WO HCPCS SELF ADMINISTERED DRUGS (ALT 637 FOR MEDICARE OP)

## 2024-06-12 PROCEDURE — 2500000001 HC RX 250 WO HCPCS SELF ADMINISTERED DRUGS (ALT 637 FOR MEDICARE OP): Performed by: HOSPITALIST

## 2024-06-12 PROCEDURE — 99232 SBSQ HOSP IP/OBS MODERATE 35: CPT | Performed by: HOSPITALIST

## 2024-06-12 PROCEDURE — 2500000002 HC RX 250 W HCPCS SELF ADMINISTERED DRUGS (ALT 637 FOR MEDICARE OP, ALT 636 FOR OP/ED)

## 2024-06-12 PROCEDURE — 2500000004 HC RX 250 GENERAL PHARMACY W/ HCPCS (ALT 636 FOR OP/ED)

## 2024-06-12 PROCEDURE — 1200000002 HC GENERAL ROOM WITH TELEMETRY DAILY

## 2024-06-12 PROCEDURE — 82947 ASSAY GLUCOSE BLOOD QUANT: CPT

## 2024-06-12 PROCEDURE — 2500000001 HC RX 250 WO HCPCS SELF ADMINISTERED DRUGS (ALT 637 FOR MEDICARE OP): Performed by: SURGERY

## 2024-06-12 PROCEDURE — C9113 INJ PANTOPRAZOLE SODIUM, VIA: HCPCS | Performed by: SURGERY

## 2024-06-12 PROCEDURE — 2500000005 HC RX 250 GENERAL PHARMACY W/O HCPCS: Performed by: SURGERY

## 2024-06-12 PROCEDURE — 36415 COLL VENOUS BLD VENIPUNCTURE: CPT | Performed by: SURGERY

## 2024-06-12 PROCEDURE — 2500000002 HC RX 250 W HCPCS SELF ADMINISTERED DRUGS (ALT 637 FOR MEDICARE OP, ALT 636 FOR OP/ED): Performed by: HOSPITALIST

## 2024-06-12 PROCEDURE — 80048 BASIC METABOLIC PNL TOTAL CA: CPT | Performed by: SURGERY

## 2024-06-12 PROCEDURE — 2500000004 HC RX 250 GENERAL PHARMACY W/ HCPCS (ALT 636 FOR OP/ED): Performed by: SURGERY

## 2024-06-12 RX ORDER — HYDRALAZINE HYDROCHLORIDE 10 MG/1
10 TABLET, FILM COATED ORAL 3 TIMES DAILY
Status: DISCONTINUED | OUTPATIENT
Start: 2024-06-12 | End: 2024-06-13 | Stop reason: HOSPADM

## 2024-06-12 RX ORDER — TALC
6 POWDER (GRAM) TOPICAL DAILY
Status: DISCONTINUED | OUTPATIENT
Start: 2024-06-12 | End: 2024-06-13 | Stop reason: HOSPADM

## 2024-06-12 RX ORDER — MAGNESIUM SULFATE HEPTAHYDRATE 40 MG/ML
2 INJECTION, SOLUTION INTRAVENOUS ONCE
Status: COMPLETED | OUTPATIENT
Start: 2024-06-12 | End: 2024-06-12

## 2024-06-12 RX ORDER — OXYCODONE HYDROCHLORIDE 5 MG/1
5 TABLET ORAL EVERY 6 HOURS PRN
Status: DISCONTINUED | OUTPATIENT
Start: 2024-06-12 | End: 2024-06-12

## 2024-06-12 RX ORDER — KETOROLAC TROMETHAMINE 30 MG/ML
15 INJECTION, SOLUTION INTRAMUSCULAR; INTRAVENOUS EVERY 8 HOURS
Status: DISCONTINUED | OUTPATIENT
Start: 2024-06-12 | End: 2024-06-13 | Stop reason: HOSPADM

## 2024-06-12 RX ORDER — OXYCODONE HYDROCHLORIDE 5 MG/1
5 TABLET ORAL EVERY 6 HOURS PRN
Status: DISCONTINUED | OUTPATIENT
Start: 2024-06-12 | End: 2024-06-13 | Stop reason: HOSPADM

## 2024-06-12 RX ORDER — OXYCODONE HYDROCHLORIDE 10 MG/1
10 TABLET ORAL EVERY 6 HOURS PRN
Status: DISCONTINUED | OUTPATIENT
Start: 2024-06-12 | End: 2024-06-12

## 2024-06-12 RX ORDER — TRAZODONE HYDROCHLORIDE 50 MG/1
25 TABLET ORAL NIGHTLY
Status: DISCONTINUED | OUTPATIENT
Start: 2024-06-12 | End: 2024-06-13 | Stop reason: HOSPADM

## 2024-06-12 RX ADMIN — HYDRALAZINE HYDROCHLORIDE 10 MG: 10 TABLET ORAL at 20:58

## 2024-06-12 RX ADMIN — MAGNESIUM SULFATE HEPTAHYDRATE 2 G: 40 INJECTION, SOLUTION INTRAVENOUS at 11:25

## 2024-06-12 RX ADMIN — OXYCODONE HYDROCHLORIDE AND ACETAMINOPHEN 500 MG: 500 TABLET ORAL at 09:05

## 2024-06-12 RX ADMIN — DOCUSATE SODIUM 100 MG: 100 CAPSULE, LIQUID FILLED ORAL at 09:05

## 2024-06-12 RX ADMIN — METOPROLOL TARTRATE 50 MG: 50 TABLET, FILM COATED ORAL at 09:05

## 2024-06-12 RX ADMIN — TAMSULOSIN HYDROCHLORIDE 0.4 MG: 0.4 CAPSULE ORAL at 09:05

## 2024-06-12 RX ADMIN — Medication 1 TABLET: at 09:05

## 2024-06-12 RX ADMIN — KETOROLAC TROMETHAMINE 15 MG: 30 INJECTION, SOLUTION INTRAMUSCULAR at 20:58

## 2024-06-12 RX ADMIN — ROSUVASTATIN 20 MG: 20 TABLET, FILM COATED ORAL at 20:58

## 2024-06-12 RX ADMIN — CALCIUM CHLORIDE 1 G: 100 INJECTION INTRAVENOUS; INTRAVENTRICULAR at 14:09

## 2024-06-12 RX ADMIN — ENOXAPARIN SODIUM 40 MG: 40 INJECTION SUBCUTANEOUS at 09:05

## 2024-06-12 RX ADMIN — Medication 6 MG: at 20:59

## 2024-06-12 RX ADMIN — SPIRONOLACTONE 25 MG: 25 TABLET ORAL at 09:05

## 2024-06-12 RX ADMIN — DOCUSATE SODIUM 100 MG: 100 CAPSULE, LIQUID FILLED ORAL at 20:58

## 2024-06-12 RX ADMIN — PANTOPRAZOLE SODIUM 40 MG: 40 INJECTION, POWDER, FOR SOLUTION INTRAVENOUS at 09:05

## 2024-06-12 RX ADMIN — Medication 50 MG OF ELEMENTAL ZINC: at 09:05

## 2024-06-12 RX ADMIN — POLYETHYLENE GLYCOL 3350 17 G: 17 POWDER, FOR SOLUTION ORAL at 08:57

## 2024-06-12 RX ADMIN — METOPROLOL TARTRATE 75 MG: 50 TABLET, FILM COATED ORAL at 20:58

## 2024-06-12 RX ADMIN — KETOROLAC TROMETHAMINE 15 MG: 30 INJECTION, SOLUTION INTRAMUSCULAR at 11:25

## 2024-06-12 RX ADMIN — ACETAMINOPHEN 1000 MG: 10 INJECTION INTRAVENOUS at 08:57

## 2024-06-12 RX ADMIN — TRAZODONE HYDROCHLORIDE 25 MG: 50 TABLET ORAL at 20:58

## 2024-06-12 ASSESSMENT — COGNITIVE AND FUNCTIONAL STATUS - GENERAL
MOBILITY SCORE: 22
CLIMB 3 TO 5 STEPS WITH RAILING: A LITTLE
TOILETING: A LITTLE
WALKING IN HOSPITAL ROOM: A LITTLE
DAILY ACTIVITIY SCORE: 23

## 2024-06-12 ASSESSMENT — ENCOUNTER SYMPTOMS
NAUSEA: 0
ABDOMINAL PAIN: 1
SORE THROAT: 0
DYSURIA: 0
SPEECH DIFFICULTY: 0
LIGHT-HEADEDNESS: 0
WEAKNESS: 0
VOMITING: 0
DIZZINESS: 0
COUGH: 0
TROUBLE SWALLOWING: 0
COLOR CHANGE: 0
EYE DISCHARGE: 0
NECK PAIN: 0
CONSTIPATION: 0
BACK PAIN: 0
EYE REDNESS: 0
DIFFICULTY URINATING: 0
DIARRHEA: 0
ABDOMINAL DISTENTION: 0
PALPITATIONS: 0
CHILLS: 0
ARTHRALGIAS: 0
FEVER: 0
FREQUENCY: 0
SHORTNESS OF BREATH: 0

## 2024-06-12 ASSESSMENT — PAIN SCALES - GENERAL: PAINLEVEL_OUTOF10: 4

## 2024-06-12 ASSESSMENT — PAIN - FUNCTIONAL ASSESSMENT: PAIN_FUNCTIONAL_ASSESSMENT: 0-10

## 2024-06-12 ASSESSMENT — PAIN DESCRIPTION - DESCRIPTORS: DESCRIPTORS: ACHING

## 2024-06-12 NOTE — PROGRESS NOTES
Pt ex-wife, Carmella Adkins, called to received an update.  She stated pt just called her and said someone pulled the tube from his nose and there is a  in his room.  Explained to Carmella pt pulled out his NG tube along with his IV and has refused medication.  Pt also was verbally abusive to staff this shift.  Pt had to be redirected back to his room twice, he was wondering in the hallway looking for his family.

## 2024-06-12 NOTE — CARE PLAN
The patient's goals for the shift include to progress diet and ambulate.     The clinical goals for the shift include patient to remain safe and free from falls and injury, patient to tolerate clear liquid diet, and move bowels.

## 2024-06-12 NOTE — PROGRESS NOTES
Kaveh Adkins [id number]   Service: Internal Medicine / Hospitalist Date of service:6/12/2024                                  Full Code                     Kaveh Adkins is a 69 y.o. male presenting with Generalized abdominal pain.               Subjective      69-year-old male who apparently was awakened from sleep morning of 6/7 with right lower quadrant pain.  He had nausea but no vomiting.  This was sufficient that he sought medical care.  Upon presentation to the ED CT of the abdomen suggested acute appendicitis.  He was taken to the OR today and underwent appendectomy.  Apparently was a difficult procedure initially attempted laparoscopic but due to significant adhesions resulted in open procedure with about 3 hours of meticulous adhesion dissection.  Report of nonruptured appendix.  He is seen in the ICU postoperatively today and is awake alert and interactive appropriately.  His biggest complaint is persistent phlegm in his throat and difficulty expectorating due to abdominal discomfort trying to cough it up.  Expectorating clear mucus.  Blood pressure was 213/99 on presentation.  He does have history of hypertension and describes being on metoprolol and spironolactone as outpatient.  Here we are using metoprolol and hydralazine IV.  We are asked to see the patient for medical management.        6/9/......................Patient endorsed abdominal discomfort at the time of evaluation but stated that this is markedly improved after receiving analgesics just before my arrival.  No reported: Nausea, vomiting, flatus, bowel movement, chest pains or dyspnea.         6/10..........................Nasogastric tube back in place at time of evaluation this morning.  Patient reported overall he is doing fair.  No acute complaints voiced by patient.  Cardene drip implemented overnight.  No reported chest pains, headaches, fevers, chills or palpitations.     6/11................Patient seen and examined in ICU this  morning.  Patient reported overall is doing very well.  On arrival it appeared to the third floor this afternoon refused hydralazine reported to nurse that hydralazine wrecked his kidneys in the past.  Hydralazine was stopped.  No reported palpitations, headaches, chest pains, fevers, chills, nausea, vomiting or abdominal pain.    6/12................Reported confusion overnight.  No reported : seizure activities, cardiac dysrhythmia, nausea, vomiting, syncope, chest pains or dyspnea.     Review of Systems:   Review of system otherwise negative if not aforementioned above in subjective.     Objective        Physical Exam      Constitutional:       Appearance: Patient appeared in no acute cardiopulmonary distress.     Comments: Patient alert and oriented to person place time and situation.  HEENT:      Head: Normocephalic and atraumatic.Trachea midline      Nose:No observed congestion or rhinorrhea.     Mouth/Throat: Mucous membranes Moist, Trachea appeared  midline.  Eyes:      Extraocular Movements: Extraocular movements intact.      Pupils: Pupils are equal, round, and reactive to light.      Comments: No scleral icterus or conjunctival injection appreciated.   Cardiovascular:      Rate and Rhythm: Normal rate and regular rhythm. No clicks rubs or gallops, normal S1 and S2.No peripheral stigmata of endocarditis appreciated.     Pulmonary:      Lung bases could not be auscultated in light of abdominal binder, lung fields appeared clear and apices, no adventitious sound appreciated.  Abdominal:      General: Abdomen soft,  obese,hypoactive bowel sounds, no involuntary guarding or rebound tenderness appreciated.     Comments: Exam examination limited secondary to abdominal binder.  Musculoskeletal:       Patient appeared to have full active range of motion for upper and lower extremities, no acute apparent joint deformity appreciated on examination.   No pitting edema or cyanosis appreciated.       Lymphadenopathy:       No appreciable palpable lymphadenopathy  Skin:     General: Skin is warm.      Coloration:  No jaundice     Findings: No abnormal appearing skin rashes or lesions that appeared acute noted on unclothed area of the skin..   Neurological:      General: No focal sensory or motor deficits appreciated, no meningeal signs or dysmetria noted.      Cranial Nerves: Cranial nerves II to XII appearing grossly intact.     Genitals:  Deferred  Psychiatric:         The patient appears to be displaying normal mood and affect at the time of evaluation.     Labs:               Lab Results   Component Value Date            Lab Results   Component Value Date     GLUCOSE 168 (H) 06/11/2024     CALCIUM 8.9 06/11/2024      06/11/2024     K 3.5 06/11/2024     CO2 23 06/11/2024      06/11/2024     BUN 28 (H) 06/11/2024     CREATININE 1.50 (H) 06/11/2024            Lab Results   Component Value Date     WBC 8.1 06/11/2024     HGB 14.9 06/11/2024     HCT 44.7 06/11/2024     MCV 90 06/11/2024      (L) 06/11/2024         Lab Results   Component Value Date    GLUCOSE 106 (H) 06/12/2024    CALCIUM 8.0 (L) 06/12/2024     06/12/2024    K 3.9 06/12/2024    CO2 23 06/12/2024     (H) 06/12/2024    BUN 27 (H) 06/12/2024    CREATININE 1.24 06/12/2024         [unfilled]   [unfilled]   No results found for the last 90 days.                  X-rays/ Images     [unfilled]   XR chest 1 view [[id number]] Collected: 06/08/24 1615   Order Status: Completed Updated: 06/08/24 1615   Narrative:     Interpreted By:  Mohit Brady,  STUDY:  XR CHEST 1 VIEW; 6/8/2024 2:37 pm      INDICATION:  CLINICAL INFORMATION: Signs/Symptoms:NGT.      COMPARISON:  Abdominal CT 06/27/2024      ACCESSION NUMBER(S):  [id number]      ORDERING CLINICIAN:  RYLIE BAUTISTA      TECHNIQUE:  Portable chest one view.      FINDINGS:  The cardiac size is indeterminate in view of the AP projection.  Nasogastric tube overlies the  mediastinum extending into the upper  abdomen. No infiltrates or effusions are identified.  Left basilar  density is thought to be secondary to an epicardial fat pad based on  CT study from 06/07/2024.       Impression:     No acute pathologic findings are identified.  Nasogastric tube extends into the upper abdomen.      MACRO:  none      Signed by: Mohit Brady 6/8/2024 4:14 PM  Dictation workstation:   [id number]   XR abdomen 1 view [[id number]] Collected: 06/08/24 1618   Order Status: Completed Updated: 06/08/24 1618   Narrative:     Interpreted By:  Mohit Brady,  STUDY:  XR ABDOMEN 1 VIEW; 6/8/2024 2:37 pm      INDICATION:  Signs/Symptoms:NGT placement.      COMPARISON:  None.      ACCESSION NUMBER(S):  [id number]      ORDERING CLINICIAN:  RYLIE BAUTISTA      TECHNIQUE:  Upright portable KUB      FINDINGS:  Examination is limited due to the portable technique, patient body  habitus, and upright positioning of the patient. Nonetheless the  nasogastric tube extends into the epigastrium with the tip likely  lying within the body of the stomach. Left upper quadrant right upper  quadrant surgical clips are present. Bowel-gas pattern assessment is  limited by the technique. Nonetheless distended small bowel loops are  suspected within the midabdomen. Air and fecal material is suspected  to be present within the ascending colon and transverse colon. Pelvis  is omitted from the exam. Intraperitoneal free air is not identified  but sensitivity is likely diminished due to the technique.       Impression:     Nasogastric tube extends into the epigastrium.  Small bowel distention is present.      MACRO:  none      Signed by: Mohit Brady 6/8/2024 4:17 PM  Dictation workstation:   [id number]   CT abdomen pelvis w IV contrast [[id number]] Collected: 06/07/24 2103   Order Status: Completed Updated: 06/07/24 2103   Narrative:     Interpreted By:  Saad Lange,  STUDY:  CT ABDOMEN PELVIS W IV CONTRAST;   6/7/2024 8:32 pm      INDICATION:  Signs/Symptoms:abdominal pain.      COMPARISON:  CT abdomen and pelvis 08/25/2022      ACCESSION NUMBER(S):  [id number]      ORDERING CLINICIAN:  EARL ALSTON      TECHNIQUE:  CT of the abdomen and pelvis was performed.  Standard contiguous  axial images were obtained at 3 mm slice thickness through the  abdomen and pelvis. Coronal and sagittal reconstructions at 3 mm  slice thickness were performed.      100 ml of contrast Omnipaque 350 were administered intravenously  without immediate complication.      FINDINGS:  LOWER CHEST:  Bibasilar atelectasis.      ABDOMEN:      LIVER:  The liver is normal in size without evidence of focal liver lesions.      BILE DUCTS:  The intrahepatic and extrahepatic ducts are not dilated.      GALLBLADDER:  The gallbladder is surgically absent.      PANCREAS:  The pancreas appears unremarkable without evidence of ductal  dilatation or masses.      SPLEEN:  The spleen is normal in size without focal lesions.      ADRENAL GLANDS:  Bilateral adrenal glands appear normal.      KIDNEYS AND URETERS:  Interval resection of previously noted renal mass with cortical  scarring and fat necrosis within the region of the previously noted  mass. Additionally, there is a 3.1 x 1.7 cm soft tissue attenuation  lesion within the partial nephrectomy bed, possibly sequela of prior  treatment changes. There is mild surrounding soft tissue stranding.  Multiple left simple cysts the largest measures 8.9 cm. No  hydroureteronephrosis or nephroureterolithiasis is identified.      PELVIS:      BLADDER:  The urinary bladder appears normal without abnormal wall thickening.      REPRODUCTIVE ORGANS:  No pelvic masses.      BOWEL:  Small hiatal hernia. The stomach is otherwise unremarkable. No bowel  dilation or significant wall thickening. Moderate colonic stool.  Partial colectomy with anastomotic sutures in the left hemipelvis  appendicolith with distended appendix, measuring  up to 16 mm, with  wall thickening hyperemia and surrounding soft tissue stranding  compatible with acute appendicitis.      VESSELS:  There is no aneurysmal dilatation of the abdominal aorta. The IVC  appears normal. Dense atherosclerotic calcifications.      PERITONEUM/RETROPERITONEUM/LYMPH NODES:  There is no free or loculated fluid collection, no free  intraperitoneal air. The retroperitoneum appears normal.  No  abdominopelvic lymphadenopathy is present.      BONES AND ABDOMINAL WALL:  No suspicious osseous lesions are identified. Degenerative discogenic  disease is noted in the lower thoracic and lumbar spine.  Small fat  containing inguinal hernias.       Impression:     1.  Appendicolith with appendiceal dilation, surrounding soft tissue  stranding and wall thickening compatible with acute appendicitis. No  drainable fluid collection or pneumoperitoneum.  2. Interval resection previously noted right renal mass with sequela  of fat necrosis and a 3.1 x 1.7 cm soft tissue attenuation lesion  within the nephrectomy bed. Although this lesion does not appear  aggressive or malignant, further evaluation with MRI of the kidney is  recommended.  3. Other stable findings as described above.          Signed by: Saad Lange 6/7/2024 9:02 PM  Dictation workstation:   [id number]                  Medical Problems         Problem List         * (Principal) Generalized abdominal pain     Abdominal pain, generalized     Primary hypertension     Hiatal hernia     NALLELY (obstructive sleep apnea)     Type 2 diabetes mellitus with hyperglycemia (Multi)     Acute appendicitis with localized peritonitis, without perforation or abscess                  Above medical problems may be reflective of historical medical problems that may have resolved and may not related to acute clinical condition/medical problems.     Clinical impression/plan:       Principal Problem:    Generalized abdominal pain  Active Problems:    Abdominal  pain, generalized    Primary hypertension    Hiatal hernia    NALLELY (obstructive sleep apnea)    Type 2 diabetes mellitus with hyperglycemia (Multi)    Acute appendicitis with localized peritonitis, without perforation or abscess     1.  Acute appendicitis  -Operative day for open appendectomy complicated by extensive adhesions.  Appendix was found nonruptured.  Continues on Zosyn.     2.  HTN  -Significantly elevated on presentation likely related to acute appendicitis pain.  -Remains moderately elevated postoperatively and as n.p.o. he is on IV metoprolol and IV hydralazine.  Normally takes metoprolol and spironolactone he describes.     3.  DM 2  -Apparently was at 1 time on Ozempic but it became too expensive.  He states now he is on glipizide and feels he is under good control.  Check hemoglobin A1c.  SSI/Accu-Cheks     4.  CKD  -Does not follow with nephrologist.  Last available creatinine in EMR 1.87 from August 2022.  -Continue to follow but appears likely roughly at baseline.     5.  DVT prophylaxis  -SCDs, otherwise per primary service.        Disposition/additional care plan/interventions:6/9/2024        Nasogastric tube removed with clear liquid diet ordered by general surgery.     Blood pressure elevated this a.m., patient nurse at the bedside plan to implement schedule antihypertensive therapy and rechecking of blood pressure and adjusting therapy as needed.     Monitor BP closely resume oral antihypertensive therapy, monitor hemodynamic status closely.     Continue IV as needed antihypertensive therapy.     Add spironolactone..................Given reported use by patient.  Verify home medications.     Blood glucose levels appeared improved from patient reported home baselines of 200 and above blood sugars .  Continue corrective insulin sliding scale and hypoglycemic protocol.     Monitor renal indices.     Stool softener per general surgery.     Monitor respiratory disposition closely with narcotics  use, as needed Narcan.      Disposition/additional care plan/interventions:6/11/2024     Until oral antihypertensive therapy or okay by general surgery recommend implemented IV metoprolol substituted for daily beta-blocker use.  Given  Approximately 2. 5:1 ratio of p.o. to IV, estimated 50 mg po = 20 mg IV.  Escalate metoprolol for intravenous therapy frequency particular with patient refusal of IV hydralazine.     With n.p.o. status particularly  if for a long duration recommend avoiding starvation ketosis, consider adding dextrose to maintain to maintenance IV.  Monitor potassium levels closely particular given renal disposition.     Continue insulin sliding scale.     Close outpatient follow-up with family physician recommended to continue optimization of blood pressure and blood glucose levels...................Patient counseled     In light of  NSAID use recommend close monitoring of renal function.Continue stewardship as per primary service.      Disposition/additional care plan/interventions:6/12/2024        Blood pressure still elevated.  Given patient's preference of antihypertensive therapy.  Somewhat limited but options are.  Unclear if patient ever had history of ACE inhibitor/ARB intolerant.  Will escalate Lopressor to 75 mg twice daily.    Patient at the time of examination appeared to be able to follow stream of thought without difficulty. Suspicion of postoperative delirium/perioperative neurocognitive disorder.    Check UA for completeness................. given reported confusion overnight    Monitor lidocaine patch use......................iatrogenic related confusion    The patient was informed of differential diagnosis , work up , plan of care and possible sequelae of clinical disposition.Patient in agreement with plan of care. Further recommendations forthcoming in accordance with patient's clinical disposition and response to care.     Discharge planning:Discharge timing in accordance with  admitting service/general surgery.     Care time: < 55 mins      Addendum: Verification of statement by patient concerning hydralazine wrecking his kidneys.  Patient clarified that it is actually hydrochlorothiazide not hydralazine.        Dictation performed with assistance of voice recognition device therefore transcription errors are possible.

## 2024-06-12 NOTE — NURSING NOTE
Patient refusing telemetry monitoring and continuous fluids at this time. Patient sitting in chair, pleasant and cooperative at this time.

## 2024-06-12 NOTE — PROGRESS NOTES
GENERAL SURGERY PROGRESS NOTE    Kaveh Adkins   1955   [id number]     Kaveh Adkins is a 69 y.o. male on day 4 of admission presenting with Generalized abdominal pain.      Subjective  Pt YAN at bedside. Was confused overnight, pulled out IV and NGT. Passing flatus, no bowel movements yet. Pain improving. No nausea.    Review of Systems   Constitutional:  Negative for chills and fever.   HENT:  Negative for congestion, sore throat and trouble swallowing.    Eyes:  Negative for discharge and redness.   Respiratory:  Negative for cough and shortness of breath.    Cardiovascular:  Negative for chest pain and palpitations.   Gastrointestinal:  Positive for abdominal pain. Negative for abdominal distention, constipation, diarrhea, nausea and vomiting.   Endocrine: Negative for cold intolerance and heat intolerance.   Genitourinary:  Negative for difficulty urinating, dysuria, frequency and urgency.   Musculoskeletal:  Negative for arthralgias, back pain and neck pain.   Skin:  Negative for color change and rash.   Neurological:  Negative for dizziness, speech difficulty, weakness and light-headedness.       Objective    Last Recorded Vitals  Blood pressure 169/87, pulse 70, temperature 36.9 °C (98.4 °F), temperature source Temporal, resp. rate 18, height 1.829 m (6'), weight 114 kg (251 lb 6.4 oz), SpO2 94%.    Intake/Output last 3 Shifts:  I/O last 3 completed shifts:  In: 3586.3 (31.4 mL/kg) [I.V.:3466.3 (30.4 mL/kg); NG/GT:120]  Out: 755 (6.6 mL/kg) [Urine:525 (0.1 mL/kg/hr); Emesis/NG output:230]  Weight: 114 kg     Gen: NAD.  A&Ox3  HEENT: NC/AT.  Moist mucous membranes.  Neck: Normal range of motion.  CV: Regular rate.  Chest: Normal chest rise.  Normal respiratory effort.  Abdomen: Soft.  Minimally TTP artie-incisionally, non-distended.  No rigidity or guarding. Incision dry, intact.  Extremities: No edema.  Moving all extremities.    Relevant Results  Results for orders placed or performed during the  hospital encounter of 06/07/24 (from the past 24 hour(s))   Basic Metabolic Panel   Result Value Ref Range    Glucose 110 (H) 74 - 99 mg/dL    Sodium 139 136 - 145 mmol/L    Potassium 4.8 3.5 - 5.3 mmol/L    Chloride 106 98 - 107 mmol/L    Bicarbonate 24 21 - 32 mmol/L    Anion Gap 14 10 - 20 mmol/L    Urea Nitrogen 30 (H) 6 - 23 mg/dL    Creatinine 1.40 (H) 0.50 - 1.30 mg/dL    eGFR 55 (L) >60 mL/min/1.73m*2    Calcium 8.8 8.6 - 10.3 mg/dL   POCT GLUCOSE   Result Value Ref Range    POCT Glucose 112 (H) 74 - 99 mg/dL   POCT GLUCOSE   Result Value Ref Range    POCT Glucose 104 (H) 74 - 99 mg/dL   POCT GLUCOSE   Result Value Ref Range    POCT Glucose 115 (H) 74 - 99 mg/dL   Basic Metabolic Panel   Result Value Ref Range    Glucose 106 (H) 74 - 99 mg/dL    Sodium 140 136 - 145 mmol/L    Potassium 3.9 3.5 - 5.3 mmol/L    Chloride 110 (H) 98 - 107 mmol/L    Bicarbonate 23 21 - 32 mmol/L    Anion Gap 11 10 - 20 mmol/L    Urea Nitrogen 27 (H) 6 - 23 mg/dL    Creatinine 1.24 0.50 - 1.30 mg/dL    eGFR 64 >60 mL/min/1.73m*2    Calcium 8.0 (L) 8.6 - 10.3 mg/dL   Magnesium   Result Value Ref Range    Magnesium 1.86 1.60 - 2.40 mg/dL   Phosphorus   Result Value Ref Range    Phosphorus 2.7 2.5 - 4.9 mg/dL   POCT GLUCOSE   Result Value Ref Range    POCT Glucose 117 (H) 74 - 99 mg/dL       XR abdomen 2 views supine and erect or decub    Result Date: 6/11/2024  Interpreted By:  Jayden Sharma, STUDY: XR ABDOMEN 2 VIEWS SUPINE AND ERECT OR DECUB;  6/11/2024 11:02 am   INDICATION: Signs/Symptoms:Eval for progresion of ileus, POD3 s/p open appendectomy.   COMPARISON: 06/10/2024   ACCESSION NUMBER(S): [id number]   ORDERING CLINICIAN: RYLIE BAUTISTA   TECHNIQUE: Abdomen supine and upright views   FINDINGS:     ABDOMEN:   There is moderate distention of large and small bowel segments, mildly increased from the previous examination. This is probably due to a postoperative ileus, but obstruction is possible. Follow-up as clinically  warranted. No evidence of free intraperitoneal air.   Osseous structures demonstrate no acute bony abnormalities.   CHEST: The lung bases as visualized appear clear.     An NG catheter extends to the fundus of the stomach.       1.  Probable postoperative ileus, with slight increased distention. 2.  No evidence of acute cardiopulmonary process.     MACRO: None   Signed by: Jayden Sharma 6/11/2024 12:36 PM Dictation workstation:   [id number]    ECG 12 Lead    Result Date: 6/10/2024  Sinus rhythm Prolonged NJ interval Incomplete RBBB and LAFB Abnormal R-wave progression, late transition Left ventricular hypertrophy    XR abdomen 1 view    Result Date: 6/10/2024  Interpreted By:  Elisha Graham, STUDY: XR ABDOMEN 1 VIEW;  6/10/2024 9:08 am   INDICATION: Signs/Symptoms:NG tube placement.   COMPARISON: 06/10/2024 at 6:09 a.m.   ACCESSION NUMBER(S): [id number]   ORDERING CLINICIAN: RYLIE BAUTISTA   FINDINGS: NG tube tip projects within the region of the gastric fundus. Side-hole is close to the EG junction in the region of the gastric cardia. Small-bowel gaseous dilatation is similar to the earlier study. Gastric air appears decreased. Patchy colon air is noted. Abdominal midline skin staples are present. Limited evaluation of pneumoperitoneum on supine imaging, however no gross evidence of free air is noted.   Visualized lungs are clear.   Osseous structures demonstrate no acute bony changes.       1.  NG tube is been inserted with the tip in the region of the fundus of the stomach and the side hole close to the junction in the region of the gastric cardia 2. Decreased gastric air is noted from prior study but colonic and small bowel air in ileus pattern is unchanged.   MACRO: None   Signed by: Elisha Graham 6/10/2024 9:31 AM Dictation workstation:   [id number]    XR abdomen 1 view    Result Date: 6/10/2024  Interpreted By:  Elisha Graham, STUDY: XR ABDOMEN 1 VIEW;  6/10/2024 6:15 am   INDICATION:  Signs/Symptoms:eval for gastric distension.   COMPARISON: 06/08/2024   ACCESSION NUMBER(S): [id number]   ORDERING CLINICIAN: RYLIE BAUTISTA   FINDINGS: Midline skin staples are noted in the infraumbilical region. Air is noted in nondilated stomach. Air-filled dilated small bowel and air-filled mildly dilated proximal colon are noted. Although the small bowel dilatation appears slightly worse than the prior study, the distribution of air is more diffuse more likely a postoperative ileus. Limited evaluation of pneumoperitoneum on supine imaging, however no gross evidence of free air is noted.   The minimal visualized lung bases are grossly clear.   Osseous structures demonstrate no acute bony changes. Degenerative changes are noted in spine and hips.       1.  Although small bowel gas appears increased in dilatation appears increased, there is also gastric and colonic air in the pattern currently is more consistent with a postoperative ileus than obstruction. Although there is air in the stomach, the stomach is not dilated.   MACRO: None   Signed by: Elisha Pretorius 6/10/2024 8:09 AM Dictation workstation:   [id number]    XR abdomen 1 view    Result Date: 6/8/2024  Interpreted By:  Mohit Brady, STUDY: XR ABDOMEN 1 VIEW; 6/8/2024 2:37 pm   INDICATION: Signs/Symptoms:NGT placement.   COMPARISON: None.   ACCESSION NUMBER(S): [id number]   ORDERING CLINICIAN: RYLIE BAUTISTA   TECHNIQUE: Upright portable KUB   FINDINGS: Examination is limited due to the portable technique, patient body habitus, and upright positioning of the patient. Nonetheless the nasogastric tube extends into the epigastrium with the tip likely lying within the body of the stomach. Left upper quadrant right upper quadrant surgical clips are present. Bowel-gas pattern assessment is limited by the technique. Nonetheless distended small bowel loops are suspected within the midabdomen. Air and fecal material is suspected to be present  within the ascending colon and transverse colon. Pelvis is omitted from the exam. Intraperitoneal free air is not identified but sensitivity is likely diminished due to the technique.       Nasogastric tube extends into the epigastrium. Small bowel distention is present.   MACRO: none   Signed by: Mohit Brady 6/8/2024 4:17 PM Dictation workstation:   [id number]    XR chest 1 view    Result Date: 6/8/2024  Interpreted By:  Mohit Brady, STUDY: XR CHEST 1 VIEW; 6/8/2024 2:37 pm   INDICATION: CLINICAL INFORMATION: Signs/Symptoms:NGT.   COMPARISON: Abdominal CT 06/27/2024   ACCESSION NUMBER(S): [id number]   ORDERING CLINICIAN: RYLIE BAUTISTA   TECHNIQUE: Portable chest one view.   FINDINGS: The cardiac size is indeterminate in view of the AP projection. Nasogastric tube overlies the mediastinum extending into the upper abdomen. No infiltrates or effusions are identified.  Left basilar density is thought to be secondary to an epicardial fat pad based on CT study from 06/07/2024.       No acute pathologic findings are identified. Nasogastric tube extends into the upper abdomen.   MACRO: none   Signed by: Mohit Brady 6/8/2024 4:14 PM Dictation workstation:   [id number]    CT abdomen pelvis w IV contrast    Result Date: 6/7/2024  Interpreted By:  Saad Lange, STUDY: CT ABDOMEN PELVIS W IV CONTRAST;  6/7/2024 8:32 pm   INDICATION: Signs/Symptoms:abdominal pain.   COMPARISON: CT abdomen and pelvis 08/25/2022   ACCESSION NUMBER(S): [id number]   ORDERING CLINICIAN: EARL ALSTON   TECHNIQUE: CT of the abdomen and pelvis was performed.  Standard contiguous axial images were obtained at 3 mm slice thickness through the abdomen and pelvis. Coronal and sagittal reconstructions at 3 mm slice thickness were performed.   100 ml of contrast Omnipaque 350 were administered intravenously without immediate complication.   FINDINGS: LOWER CHEST: Bibasilar atelectasis.   ABDOMEN:   LIVER: The liver is normal in size  without evidence of focal liver lesions.   BILE DUCTS: The intrahepatic and extrahepatic ducts are not dilated.   GALLBLADDER: The gallbladder is surgically absent.   PANCREAS: The pancreas appears unremarkable without evidence of ductal dilatation or masses.   SPLEEN: The spleen is normal in size without focal lesions.   ADRENAL GLANDS: Bilateral adrenal glands appear normal.   KIDNEYS AND URETERS: Interval resection of previously noted renal mass with cortical scarring and fat necrosis within the region of the previously noted mass. Additionally, there is a 3.1 x 1.7 cm soft tissue attenuation lesion within the partial nephrectomy bed, possibly sequela of prior treatment changes. There is mild surrounding soft tissue stranding. Multiple left simple cysts the largest measures 8.9 cm. No hydroureteronephrosis or nephroureterolithiasis is identified.   PELVIS:   BLADDER: The urinary bladder appears normal without abnormal wall thickening.   REPRODUCTIVE ORGANS: No pelvic masses.   BOWEL: Small hiatal hernia. The stomach is otherwise unremarkable. No bowel dilation or significant wall thickening. Moderate colonic stool. Partial colectomy with anastomotic sutures in the left hemipelvis appendicolith with distended appendix, measuring up to 16 mm, with wall thickening hyperemia and surrounding soft tissue stranding compatible with acute appendicitis.   VESSELS: There is no aneurysmal dilatation of the abdominal aorta. The IVC appears normal. Dense atherosclerotic calcifications.   PERITONEUM/RETROPERITONEUM/LYMPH NODES: There is no free or loculated fluid collection, no free intraperitoneal air. The retroperitoneum appears normal.  No abdominopelvic lymphadenopathy is present.   BONES AND ABDOMINAL WALL: No suspicious osseous lesions are identified. Degenerative discogenic disease is noted in the lower thoracic and lumbar spine.  Small fat containing inguinal hernias.       1.  Appendicolith with appendiceal dilation,  surrounding soft tissue stranding and wall thickening compatible with acute appendicitis. No drainable fluid collection or pneumoperitoneum. 2. Interval resection previously noted right renal mass with sequela of fat necrosis and a 3.1 x 1.7 cm soft tissue attenuation lesion within the nephrectomy bed. Although this lesion does not appear aggressive or malignant, further evaluation with MRI of the kidney is recommended. 3. Other stable findings as described above.     Signed by: Saad Lange 6/7/2024 9:02 PM Dictation workstation:   [id number]      Assessment and Plan  Principal Problem:    Generalized abdominal pain  Active Problems:    Abdominal pain, generalized    Primary hypertension    Hiatal hernia    NALLELY (obstructive sleep apnea)    Type 2 diabetes mellitus with hyperglycemia (Multi)    Acute appendicitis with localized peritonitis, without perforation or abscess    69 y.o. male with acute appendicitis s/p open appendectomy with extensive LILY. Post op ileus, resolving    - Pt removed NGT, will start CLD as he is passing copious flatus and abdominal exam has improved  - converted IV home and pain meds to PO  - PRN suppository  - await return of bowel function  - maintain IVF until tolerating diet  - ambulation, incentive spirometry    Discussed with Dr. Rivera Loomis, DO  PGY2  General Surgery

## 2024-06-12 NOTE — CARE PLAN
The patient's goals for the shift include     Problem: Pain  Goal: My pain/discomfort is manageable  Outcome: Progressing     Problem: Safety  Goal: Patient will be injury free during hospitalization  Outcome: Progressing  Goal: I will remain free of falls  Outcome: Progressing     Problem: Daily Care  Goal: Daily care needs are met  Outcome: Progressing     Problem: Psychosocial Needs  Goal: Demonstrates ability to cope with hospitalization/illness  Outcome: Progressing  Goal: Collaborate with me, my family, and caregiver to identify my specific goals  Outcome: Progressing     Problem: Pain - Adult  Goal: Verbalizes/displays adequate comfort level or baseline comfort level  Outcome: Progressing     Problem: Safety - Adult  Goal: Free from fall injury  Outcome: Progressing     Problem: Discharge Planning  Goal: Discharge to home or other facility with appropriate resources  Outcome: Progressing     Problem: Chronic Conditions and Co-morbidities  Goal: Patient's chronic conditions and co-morbidity symptoms are monitored and maintained or improved  Outcome: Progressing     Problem: Diabetes  Goal: Maintain glucose levels >70mg/dl to <250mg/dl throughout shift  Outcome: Progressing  Goal: No changes in neurological exam by end of shift  Outcome: Progressing  Goal: Vital signs within normal range for age by end of shift  Outcome: Progressing  Goal: Increase self care and/or family involovement by end of shift  Outcome: Progressing     Problem: Pain  Goal: Takes deep breaths with improved pain control throughout the shift  Outcome: Progressing  Goal: Turns in bed with improved pain control throughout the shift  Outcome: Progressing  Goal: Walks with improved pain control throughout the shift  Outcome: Progressing  Goal: Performs ADL's with improved pain control throughout shift  Outcome: Progressing  Goal: Free from opioid side effects throughout the shift  Outcome: Progressing  Goal: Free from acute confusion related to  pain meds throughout the shift  Outcome: Progressing     Problem: Skin  Goal: Participates in plan/prevention/treatment measures  Outcome: Progressing  Flowsheets (Taken 6/12/2024 0833)  Participates in plan/prevention/treatment measures:   Elevate heels   Discuss with provider PT/OT consult  Goal: Prevent/manage excess moisture  Outcome: Progressing  Flowsheets (Taken 6/12/2024 0833)  Prevent/manage excess moisture:   Monitor for/manage infection if present   Cleanse incontinence/protect with barrier cream  Goal: Prevent/minimize sheer/friction injuries  Outcome: Progressing  Flowsheets (Taken 6/12/2024 0833)  Prevent/minimize sheer/friction injuries:   Complete micro-shifts as needed if patient unable. Adjust patient position to relieve pressure points, not a full turn   Turn/reposition every 2 hours/use positioning/transfer devices   Increase activity/out of bed for meals  Goal: Promote/optimize nutrition  Outcome: Progressing  Flowsheets (Taken 6/12/2024 0833)  Promote/optimize nutrition: Monitor/record intake including meals  Goal: Promote skin healing  Outcome: Progressing  Flowsheets (Taken 6/12/2024 0833)  Promote skin healing:   Assess skin/pad under line(s)/device(s)   Protective dressings over bony prominences     Problem: Nutrition  Goal: Less than 5 days NPO/clear liquids  Outcome: Progressing  Goal: BG  mg/dL  Outcome: Progressing  Goal: Promote healing  Outcome: Progressing  Goal: Maintain stable weight  Outcome: Progressing     Problem: Fall/Injury  Goal: Not fall by end of shift  Outcome: Progressing  Goal: Be free from injury by end of the shift  Outcome: Progressing  Goal: Verbalize understanding of personal risk factors for fall in the hospital  Outcome: Progressing  Goal: Verbalize understanding of risk factor reduction measures to prevent injury from fall in the home  Outcome: Progressing  Goal: Use assistive devices by end of the shift  Outcome: Progressing  Goal: Pace activities to  prevent fatigue by end of the shift  Outcome: Progressing

## 2024-06-12 NOTE — NURSING NOTE
Patient very upset about chair alarm and bed alarm, patient refusing both, does not want any alarms on.

## 2024-06-12 NOTE — PROGRESS NOTES
Patient is improving. NGT is out and patient has started on a diet. Surgery anticipates discharge in the next 24 hours pending diet tolerance. Plan remains for patient to return home at discharge.

## 2024-06-12 NOTE — PROGRESS NOTES
PT has been noncompliant this shift.  Pt pulled out his NG tube, IV in the RFA, refuses to wear the heart monitor, and has refused medication.  Dr. Loomis made aware.

## 2024-06-12 NOTE — CARE PLAN
"The patient's goals for the shift include \"get the tube out of my nose\"    The clinical goals for the shift include pt will tolerate NPO status throughout oshift5    Over the shift, the patient did not make progress toward the following goals.   "

## 2024-06-13 ENCOUNTER — PHARMACY VISIT (OUTPATIENT)
Dept: PHARMACY | Facility: CLINIC | Age: 69
End: 2024-06-13
Payer: COMMERCIAL

## 2024-06-13 VITALS
OXYGEN SATURATION: 96 % | HEART RATE: 60 BPM | HEIGHT: 72 IN | BODY MASS INDEX: 34.05 KG/M2 | WEIGHT: 251.4 LBS | TEMPERATURE: 96.8 F | DIASTOLIC BLOOD PRESSURE: 73 MMHG | RESPIRATION RATE: 17 BRPM | SYSTOLIC BLOOD PRESSURE: 162 MMHG

## 2024-06-13 LAB
ANION GAP SERPL CALC-SCNC: 12 MMOL/L (ref 10–20)
BUN SERPL-MCNC: 25 MG/DL (ref 6–23)
CALCIUM SERPL-MCNC: 7.2 MG/DL (ref 8.6–10.3)
CHLORIDE SERPL-SCNC: 110 MMOL/L (ref 98–107)
CO2 SERPL-SCNC: 23 MMOL/L (ref 21–32)
CREAT SERPL-MCNC: 1.18 MG/DL (ref 0.5–1.3)
EGFRCR SERPLBLD CKD-EPI 2021: 68 ML/MIN/1.73M*2
GLUCOSE BLD MANUAL STRIP-MCNC: 149 MG/DL (ref 74–99)
GLUCOSE BLD MANUAL STRIP-MCNC: 93 MG/DL (ref 74–99)
GLUCOSE BLD MANUAL STRIP-MCNC: 98 MG/DL (ref 74–99)
GLUCOSE SERPL-MCNC: 88 MG/DL (ref 74–99)
POTASSIUM SERPL-SCNC: 3.6 MMOL/L (ref 3.5–5.3)
SODIUM SERPL-SCNC: 141 MMOL/L (ref 136–145)

## 2024-06-13 PROCEDURE — 36415 COLL VENOUS BLD VENIPUNCTURE: CPT

## 2024-06-13 PROCEDURE — 2500000002 HC RX 250 W HCPCS SELF ADMINISTERED DRUGS (ALT 637 FOR MEDICARE OP, ALT 636 FOR OP/ED): Performed by: HOSPITALIST

## 2024-06-13 PROCEDURE — 2500000002 HC RX 250 W HCPCS SELF ADMINISTERED DRUGS (ALT 637 FOR MEDICARE OP, ALT 636 FOR OP/ED): Mod: MUE

## 2024-06-13 PROCEDURE — RXMED WILLOW AMBULATORY MEDICATION CHARGE

## 2024-06-13 PROCEDURE — 2500000001 HC RX 250 WO HCPCS SELF ADMINISTERED DRUGS (ALT 637 FOR MEDICARE OP): Performed by: HOSPITALIST

## 2024-06-13 PROCEDURE — 82947 ASSAY GLUCOSE BLOOD QUANT: CPT | Mod: 91

## 2024-06-13 PROCEDURE — 80048 BASIC METABOLIC PNL TOTAL CA: CPT

## 2024-06-13 PROCEDURE — 2500000004 HC RX 250 GENERAL PHARMACY W/ HCPCS (ALT 636 FOR OP/ED): Performed by: SURGERY

## 2024-06-13 PROCEDURE — 99024 POSTOP FOLLOW-UP VISIT: CPT | Performed by: SURGERY

## 2024-06-13 PROCEDURE — 2500000001 HC RX 250 WO HCPCS SELF ADMINISTERED DRUGS (ALT 637 FOR MEDICARE OP)

## 2024-06-13 RX ORDER — METOPROLOL TARTRATE 50 MG/1
75 TABLET ORAL 2 TIMES DAILY
Qty: 90 TABLET | Refills: 0 | Status: SHIPPED | OUTPATIENT
Start: 2024-06-13

## 2024-06-13 RX ORDER — OXYCODONE HYDROCHLORIDE 5 MG/1
5 TABLET ORAL EVERY 6 HOURS PRN
Qty: 15 TABLET | Refills: 0 | Status: SHIPPED | OUTPATIENT
Start: 2024-06-13

## 2024-06-13 RX ORDER — LIDOCAINE 560 MG/1
2 PATCH PERCUTANEOUS; TOPICAL; TRANSDERMAL DAILY
Qty: 10 PATCH | Refills: 0 | Status: SHIPPED | OUTPATIENT
Start: 2024-06-14 | End: 2024-06-19

## 2024-06-13 RX ORDER — DOCUSATE SODIUM 100 MG/1
100 CAPSULE, LIQUID FILLED ORAL 2 TIMES DAILY
Qty: 60 CAPSULE | Refills: 0 | Status: SHIPPED | OUTPATIENT
Start: 2024-06-13 | End: 2024-07-13

## 2024-06-13 RX ORDER — ACETAMINOPHEN 325 MG/1
650 TABLET ORAL EVERY 6 HOURS PRN
Qty: 30 TABLET | Refills: 0 | Status: SHIPPED | OUTPATIENT
Start: 2024-06-13

## 2024-06-13 RX ORDER — HYDRALAZINE HYDROCHLORIDE 10 MG/1
10 TABLET, FILM COATED ORAL 3 TIMES DAILY
Qty: 90 TABLET | Refills: 0 | Status: SHIPPED | OUTPATIENT
Start: 2024-06-13 | End: 2024-07-13

## 2024-06-13 RX ADMIN — Medication 1 TABLET: at 08:02

## 2024-06-13 RX ADMIN — METOPROLOL TARTRATE 75 MG: 50 TABLET, FILM COATED ORAL at 08:02

## 2024-06-13 RX ADMIN — SPIRONOLACTONE 25 MG: 25 TABLET ORAL at 08:02

## 2024-06-13 RX ADMIN — HYDRALAZINE HYDROCHLORIDE 10 MG: 10 TABLET ORAL at 08:02

## 2024-06-13 RX ADMIN — OXYCODONE HYDROCHLORIDE AND ACETAMINOPHEN 500 MG: 500 TABLET ORAL at 08:02

## 2024-06-13 RX ADMIN — DOCUSATE SODIUM 100 MG: 100 CAPSULE, LIQUID FILLED ORAL at 08:02

## 2024-06-13 RX ADMIN — Medication 50 MG OF ELEMENTAL ZINC: at 08:02

## 2024-06-13 RX ADMIN — ENOXAPARIN SODIUM 40 MG: 40 INJECTION SUBCUTANEOUS at 08:03

## 2024-06-13 RX ADMIN — TAMSULOSIN HYDROCHLORIDE 0.4 MG: 0.4 CAPSULE ORAL at 08:02

## 2024-06-13 ASSESSMENT — COGNITIVE AND FUNCTIONAL STATUS - GENERAL
MOVING FROM LYING ON BACK TO SITTING ON SIDE OF FLAT BED WITH BEDRAILS: A LITTLE
DAILY ACTIVITIY SCORE: 21
MOBILITY SCORE: 18
TOILETING: A LITTLE
DRESSING REGULAR LOWER BODY CLOTHING: A LITTLE
CLIMB 3 TO 5 STEPS WITH RAILING: A LITTLE
HELP NEEDED FOR BATHING: A LITTLE
STANDING UP FROM CHAIR USING ARMS: A LITTLE
MOVING TO AND FROM BED TO CHAIR: A LITTLE
TURNING FROM BACK TO SIDE WHILE IN FLAT BAD: A LITTLE
WALKING IN HOSPITAL ROOM: A LITTLE

## 2024-06-13 ASSESSMENT — PAIN SCALES - GENERAL: PAINLEVEL_OUTOF10: 0 - NO PAIN

## 2024-06-13 ASSESSMENT — PAIN - FUNCTIONAL ASSESSMENT: PAIN_FUNCTIONAL_ASSESSMENT: 0-10

## 2024-06-13 NOTE — NURSING NOTE
Pt being discharged home. Discharge instructions reviewed with the patient and family regarding new/continued medications and follow up appointments. Medications at bedside via Med to Beds. All questions answered at this time. All belongings to go with the patient. IV removed per policy with tip of catheter intact. Provided patient with extra abdominal binder and ABD pads.

## 2024-06-13 NOTE — PROGRESS NOTES
Kaveh Adkins [id number]   Service: Internal Medicine / Hospitalist Date of attempted service:6/13/2024                                  Full Code         Disposition: Patient discharged home prior to hospitalist rounds by admitting service/surgical service.           Narrative:                             Medical Problems         Problem List         * (Principal) Generalized abdominal pain     Abdominal pain, generalized     Primary hypertension     Hiatal hernia     NALLELY (obstructive sleep apnea)     Type 2 diabetes mellitus with hyperglycemia (Multi)     Acute appendicitis with localized peritonitis, without perforation or abscess                  Above medical problems may be reflective of historical medical problems that may have resolved and may not related to acute clinical condition/medical problems.     Clinical impression/plan:       Principal Problem:    Generalized abdominal pain  Active Problems:    Abdominal pain, generalized    Primary hypertension    Hiatal hernia    NALLELY (obstructive sleep apnea)    Type 2 diabetes mellitus with hyperglycemia (Multi)    Acute appendicitis with localized peritonitis, without perforation or abscess     1.  Acute appendicitis  -Operative day for open appendectomy complicated by extensive adhesions.  Appendix was found nonruptured.  Continues on Zosyn.     2.  HTN  -Significantly elevated on presentation likely related to acute appendicitis pain.  -Remains moderately elevated postoperatively and as n.p.o. he is on IV metoprolol and IV hydralazine.  Normally takes metoprolol and spironolactone he describes.     3.  DM 2  -Apparently was at 1 time on Ozempic but it became too expensive.  He states now he is on glipizide and feels he is under good control.  Check hemoglobin A1c.  SSI/Accu-Cheks     4.  CKD  -Does not follow with nephrologist.  Last available creatinine in EMR 1.87 from August 2022.  -Continue to follow but appears likely roughly at baseline.     5.  DVT  prophylaxis  -SCDs, otherwise per primary service.        Disposition/additional care plan/interventions:6/9/2024        Nasogastric tube removed with clear liquid diet ordered by general surgery.     Blood pressure elevated this a.m., patient nurse at the bedside plan to implement schedule antihypertensive therapy and rechecking of blood pressure and adjusting therapy as needed.     Monitor BP closely resume oral antihypertensive therapy, monitor hemodynamic status closely.     Continue IV as needed antihypertensive therapy.     Add spironolactone..................Given reported use by patient.  Verify home medications.     Blood glucose levels appeared improved from patient reported home baselines of 200 and above blood sugars .  Continue corrective insulin sliding scale and hypoglycemic protocol.     Monitor renal indices.     Stool softener per general surgery.     Monitor respiratory disposition closely with narcotics use, as needed Narcan.      Disposition/additional care plan/interventions:6/11/2024     Until oral antihypertensive therapy or okay by general surgery recommend implemented IV metoprolol substituted for daily beta-blocker use.  Given  Approximately 2. 5:1 ratio of p.o. to IV, estimated 50 mg po = 20 mg IV.  Escalate metoprolol for intravenous therapy frequency particular with patient refusal of IV hydralazine.     With n.p.o. status particularly  if for a long duration recommend avoiding starvation ketosis, consider adding dextrose to maintain to maintenance IV.  Monitor potassium levels closely particular given renal disposition.     Continue insulin sliding scale.     Close outpatient follow-up with family physician recommended to continue optimization of blood pressure and blood glucose levels...................Patient counseled     In light of  NSAID use recommend close monitoring of renal function.Continue stewardship as per primary service.      Disposition/additional care  plan/interventions:6/12/2024     Blood pressure still elevated.  Given patient's preference of antihypertensive therapy.  Somewhat limited but options are.  Unclear if patient ever had history of ACE inhibitor/ARB intolerant.  Will escalate Lopressor to 75 mg twice daily.     Patient at the time of examination appeared to be able to follow stream of thought without difficulty. Suspicion of postoperative delirium/perioperative neurocognitive disorder.     Check UA for completeness................. given reported confusion overnight     Monitor lidocaine patch use......................iatrogenic related confusion         Disposition/additional care plan/interventions:6/13/2024  Patient discharged home prior to hospitalist rounds by admitting service/surgical service.

## 2024-06-13 NOTE — CARE PLAN
The patient's goals for the shift include       Problem: Pain  Goal: My pain/discomfort is manageable  Outcome: Progressing     Problem: Safety  Goal: Patient will be injury free during hospitalization  Outcome: Progressing  Goal: I will remain free of falls  Outcome: Progressing     Problem: Daily Care  Goal: Daily care needs are met  Outcome: Progressing     Problem: Psychosocial Needs  Goal: Demonstrates ability to cope with hospitalization/illness  Outcome: Progressing  Goal: Collaborate with me, my family, and caregiver to identify my specific goals  Outcome: Progressing     Problem: Pain - Adult  Goal: Verbalizes/displays adequate comfort level or baseline comfort level  Outcome: Progressing     Problem: Safety - Adult  Goal: Free from fall injury  Outcome: Progressing     Problem: Discharge Planning  Goal: Discharge to home or other facility with appropriate resources  Outcome: Progressing     Problem: Chronic Conditions and Co-morbidities  Goal: Patient's chronic conditions and co-morbidity symptoms are monitored and maintained or improved  Outcome: Progressing     Problem: Diabetes  Goal: Maintain glucose levels >70mg/dl to <250mg/dl throughout shift  Outcome: Progressing  Goal: No changes in neurological exam by end of shift  Outcome: Progressing  Goal: Vital signs within normal range for age by end of shift  Outcome: Progressing  Goal: Increase self care and/or family involovement by end of shift  Outcome: Progressing     Problem: Pain  Goal: Takes deep breaths with improved pain control throughout the shift  Outcome: Progressing  Goal: Turns in bed with improved pain control throughout the shift  Outcome: Progressing  Goal: Walks with improved pain control throughout the shift  Outcome: Progressing  Goal: Performs ADL's with improved pain control throughout shift  Outcome: Progressing  Goal: Free from opioid side effects throughout the shift  Outcome: Progressing  Goal: Free from acute confusion related  to pain meds throughout the shift  Outcome: Progressing     Problem: Skin  Goal: Participates in plan/prevention/treatment measures  Outcome: Progressing  Flowsheets (Taken 6/13/2024 0747)  Participates in plan/prevention/treatment measures:   Discuss with provider PT/OT consult   Elevate heels   Increase activity/out of bed for meals  Goal: Prevent/manage excess moisture  Outcome: Progressing  Flowsheets (Taken 6/13/2024 0747)  Prevent/manage excess moisture:   Follow provider orders for dressing changes   Monitor for/manage infection if present  Goal: Prevent/minimize sheer/friction injuries  Outcome: Progressing  Flowsheets (Taken 6/13/2024 0747)  Prevent/minimize sheer/friction injuries:   Increase activity/out of bed for meals   Turn/reposition every 2 hours/use positioning/transfer devices   Use pull sheet  Goal: Promote/optimize nutrition  Outcome: Progressing  Flowsheets (Taken 6/13/2024 0747)  Promote/optimize nutrition:   Consume > 50% meals/supplements   Monitor/record intake including meals   Offer water/supplements/favorite foods  Goal: Promote skin healing  Outcome: Progressing  Flowsheets (Taken 6/13/2024 0747)  Promote skin healing:   Assess skin/pad under line(s)/device(s)   Protective dressings over bony prominences   Turn/reposition every 2 hours/use positioning/transfer devices   Ensure correct size (line/device) and apply per  instructions   Rotate device position/do not position patient on device     Problem: Nutrition  Goal: Less than 5 days NPO/clear liquids  Outcome: Progressing  Goal: BG  mg/dL  Outcome: Progressing  Goal: Promote healing  Outcome: Progressing  Goal: Maintain stable weight  Outcome: Progressing     Problem: Fall/Injury  Goal: Not fall by end of shift  Outcome: Progressing  Goal: Be free from injury by end of the shift  Outcome: Progressing  Goal: Verbalize understanding of personal risk factors for fall in the hospital  Outcome: Progressing  Goal:  Verbalize understanding of risk factor reduction measures to prevent injury from fall in the home  Outcome: Progressing  Goal: Use assistive devices by end of the shift  Outcome: Progressing  Goal: Pace activities to prevent fatigue by end of the shift  Outcome: Progressing

## 2024-06-13 NOTE — DISCHARGE SUMMARY
Discharge Diagnosis  Generalized abdominal pain    Issues Requiring Follow-Up  Wound   Pathology    Test Results Pending At Discharge  Pending Labs       Order Current Status    Surgical Pathology Exam In process            Hospital Course    underwent an appendectomy performed on 8th June 2024.  Postoperatively he had issues with an ileus and hypertensive urgency.  His ileus resolved with conservative management with the help of an NGT.  For his hypertensive disease, his lopressor dose was increased and he was also started on PO hydralazine.  He was deemed suitable for discharge as he was tolerating a low-fibre diet, had no issues with bowel movements, nor micturition, pain was controlled with oral pain medications, and was ambulating well with an abdominal binder in place.      Pertinent Physical Exam At Time of Discharge  Physical Exam  NAD  RRR  Resp soft  Abd soft, staples intact    Home Medications     Medication List      START taking these medications     docusate sodium 100 mg capsule; Commonly known as: Colace; Take 1   capsule (100 mg) by mouth 2 times a day.   hydrALAZINE 10 mg tablet; Commonly known as: Apresoline; Take 1 tablet   (10 mg) by mouth 3 times a day.   lidocaine 4 % patch; Place 2 patches over 12 hours on the skin once   daily for 5 days. Remove & discard patch within 12 hours or as directed by   MD.; Start taking on: June 14, 2024   oxyCODONE 5 mg immediate release tablet; Commonly known as: Roxicodone;   Take 1 tablet (5 mg) by mouth every 6 hours if needed for moderate pain (4   - 6) or severe pain (7 - 10).     CHANGE how you take these medications     acetaminophen 325 mg tablet; Commonly known as: TylenoL; Take 2 tablets   (650 mg) by mouth every 6 hours if needed for mild pain (1 - 3).; What   changed: when to take this, reasons to take this   metoprolol tartrate 75 mg tablet; Commonly known as: Lopressor; Take 1   tablet (75 mg) by mouth 2 times a day.; What changed:  medication strength,   how much to take     CONTINUE taking these medications     aspirin 81 mg EC tablet   fluticasone 50 mcg/actuation nasal spray; Commonly known as: Flonase   glipiZIDE 10 mg tablet; Commonly known as: Glucotrol   OneTouch Ultra Test strip; Generic drug: blood sugar diagnostic   OneTouch Ultra2 Meter misc; Generic drug: blood-glucose meter   rosuvastatin 20 mg tablet; Commonly known as: Crestor   spironolactone 25 mg tablet; Commonly known as: Aldactone   tamsulosin 0.4 mg 24 hr capsule; Commonly known as: Flomax       Outpatient Follow-Up  Future Appointments   Date Time Provider Department Center   7/1/2024  9:30 AM Misha Stafford MD [id number] [facility]       Misha Stafford MD

## 2024-06-13 NOTE — DISCHARGE INSTRUCTIONS
No tub baths, swimming, nor submerging of your wound underwater until seen at the follow-up appointment.    Try alternating tylenol and motrin for pain control.  If not relieved, then take oxycodone on a PRN basis.  If having to take oxycodone consistently, remember to take a stool softener also prescribed.    No heavy lifting of anything greater than 10lbs for a total of 6 weeks from your date of surgery.    Your staples will be removed at your follow-up appointment.    Use you abdominal binder when getting in and out of bed, moving to a sitting position, and walking.  No need to keep it on during the night in bed.    If you have any urgent issues or emergencies whilst at home, please feel free to call/text me directly at #[phone] ().

## 2024-06-17 LAB
ATRIAL RATE: 83 BPM
P AXIS: 26 DEGREES
PR INTERVAL: 266 MS
Q ONSET: 253 MS
QRS COUNT: 13 BEATS
QRS DURATION: 103 MS
QT INTERVAL: 382 MS
QTC CALCULATION(BAZETT): 449 MS
QTC FREDERICIA: 425 MS
R AXIS: -48 DEGREES
T AXIS: 34 DEGREES
T OFFSET: 444 MS
VENTRICULAR RATE: 83 BPM

## 2024-06-28 LAB
LABORATORY COMMENT REPORT: NORMAL
PATH REPORT.FINAL DX SPEC: NORMAL
PATH REPORT.GROSS SPEC: NORMAL
PATH REPORT.RELEVANT HX SPEC: NORMAL
PATH REPORT.TOTAL CANCER: NORMAL

## 2024-07-01 ENCOUNTER — APPOINTMENT (OUTPATIENT)
Dept: SURGERY | Facility: CLINIC | Age: 69
End: 2024-07-01
Payer: MEDICARE

## 2024-07-01 VITALS
WEIGHT: 270.8 LBS | OXYGEN SATURATION: 98 % | HEIGHT: 72 IN | HEART RATE: 56 BPM | BODY MASS INDEX: 36.68 KG/M2 | DIASTOLIC BLOOD PRESSURE: 81 MMHG | SYSTOLIC BLOOD PRESSURE: 193 MMHG

## 2024-07-01 DIAGNOSIS — Z90.49 S/P APPENDECTOMY: ICD-10-CM

## 2024-07-01 DIAGNOSIS — R19.7 DIARRHEA, UNSPECIFIED TYPE: Primary | ICD-10-CM

## 2024-07-01 DIAGNOSIS — Z87.19 HISTORY OF ISCHEMIC COLITIS: ICD-10-CM

## 2024-07-01 PROCEDURE — 1111F DSCHRG MED/CURRENT MED MERGE: CPT | Performed by: SURGERY

## 2024-07-01 PROCEDURE — 1159F MED LIST DOCD IN RCRD: CPT | Performed by: SURGERY

## 2024-07-01 PROCEDURE — 3079F DIAST BP 80-89 MM HG: CPT | Performed by: SURGERY

## 2024-07-01 PROCEDURE — 99024 POSTOP FOLLOW-UP VISIT: CPT | Performed by: SURGERY

## 2024-07-01 PROCEDURE — 3077F SYST BP >= 140 MM HG: CPT | Performed by: SURGERY

## 2024-07-01 NOTE — PROGRESS NOTES
Subjective   Patient ID: Kaveh Adkins is a 69 y.o. male who presents for Follow-up (Patient is here for a 2 week POV of his Open Appendectomy on 6/8/24.  Patient denies redness, bruising, bleeding or pain. Patient reports pulling sensation when sitting especially with seatbelt on. ).    HPI   underwent a lap converted to open appendectomy with lysis of adhesions performed on 8th June 2024.  His post-operative course was complicated with issues with hypertension and an ileus which was self-limiting.  His main complaint today is having some loose bowel movements with some faecal incontinence since his surgery.  He denies abdominal pain, is able to keep PO intake down without nausea/emesis, and also denies fevers/chills.    His history is noted for having undergone a colonoscopy in August 2022 by  - preparation of the colon was inadequate, congested mucosa in the proximal descending colon, mucosal ulceration with findings consistent with possible ischaemic colitis.  Biopsies obtained at the same time showed the following:  FINAL DIAGNOSIS  A.  ASCENDING COLON, BIOPSY:       - SMALL FRAGMENT OF BENIGN COLONIC-TYPE MUCOSA WITH MILD LAMINA PROPRIA  EDEMA            AND VASCULAR CONGESTION; NEGATIVE FOR DYSPLASIA/MALIGNANCY.    B.  COLON (SPLENIC FLEXURE), BIOPSIES:       - BENIGN COLONIC-TYPE MUCOSA WITH ISCHEMIA-RELATED CHANGES; NEGATIVE FOR            DYSPLASIA/MALIGNANCY.       - INFLAMMATORY PSEUDOMEMBRANES NOT PRESENT IN SUBMITTED MATERIAL.    Note: A vascular etiology to the ischemia would be favored.  Please correlate  with the clinical findings.       Review of Systems  Constitutional: Denies changes in weight, fatigue, night-sweats  HEENT: No changes in vision, nasal drainage, headache  CV: No palpitations, no chest pain, no lower extremity oedema  Resp: No wheezing, no cough, no shortness of breath  GI: No nausea, vomiting, +diarrhoea, no constipation  : No dysuria, no increased  frequency  Neuro: No weakness, confusion, numbness, dizziness  MSK: No weakness, arthralgias, myalgias  Haem: No easy bruising, no easy bleeding  Skin: No new lesions or rashes  Endocrine: No polydipsia, polyuria, heat/cold insensitivity    Objective   BP (!) 193/81   Pulse 56   Ht 1.829 m (6')   Wt 123 kg (270 lb 12.8 oz)   SpO2 98%   BMI 36.73 kg/m²     Physical Exam  Vitals reviewed.   Cardiovascular:      Rate and Rhythm: Normal rate.      Pulses: Normal pulses.   Pulmonary:      Effort: Pulmonary effort is normal.   Abdominal:      Palpations: Abdomen is soft.      Comments: Obese, midline wound intact, staples removed, NT   Skin:     General: Skin is warm.      Capillary Refill: Capillary refill takes less than 2 seconds.   Neurological:      General: No focal deficit present.      Mental Status: He is alert.   Psychiatric:         Mood and Affect: Mood normal.       Pathology:  FINAL DIAGNOSIS   A. APPENDIX, APPENDECTOMY:  ACUTE APPENDICITIS AND PERIAPPENDICITIS.       Electronically signed by Odalys Valdez MD PhD on 6/28/2024 at 1803       Assessment/Plan   69M s/p open appendectomy and extensive LILY with pathology showing acute appendicitis with recurrent loose bowel movements.    We reviewed the pathology findings which showed acute appendicitis.  His loose bowel movements may still be from some slow resolution of his ileus vs baseline colitis.  His previous colonoscopy was significant for ischaemic colitis and he should follow-up with the GI service for evaluation for possible repeat colonoscopy.    With regards to his midline wound, there is no further follow-up required with me and can be seen on a PRN basis.    Problem List Items Addressed This Visit    None  Visit Diagnoses         Codes    Diarrhea, unspecified type    -  Primary R19.7    Relevant Orders    Referral to Gastroenterology    S/P appendectomy     Z90.49    History of ischemic colitis     Z87.19                 Misha  MD Rivera 07/01/24 9:33 AM

## 2024-07-30 ENCOUNTER — PHARMACY VISIT (OUTPATIENT)
Dept: PHARMACY | Facility: CLINIC | Age: 69
End: 2024-07-30
Payer: MEDICARE

## 2024-07-30 PROCEDURE — RXMED WILLOW AMBULATORY MEDICATION CHARGE

## 2024-07-30 RX ORDER — HYDRALAZINE HYDROCHLORIDE 10 MG/1
10 TABLET, FILM COATED ORAL 3 TIMES DAILY
Qty: 270 TABLET | Refills: 1 | OUTPATIENT
Start: 2024-07-30

## 2024-08-02 NOTE — PROGRESS NOTES
GENERAL SURGERY PROGRESS NOTE    Kaveh Adkins   1955   [id number]     Kaveh Adkins is a 69 y.o. male on day 1 of admission presenting with Generalized abdominal pain.      Subjective  Pt YAN overnight. Doing well post-op. Pain controlled appropriately. Minimal NGT output. Eager to ambulate. No n/v. Unsure if he is passing flatus, no bowel movements.    Review of Systems   Constitutional:  Negative for chills and fever.   HENT:  Negative for congestion, sore throat and trouble swallowing.    Eyes:  Negative for discharge and redness.   Respiratory:  Negative for cough and shortness of breath.    Cardiovascular:  Negative for chest pain and palpitations.   Gastrointestinal:  Positive for abdominal pain. Negative for abdominal distention, constipation, diarrhea, nausea and vomiting.   Endocrine: Negative for cold intolerance and heat intolerance.   Genitourinary:  Negative for difficulty urinating, dysuria, frequency and urgency.   Musculoskeletal:  Negative for arthralgias, back pain and neck pain.   Skin:  Negative for color change and rash.   Neurological:  Negative for dizziness, speech difficulty, weakness and light-headedness.       Objective    Last Recorded Vitals  Blood pressure 174/81, pulse 68, temperature 37.3 °C (99.1 °F), temperature source Temporal, resp. rate 15, height 1.829 m (6'), weight 121 kg (266 lb 15.6 oz), SpO2 94%.    Intake/Output last 3 Shifts:  I/O last 3 completed shifts:  In: 2920 (24.1 mL/kg) [I.V.:1620 (13.4 mL/kg); IV Piggyback:1300]  Out: 2020 (16.7 mL/kg) [Urine:1970 (0.5 mL/kg/hr); Emesis/NG output:50]  Weight: 121.1 kg     Gen: NAD.  A&Ox3  HEENT: NC/AT.  Moist mucous membranes. NGT in place, gastric fluid output, light bilious tinge.  Neck: Normal range of motion.  CV: Regular rate.  Chest: Normal chest rise.  Normal respiratory effort. 2L on incentive spirometer.  Abdomen: Soft.  TTP perincisionally and in the RLQ. Dressing with dried blood on the inferior aspect, does  Patient is now on Comfort Care , was removed from vent and capped @16:10  per MD order , Patient was placed on oxygen NC per family request    not appear acute. No rigidity or guarding.  Extremities: No edema.  Moving all extremities.    Relevant Results  Results for orders placed or performed during the hospital encounter of 06/07/24 (from the past 24 hour(s))   CBC and Auto Differential   Result Value Ref Range    WBC 13.4 (H) 4.4 - 11.3 x10*3/uL    nRBC 0.0 0.0 - 0.0 /100 WBCs    RBC 5.25 4.50 - 5.90 x10*6/uL    Hemoglobin 16.2 13.5 - 17.5 g/dL    Hematocrit 48.5 41.0 - 52.0 %    MCV 92 80 - 100 fL    MCH 30.9 26.0 - 34.0 pg    MCHC 33.4 32.0 - 36.0 g/dL    RDW 13.6 11.5 - 14.5 %    Platelets 100 (L) 150 - 450 x10*3/uL    Neutrophils % 87.5 40.0 - 80.0 %    Immature Granulocytes %, Automated 0.4 0.0 - 0.9 %    Lymphocytes % 6.5 13.0 - 44.0 %    Monocytes % 5.4 2.0 - 10.0 %    Eosinophils % 0.0 0.0 - 6.0 %    Basophils % 0.2 0.0 - 2.0 %    Neutrophils Absolute 11.74 (H) 1.20 - 7.70 x10*3/uL    Immature Granulocytes Absolute, Automated 0.06 0.00 - 0.70 x10*3/uL    Lymphocytes Absolute 0.87 (L) 1.20 - 4.80 x10*3/uL    Monocytes Absolute 0.73 0.10 - 1.00 x10*3/uL    Eosinophils Absolute 0.00 0.00 - 0.70 x10*3/uL    Basophils Absolute 0.03 0.00 - 0.10 x10*3/uL   Phosphorus   Result Value Ref Range    Phosphorus 4.0 2.5 - 4.9 mg/dL   Magnesium   Result Value Ref Range    Magnesium 1.71 1.60 - 2.40 mg/dL   Basic Metabolic Panel   Result Value Ref Range    Glucose 200 (H) 74 - 99 mg/dL    Sodium 136 136 - 145 mmol/L    Potassium 4.2 3.5 - 5.3 mmol/L    Chloride 103 98 - 107 mmol/L    Bicarbonate 23 21 - 32 mmol/L    Anion Gap 14 10 - 20 mmol/L    Urea Nitrogen 15 6 - 23 mg/dL    Creatinine 1.55 (H) 0.50 - 1.30 mg/dL    eGFR 49 (L) >60 mL/min/1.73m*2    Calcium 8.2 (L) 8.6 - 10.3 mg/dL   Morphology   Result Value Ref Range    RBC Morphology No significant RBC morphology present    POCT GLUCOSE   Result Value Ref Range    POCT Glucose 209 (H) 74 - 99 mg/dL   POCT GLUCOSE   Result Value Ref Range    POCT Glucose 156 (H) 74 - 99 mg/dL   POCT GLUCOSE   Result Value  Ref Range    POCT Glucose 114 (H) 74 - 99 mg/dL   POCT GLUCOSE   Result Value Ref Range    POCT Glucose 157 (H) 74 - 99 mg/dL   CBC   Result Value Ref Range    WBC 9.8 4.4 - 11.3 x10*3/uL    nRBC 0.0 0.0 - 0.0 /100 WBCs    RBC 5.12 4.50 - 5.90 x10*6/uL    Hemoglobin 15.5 13.5 - 17.5 g/dL    Hematocrit 46.1 41.0 - 52.0 %    MCV 90 80 - 100 fL    MCH 30.3 26.0 - 34.0 pg    MCHC 33.6 32.0 - 36.0 g/dL    RDW 13.6 11.5 - 14.5 %    Platelets 101 (L) 150 - 450 x10*3/uL   Magnesium   Result Value Ref Range    Magnesium 2.07 1.60 - 2.40 mg/dL   Basic Metabolic Panel   Result Value Ref Range    Glucose 147 (H) 74 - 99 mg/dL    Sodium 136 136 - 145 mmol/L    Potassium 4.3 3.5 - 5.3 mmol/L    Chloride 105 98 - 107 mmol/L    Bicarbonate 23 21 - 32 mmol/L    Anion Gap 12 10 - 20 mmol/L    Urea Nitrogen 13 6 - 23 mg/dL    Creatinine 1.28 0.50 - 1.30 mg/dL    eGFR 61 >60 mL/min/1.73m*2    Calcium 8.4 (L) 8.6 - 10.3 mg/dL       XR abdomen 1 view    Result Date: 6/8/2024  Interpreted By:  Mohit Brady, STUDY: XR ABDOMEN 1 VIEW; 6/8/2024 2:37 pm   INDICATION: Signs/Symptoms:NGT placement.   COMPARISON: None.   ACCESSION NUMBER(S): [id number]   ORDERING CLINICIAN: RYLIE BAUTISTA   TECHNIQUE: Upright portable KUB   FINDINGS: Examination is limited due to the portable technique, patient body habitus, and upright positioning of the patient. Nonetheless the nasogastric tube extends into the epigastrium with the tip likely lying within the body of the stomach. Left upper quadrant right upper quadrant surgical clips are present. Bowel-gas pattern assessment is limited by the technique. Nonetheless distended small bowel loops are suspected within the midabdomen. Air and fecal material is suspected to be present within the ascending colon and transverse colon. Pelvis is omitted from the exam. Intraperitoneal free air is not identified but sensitivity is likely diminished due to the technique.       Nasogastric tube extends into the  epigastrium. Small bowel distention is present.   MACRO: none   Signed by: Mohit Brady 6/8/2024 4:17 PM Dictation workstation:   [id number]    XR chest 1 view    Result Date: 6/8/2024  Interpreted By:  Mohit Brady, STUDY: XR CHEST 1 VIEW; 6/8/2024 2:37 pm   INDICATION: CLINICAL INFORMATION: Signs/Symptoms:NGT.   COMPARISON: Abdominal CT 06/27/2024   ACCESSION NUMBER(S): [id number]   ORDERING CLINICIAN: RYLIE BAUTISTA   TECHNIQUE: Portable chest one view.   FINDINGS: The cardiac size is indeterminate in view of the AP projection. Nasogastric tube overlies the mediastinum extending into the upper abdomen. No infiltrates or effusions are identified.  Left basilar density is thought to be secondary to an epicardial fat pad based on CT study from 06/07/2024.       No acute pathologic findings are identified. Nasogastric tube extends into the upper abdomen.   MACRO: none   Signed by: Mohit Brady 6/8/2024 4:14 PM Dictation workstation:   [id number]    CT abdomen pelvis w IV contrast    Result Date: 6/7/2024  Interpreted By:  Saad Lange, STUDY: CT ABDOMEN PELVIS W IV CONTRAST;  6/7/2024 8:32 pm   INDICATION: Signs/Symptoms:abdominal pain.   COMPARISON: CT abdomen and pelvis 08/25/2022   ACCESSION NUMBER(S): [id number]   ORDERING CLINICIAN: EARL ALSTON   TECHNIQUE: CT of the abdomen and pelvis was performed.  Standard contiguous axial images were obtained at 3 mm slice thickness through the abdomen and pelvis. Coronal and sagittal reconstructions at 3 mm slice thickness were performed.   100 ml of contrast Omnipaque 350 were administered intravenously without immediate complication.   FINDINGS: LOWER CHEST: Bibasilar atelectasis.   ABDOMEN:   LIVER: The liver is normal in size without evidence of focal liver lesions.   BILE DUCTS: The intrahepatic and extrahepatic ducts are not dilated.   GALLBLADDER: The gallbladder is surgically absent.   PANCREAS: The pancreas appears unremarkable without  evidence of ductal dilatation or masses.   SPLEEN: The spleen is normal in size without focal lesions.   ADRENAL GLANDS: Bilateral adrenal glands appear normal.   KIDNEYS AND URETERS: Interval resection of previously noted renal mass with cortical scarring and fat necrosis within the region of the previously noted mass. Additionally, there is a 3.1 x 1.7 cm soft tissue attenuation lesion within the partial nephrectomy bed, possibly sequela of prior treatment changes. There is mild surrounding soft tissue stranding. Multiple left simple cysts the largest measures 8.9 cm. No hydroureteronephrosis or nephroureterolithiasis is identified.   PELVIS:   BLADDER: The urinary bladder appears normal without abnormal wall thickening.   REPRODUCTIVE ORGANS: No pelvic masses.   BOWEL: Small hiatal hernia. The stomach is otherwise unremarkable. No bowel dilation or significant wall thickening. Moderate colonic stool. Partial colectomy with anastomotic sutures in the left hemipelvis appendicolith with distended appendix, measuring up to 16 mm, with wall thickening hyperemia and surrounding soft tissue stranding compatible with acute appendicitis.   VESSELS: There is no aneurysmal dilatation of the abdominal aorta. The IVC appears normal. Dense atherosclerotic calcifications.   PERITONEUM/RETROPERITONEUM/LYMPH NODES: There is no free or loculated fluid collection, no free intraperitoneal air. The retroperitoneum appears normal.  No abdominopelvic lymphadenopathy is present.   BONES AND ABDOMINAL WALL: No suspicious osseous lesions are identified. Degenerative discogenic disease is noted in the lower thoracic and lumbar spine.  Small fat containing inguinal hernias.       1.  Appendicolith with appendiceal dilation, surrounding soft tissue stranding and wall thickening compatible with acute appendicitis. No drainable fluid collection or pneumoperitoneum. 2. Interval resection previously noted right renal mass with sequela of fat  necrosis and a 3.1 x 1.7 cm soft tissue attenuation lesion within the nephrectomy bed. Although this lesion does not appear aggressive or malignant, further evaluation with MRI of the kidney is recommended. 3. Other stable findings as described above.     Signed by: Saad Lange 6/7/2024 9:02 PM Dictation workstation:   [id number]      Assessment and Plan  Principal Problem:    Generalized abdominal pain  Active Problems:    Abdominal pain, generalized    Primary hypertension    Hiatal hernia    NALLELY (obstructive sleep apnea)    Type 2 diabetes mellitus with hyperglycemia (Multi)    Acute appendicitis with localized peritonitis, without perforation or abscess    69 y.o. male with acute appendicitis s/p lap converted to open appendectomy    Plan:  Plan to remove NGT today  Start CLD  Convert home meds to PO  Discontinue antibiotics  Encourage incentive spirometry, ambulation  Dressing takedown tomorrow    Discussed with Dr. Rivera Loomis, DO  PGY2  General Surgery

## 2024-08-14 PROCEDURE — RXMED WILLOW AMBULATORY MEDICATION CHARGE

## 2024-08-15 ENCOUNTER — PHARMACY VISIT (OUTPATIENT)
Dept: PHARMACY | Facility: CLINIC | Age: 69
End: 2024-08-15
Payer: MEDICARE

## 2024-09-04 ENCOUNTER — PHARMACY VISIT (OUTPATIENT)
Dept: PHARMACY | Facility: CLINIC | Age: 69
End: 2024-09-04
Payer: COMMERCIAL

## 2024-09-04 ENCOUNTER — APPOINTMENT (OUTPATIENT)
Dept: GASTROENTEROLOGY | Facility: CLINIC | Age: 69
End: 2024-09-04
Payer: MEDICARE

## 2024-09-04 VITALS
HEART RATE: 55 BPM | OXYGEN SATURATION: 97 % | BODY MASS INDEX: 37.38 KG/M2 | HEIGHT: 72 IN | SYSTOLIC BLOOD PRESSURE: 210 MMHG | WEIGHT: 276 LBS | DIASTOLIC BLOOD PRESSURE: 92 MMHG

## 2024-09-04 DIAGNOSIS — I10 UNCONTROLLED HYPERTENSION: Primary | ICD-10-CM

## 2024-09-04 DIAGNOSIS — R19.7 DIARRHEA, UNSPECIFIED TYPE: ICD-10-CM

## 2024-09-04 PROCEDURE — 3080F DIAST BP >= 90 MM HG: CPT | Performed by: NURSE PRACTITIONER

## 2024-09-04 PROCEDURE — 3008F BODY MASS INDEX DOCD: CPT | Performed by: NURSE PRACTITIONER

## 2024-09-04 PROCEDURE — RXMED WILLOW AMBULATORY MEDICATION CHARGE

## 2024-09-04 PROCEDURE — 99204 OFFICE O/P NEW MOD 45 MIN: CPT | Performed by: NURSE PRACTITIONER

## 2024-09-04 PROCEDURE — 1159F MED LIST DOCD IN RCRD: CPT | Performed by: NURSE PRACTITIONER

## 2024-09-04 PROCEDURE — 3077F SYST BP >= 140 MM HG: CPT | Performed by: NURSE PRACTITIONER

## 2024-09-04 NOTE — PATIENT INSTRUCTIONS
Thank you for coming to your appointment today   - Please do your blood work and stool studies in the outpatient   - schedule with cardiology; call [phone] to schedule   - after your blood pressure is better controlled, we will plan for a colonoscopy     Please call [phone] with any questions or concerns       If you utilize [employer] messages, please understand that these are intended to be used for simple and straightforward medical questions. If you have a more complex question or numerous complaints, an office appointment may be needed.

## 2024-09-05 ASSESSMENT — ENCOUNTER SYMPTOMS
BRUISES/BLEEDS EASILY: 0
SHORTNESS OF BREATH: 0
WEAKNESS: 0
ROS GI COMMENTS: SEE HPI
JOINT SWELLING: 0
COUGH: 0
CHILLS: 0
WOUND: 0
TROUBLE SWALLOWING: 0
DIFFICULTY URINATING: 0
PALPITATIONS: 0
ARTHRALGIAS: 0
SORE THROAT: 0
DIZZINESS: 0
CONFUSION: 0
FEVER: 0
ADENOPATHY: 0

## 2024-09-05 NOTE — PROGRESS NOTES
Subjective   Patient ID: Kaveh Adkins is a 69 y.o. male with PMH of HTN, HLD, DM2, hiatal hernia, appendicitis, and NALLELY who was referred by Misha Stafford,* for Diarrhea (Referred by Gen Surg/ Xray 6/11/24, CT 6/7/24, Colon 2022).     Patient's PCP is Ashvin Quinonez MD     HPI  Patient reports chronic diarrhea for several months. He had an appendectomy in June 2024, but diarrhea started before this. It was previously worse and seems to be improved from before, but he is still having loose stools every day. Patient is unable to quantify how many BMs per day. No blood in stool and no nocturnal diarrhea. He denies unintended weight loss, N/V, abdominal pain, melena, constipation, or rectal pain.     He has a history of ischemic colitis in 2022, but has not had a colonoscopy since then.     His BP today is 210/92. He denies headache, blurry vision, numbness/tingling, SOB, or CP. He reports his BP typically runs about this high and he has had difficulty getting it managed.       Summary of endoscopies:  - Colonoscopy 10/2020: normal colon.   - Colonoscopy 8/2022: inadequate prep. Congested mucosa in proximal colon; mucosal ulceration c/w ischemic colitis. Pathology showed ischemia related changes    Social Hx:  Tobacco:   Etoh:  Recreational drug use:  NSAIDs:      Family Hx:      Review of Systems:  Review of Systems   Constitutional:  Negative for chills and fever.   HENT:  Negative for sore throat and trouble swallowing.    Respiratory:  Negative for cough and shortness of breath.    Cardiovascular:  Negative for chest pain and palpitations.   Gastrointestinal:         SEE HPI   Endocrine: Negative for cold intolerance and heat intolerance.   Genitourinary:  Negative for difficulty urinating.   Musculoskeletal:  Negative for arthralgias and joint swelling.   Skin:  Negative for rash and wound.   Neurological:  Negative for dizziness and weakness.   Hematological:  Negative for adenopathy. Does not  bruise/bleed easily.   Psychiatric/Behavioral:  Negative for confusion.         Medications:  Prior to Admission medications    Medication Sig Start Date End Date Taking? Authorizing Provider   acetaminophen (TylenoL) 325 mg tablet Take 2 tablets (650 mg) by mouth every 6 hours if needed for mild pain (1 - 3). 6/13/24  Yes Misha Stafford MD   aspirin 81 mg EC tablet Take 1 tablet (81 mg) by mouth once daily.   Yes Historical Provider, MD   fluticasone (Flonase) 50 mcg/actuation nasal spray Administer 2 sprays into affected nostril(s) once daily.   Yes Historical Provider, MD   glipiZIDE (Glucotrol) 10 mg tablet Take 1 tablet (10 mg) by mouth once daily. 8/7/23  Yes Historical Provider, MD   hydrALAZINE (Apresoline) 25 mg tablet Take 1 tablet (25 mg) by mouth 3 times a day. 8/14/24  Yes    metoprolol tartrate (Lopressor) 50 mg tablet Take 1.5 tablets by mouth 2 times a day. 6/13/24  Yes Misha Stafford MD   rosuvastatin (Crestor) 20 mg tablet Take 1 tablet (20 mg) by mouth once daily.   Yes Historical Provider, MD   spironolactone (Aldactone) 25 mg tablet Take 1 tablet (25 mg) by mouth once daily.   Yes Historical Provider, MD   tamsulosin (Flomax) 0.4 mg 24 hr capsule Take 1 capsule (0.4 mg) by mouth once daily.   Yes Historical Provider, MD   hydrALAZINE (Apresoline) 10 mg tablet Take 1 tablet (10 mg) by mouth 3 times a day. 6/13/24 7/13/24  Misha Stafford MD   hydrALAZINE (Apresoline) 10 mg tablet Take 1 tablet (10 mg) by mouth 3 times a day. 7/30/24      OneTouch Ultra Test strip  5/22/24   Historical Provider, MD   OneTouch Ultra2 Meter misc  12/15/23   Historical Provider, MD   oxyCODONE (Roxicodone) 5 mg immediate release tablet Take 1 tablet (5 mg) by mouth every 6 hours if needed for moderate pain (4 - 6) or severe pain (7 - 10). 6/13/24   Misha Stafford MD       Allergies:  Cephalosporins, Ciprofloxacin, Cefuroxime axetil, Doxycycline, Furosemide, and  Hydrochlorothiazide    Past Medical History:  He has a past medical history of Aortic aneurysm of unspecified site, without rupture (CMS-HCC), Cancer (Multi), Essential (primary) hypertension (12/04/2013), Personal history of malignant neoplasm, unspecified, Personal history of other diseases of the circulatory system, Personal history of other endocrine, nutritional and metabolic disease, Personal history of other specified conditions (06/18/2018), and Type 2 diabetes mellitus without complications (Multi) (12/04/2013).    Past Surgical History:  He has a past surgical history that includes Other surgical history (10/04/2021); Other surgical history (10/04/2021); Other surgical history (10/21/2022); Hernia repair (06/18/2018); Appendectomy (06/08/2024); and Abdominal surgery.    Social History:  He reports that he has been smoking cigars. He started smoking about 17 months ago. He has never used smokeless tobacco. He reports that he does not currently use alcohol. He reports that he does not use drugs.    Objective   Physical exam:  Physical Exam  Constitutional:       General: He is not in acute distress.     Appearance: Normal appearance.   HENT:      Mouth/Throat:      Mouth: Mucous membranes are moist.      Comments: pink  Eyes:      Conjunctiva/sclera: Conjunctivae normal.      Pupils: Pupils are equal, round, and reactive to light.   Cardiovascular:      Rate and Rhythm: Normal rate and regular rhythm.      Heart sounds: No murmur heard.  Pulmonary:      Effort: Pulmonary effort is normal.      Breath sounds: Normal breath sounds.   Abdominal:      General: Bowel sounds are normal. There is no distension.      Palpations: Abdomen is soft.      Tenderness: There is no abdominal tenderness. There is no guarding.   Skin:     General: Skin is warm and dry.      Coloration: Skin is not jaundiced.   Neurological:      Mental Status: He is alert and oriented to person, place, and time.   Psychiatric:         Mood  and Affect: Mood normal.         Behavior: Behavior normal.          Assessment/Plan     Diarrhea  Ongoing for several months. Will check chronic diarrhea lab work. Will plan for colonoscopy in the future, however, will need greater control of BP prior to scheduling.     Uncontrolled HTN   Referred to cardiology        Heidi Aguilar, YIN-CNP

## 2024-09-06 ENCOUNTER — TELEPHONE (OUTPATIENT)
Dept: GASTROENTEROLOGY | Facility: CLINIC | Age: 69
End: 2024-09-06

## 2024-09-06 ENCOUNTER — LAB (OUTPATIENT)
Dept: LAB | Facility: LAB | Age: 69
End: 2024-09-06
Payer: MEDICARE

## 2024-09-06 DIAGNOSIS — R19.7 DIARRHEA, UNSPECIFIED TYPE: ICD-10-CM

## 2024-09-06 PROCEDURE — 83993 ASSAY FOR CALPROTECTIN FECAL: CPT

## 2024-09-06 PROCEDURE — 82653 EL-1 FECAL QUANTITATIVE: CPT

## 2024-09-06 PROCEDURE — 87506 IADNA-DNA/RNA PROBE TQ 6-11: CPT

## 2024-09-07 LAB

## 2024-09-11 LAB
CALPROTECTIN STL-MCNT: 50 UG/G
ELASTASE PANC STL-MCNT: 216 UG/G

## 2024-09-12 ENCOUNTER — OFFICE VISIT (OUTPATIENT)
Dept: CARDIOLOGY | Facility: HOSPITAL | Age: 69
End: 2024-09-12
Payer: MEDICARE

## 2024-09-12 VITALS
SYSTOLIC BLOOD PRESSURE: 152 MMHG | WEIGHT: 276 LBS | DIASTOLIC BLOOD PRESSURE: 88 MMHG | HEART RATE: 54 BPM | BODY MASS INDEX: 37.38 KG/M2 | HEIGHT: 72 IN

## 2024-09-12 DIAGNOSIS — I10 UNCONTROLLED HYPERTENSION: ICD-10-CM

## 2024-09-12 DIAGNOSIS — R07.9 CHEST PAIN: Primary | ICD-10-CM

## 2024-09-12 PROBLEM — D48.5 NEOPLASM OF UNCERTAIN BEHAVIOR OF SKIN: Status: ACTIVE | Noted: 2020-01-29

## 2024-09-12 PROBLEM — Z20.822 CONTACT WITH AND (SUSPECTED) EXPOSURE TO COVID-19: Status: ACTIVE | Noted: 2022-09-29

## 2024-09-12 PROBLEM — N28.9 RENAL LESION: Status: ACTIVE | Noted: 2024-09-12

## 2024-09-12 PROBLEM — L82.1 OTHER SEBORRHEIC KERATOSIS: Status: ACTIVE | Noted: 2020-01-29

## 2024-09-12 PROBLEM — Z86.39 HISTORY OF METABOLIC DISORDER: Status: ACTIVE | Noted: 2024-09-12

## 2024-09-12 PROBLEM — I71.20 ANEURYSM OF THORACIC AORTA (CMS-HCC): Status: ACTIVE | Noted: 2020-02-27

## 2024-09-12 PROBLEM — C44.519 BASAL CELL CARCINOMA OF SKIN OF OTHER PART OF TRUNK: Status: ACTIVE | Noted: 2020-01-29

## 2024-09-12 PROBLEM — R10.9 ABDOMINAL PAIN: Status: ACTIVE | Noted: 2024-06-07

## 2024-09-12 PROBLEM — R15.9 FULL INCONTINENCE OF FECES: Status: ACTIVE | Noted: 2024-09-12

## 2024-09-12 PROBLEM — Z86.0100 HISTORY OF COLONIC POLYPS: Status: ACTIVE | Noted: 2020-08-26

## 2024-09-12 PROBLEM — M54.2 NECK PAIN: Status: ACTIVE | Noted: 2024-09-12

## 2024-09-12 PROBLEM — K92.1 HEMATOCHEZIA: Status: ACTIVE | Noted: 2024-09-12

## 2024-09-12 PROBLEM — Z86.79 HISTORY OF HYPERTENSION: Status: ACTIVE | Noted: 2024-09-12

## 2024-09-12 PROBLEM — I71.40 ABDOMINAL AORTIC ANEURYSM, WITHOUT RUPTURE, UNSPECIFIED (CMS-HCC): Status: ACTIVE | Noted: 2023-07-21

## 2024-09-12 PROBLEM — R74.8 ELEVATED ALKALINE PHOSPHATASE LEVEL: Status: ACTIVE | Noted: 2024-09-12

## 2024-09-12 PROBLEM — S21.232D: Status: ACTIVE | Noted: 2020-02-27

## 2024-09-12 PROBLEM — R82.998 DARK BROWN-COLORED URINE: Status: ACTIVE | Noted: 2024-09-12

## 2024-09-12 PROBLEM — I25.10 ATHEROSCLEROTIC HEART DISEASE OF NATIVE CORONARY ARTERY WITHOUT ANGINA PECTORIS: Status: ACTIVE | Noted: 2020-02-27

## 2024-09-12 PROBLEM — M15.9 OSTEOARTHRITIS OF MULTIPLE JOINTS: Status: ACTIVE | Noted: 2022-09-29

## 2024-09-12 PROBLEM — M25.559 ARTHRALGIA OF HIP: Status: ACTIVE | Noted: 2024-09-12

## 2024-09-12 PROBLEM — D22.60 MELANOCYTIC NEVI OF UNSPECIFIED UPPER LIMB, INCLUDING SHOULDER: Status: ACTIVE | Noted: 2020-01-29

## 2024-09-12 PROBLEM — L57.9 SKIN CHANGES DUE TO CHRONIC EXPOSURE TO NONIONIZING RADIATION, UNSPECIFIED: Status: ACTIVE | Noted: 2020-01-29

## 2024-09-12 PROBLEM — D22.39 MELANOCYTIC NEVI OF OTHER PARTS OF FACE: Status: ACTIVE | Noted: 2020-01-29

## 2024-09-12 PROBLEM — Z90.49 HISTORY OF APPENDECTOMY: Status: ACTIVE | Noted: 2024-09-12

## 2024-09-12 PROBLEM — L71.9 ROSACEA, UNSPECIFIED: Status: ACTIVE | Noted: 2020-01-29

## 2024-09-12 PROBLEM — E55.9 VITAMIN D DEFICIENCY: Status: ACTIVE | Noted: 2022-09-29

## 2024-09-12 PROBLEM — R31.0 GROSS HEMATURIA: Status: ACTIVE | Noted: 2024-09-12

## 2024-09-12 PROBLEM — Z85.828 PERSONAL HISTORY OF OTHER MALIGNANT NEOPLASM OF SKIN: Status: ACTIVE | Noted: 2020-01-29

## 2024-09-12 PROBLEM — S37.009A INJURY OF KIDNEY: Status: ACTIVE | Noted: 2022-09-29

## 2024-09-12 PROBLEM — R58 RETROPERITONEAL HEMORRHAGE: Status: ACTIVE | Noted: 2021-12-19

## 2024-09-12 PROBLEM — M54.50 CHRONIC BILATERAL LOW BACK PAIN WITHOUT SCIATICA: Status: ACTIVE | Noted: 2019-04-08

## 2024-09-12 PROBLEM — M12.9 ARTHROPATHY: Status: ACTIVE | Noted: 2024-09-12

## 2024-09-12 PROBLEM — M48.10 ANKYLOSING VERTEBRAL HYPEROSTOSIS: Status: ACTIVE | Noted: 2021-02-09

## 2024-09-12 PROBLEM — M15.9 DJD (DEGENERATIVE JOINT DISEASE), MULTIPLE SITES: Status: ACTIVE | Noted: 2024-09-12

## 2024-09-12 PROBLEM — Z86.010 HISTORY OF COLONIC POLYPS: Status: ACTIVE | Noted: 2020-08-26

## 2024-09-12 PROBLEM — N18.30 STAGE 3 CHRONIC KIDNEY DISEASE (MULTI): Status: ACTIVE | Noted: 2020-02-27

## 2024-09-12 PROBLEM — K59.00 CONSTIPATION: Status: ACTIVE | Noted: 2020-08-26

## 2024-09-12 PROBLEM — E66.01 MORBID OBESITY (MULTI): Status: ACTIVE | Noted: 2020-02-27

## 2024-09-12 PROBLEM — K55.9 ISCHEMIC COLITIS (MULTI): Status: ACTIVE | Noted: 2024-09-12

## 2024-09-12 PROBLEM — G44.209 TENSION-TYPE HEADACHE: Status: ACTIVE | Noted: 2020-08-26

## 2024-09-12 PROBLEM — G89.29 CHRONIC BILATERAL LOW BACK PAIN WITHOUT SCIATICA: Status: ACTIVE | Noted: 2019-04-08

## 2024-09-12 PROBLEM — M16.0 PRIMARY OSTEOARTHRITIS OF BOTH HIPS: Status: ACTIVE | Noted: 2019-04-08

## 2024-09-12 PROCEDURE — 3079F DIAST BP 80-89 MM HG: CPT | Performed by: INTERNAL MEDICINE

## 2024-09-12 PROCEDURE — 1159F MED LIST DOCD IN RCRD: CPT | Performed by: INTERNAL MEDICINE

## 2024-09-12 PROCEDURE — 93005 ELECTROCARDIOGRAM TRACING: CPT | Performed by: INTERNAL MEDICINE

## 2024-09-12 PROCEDURE — 93010 ELECTROCARDIOGRAM REPORT: CPT | Performed by: INTERNAL MEDICINE

## 2024-09-12 PROCEDURE — 99203 OFFICE O/P NEW LOW 30 MIN: CPT | Performed by: INTERNAL MEDICINE

## 2024-09-12 PROCEDURE — 3077F SYST BP >= 140 MM HG: CPT | Performed by: INTERNAL MEDICINE

## 2024-09-12 PROCEDURE — 99213 OFFICE O/P EST LOW 20 MIN: CPT | Performed by: INTERNAL MEDICINE

## 2024-09-12 PROCEDURE — 4010F ACE/ARB THERAPY RXD/TAKEN: CPT | Performed by: INTERNAL MEDICINE

## 2024-09-12 PROCEDURE — 3008F BODY MASS INDEX DOCD: CPT | Performed by: INTERNAL MEDICINE

## 2024-09-12 RX ORDER — AMINOPHYLLINE 25 MG/ML
125 INJECTION, SOLUTION INTRAVENOUS ONCE AS NEEDED
OUTPATIENT
Start: 2024-09-12

## 2024-09-12 RX ORDER — REGADENOSON 0.08 MG/ML
0.4 INJECTION, SOLUTION INTRAVENOUS
OUTPATIENT
Start: 2024-09-12

## 2024-09-12 RX ORDER — LOSARTAN POTASSIUM 50 MG/1
50 TABLET ORAL DAILY
Qty: 90 TABLET | Refills: 3 | Status: SHIPPED | OUTPATIENT
Start: 2024-09-12 | End: 2025-09-12

## 2024-09-12 ASSESSMENT — ENCOUNTER SYMPTOMS
HEADACHES: 1
LOSS OF SENSATION IN FEET: 0
OCCASIONAL FEELINGS OF UNSTEADINESS: 0
DEPRESSION: 0
DYSPNEA ON EXERTION: 1

## 2024-09-12 NOTE — LETTER
September 12, 2024     YIN Fu-CNP  [address]  [facility] [id number]    Patient: Kaveh Adkins   YOB: 1955   Date of Visit: 9/12/2024       Dear YIN Potter-CNP:    Thank you for referring Kaveh Adkins to me for evaluation. Below are my notes for this consultation.  If you have questions, please do not hesitate to call me. I look forward to following your patient along with you.       Sincerely,     Wu Dwyer MD      CC: Ashvin Quinonez MD  ______________________________________________________________________________________    Referred by NAA Balderrama for HTN     Counseling:  The patient was counseled regarding diagnostic results, instructions for management, risk factor reductions, prognosis, patient and family education, impressions, risks and benefits of treatment options and importance of compliance with treatment.      History Of Present Illness:    Kaveh Adkins is a 69 y.o. male patient whose PMH is significant for HTN, DM type 2, hiatal hernia and NALLELY. He presents today to establish cardiovascular care for the evaluation and management of HTN. The patient reports a longstanding history of HTN that has been progressively more difficult to manage. He reports frequent headaches. The patient also reports occasional chest discomfort and exertional SOB. He denies any LE edema or sleep disturbance, but does report daytime fatigue. The patient was diagnosed with NALLELY in the past, but has been intolerant of CPAP therapy in the past. He is currently on hydralazine 25 mg TID, metoprolol tartrate 75 mg BID and spironolactone 25 mg daily. The patient's family history is negative for HTN as far as he is aware.     Past Surgical History:  He has a past surgical history that includes Other surgical history (10/04/2021); Other surgical history (10/04/2021); Other surgical history (10/21/2022); Hernia repair  (06/18/2018); Appendectomy (06/08/2024); and Abdominal surgery.      Social History:  He reports that he has been smoking cigars. He started smoking about 17 months ago. He has never used smokeless tobacco. He reports that he does not currently use alcohol. He reports that he does not use drugs.    Family History:  Family History   Problem Relation Name Age of Onset   • Colon cancer Other mat. great granfather 96        Allergies:  Cephalosporins, Ciprofloxacin, Cefuroxime axetil, Doxycycline, Furosemide, and Hydrochlorothiazide    Outpatient Medications:  Current Outpatient Medications   Medication Instructions   • acetaminophen (TYLENOL) 650 mg, oral, Every 6 hours PRN   • aspirin 81 mg, oral, Daily   • fluticasone (Flonase) 50 mcg/actuation nasal spray 2 sprays, nasal, Daily   • glipiZIDE (GLUCOTROL) 10 mg, oral, Daily   • hydrALAZINE (APRESOLINE) 10 mg, oral, 3 times daily   • hydrALAZINE (APRESOLINE) 10 mg, oral, 3 times daily   • hydrALAZINE (APRESOLINE) 25 mg, oral, 3 times daily   • metoprolol tartrate (LOPRESSOR) 75 mg, oral, 2 times daily   • OneTouch Ultra Test strip    • OneTouch Ultra2 Meter misc    • oxyCODONE (ROXICODONE) 5 mg, oral, Every 6 hours PRN   • rosuvastatin (Crestor) 20 mg tablet 1 tablet, oral, Daily   • spironolactone (Aldactone) 25 mg tablet 1 tablet, oral, Daily   • tamsulosin (Flomax) 0.4 mg 24 hr capsule 1 capsule, oral, Daily        Last Recorded Vitals:  Vitals:    09/12/24 1150   BP: 152/88   BP Location: Left arm   Pulse: 54   Weight: 125 kg (276 lb)   Height: 1.829 m (6')       Review of Systems   Constitutional: Positive for malaise/fatigue.   Cardiovascular:  Positive for dyspnea on exertion.        Chest discomfort   Neurological:  Positive for headaches.   All other systems reviewed and are negative.     Physical Exam:  Constitutional:       Appearance: Healthy appearance. Not in distress.   Neck:      Vascular: No JVR. JVD normal.   Pulmonary:      Effort: Pulmonary effort  is normal.      Breath sounds: Normal breath sounds. No wheezing. No rhonchi. No rales.   Chest:      Chest wall: Not tender to palpatation.   Cardiovascular:      PMI at left midclavicular line. Normal rate. Regular rhythm. Normal S1. Normal S2.       Murmurs: There is no murmur.      No gallop.  No click. No rub.   Pulses:     Intact distal pulses.   Edema:     Peripheral edema absent.   Abdominal:      General: Bowel sounds are normal.      Palpations: Abdomen is soft.      Tenderness: There is no abdominal tenderness.   Musculoskeletal: Normal range of motion.         General: No tenderness. Skin:     General: Skin is warm and dry.   Neurological:      General: No focal deficit present.      Mental Status: Alert and oriented to person, place and time.            Last Labs:  CBC -  Lab Results   Component Value Date    WBC 8.1 06/11/2024    HGB 14.9 06/11/2024    HCT 44.7 06/11/2024    MCV 90 06/11/2024     (L) 06/11/2024       CMP -  Lab Results   Component Value Date    CALCIUM 7.2 (L) 06/13/2024    PHOS 2.7 06/12/2024    PROT 6.8 06/10/2024    ALBUMIN 3.7 06/10/2024    AST 13 06/10/2024    ALT 20 06/10/2024    ALKPHOS 91 06/10/2024    BILITOT 1.7 (H) 06/10/2024       LIPID PANEL -   Lab Results   Component Value Date    CHOL 97 09/17/2021    TRIG 123 09/17/2021    HDL 36.4 (A) 09/17/2021    CHHDL 2.7 09/17/2021    LDLF 36 09/17/2021    VLDL 25 09/17/2021    NHDL 73 11/30/2019       RENAL FUNCTION PANEL -   Lab Results   Component Value Date    GLUCOSE 88 06/13/2024     06/13/2024    K 3.6 06/13/2024     (H) 06/13/2024    CO2 23 06/13/2024    ANIONGAP 12 06/13/2024    BUN 25 (H) 06/13/2024    CREATININE 1.18 06/13/2024    GFRMALE 39 (A) 08/29/2022    CALCIUM 7.2 (L) 06/13/2024    PHOS 2.7 06/12/2024    ALBUMIN 3.7 06/10/2024        Lab Results   Component Value Date    HGBA1C 6.9 (A) 12/19/2021       Last Cardiology Tests:  07/21/2023 - AAA U/S  No aneurysm involving visualized portions of  the abdominal aorta measuring up to 2.3 cm in greatest cross-sectional dimension proximally, 1.9 cm in the mid abdominal aorta, and 1.8 cm in greatest  cross-sectional dimension distally. Neither common iliac artery was able to be seen.    05/27/2022 - AAA U/S  1. Limited study due to underpenetration and the presence of bowel gas overlying the aorta.  2. Ectatic distal abdominal aorta, measuring up to 2.8 cm.  3. Incidentally seen oval-shaped, hypoechoic structure in the mid abdomen measuring up to 9.4 cm which is of uncertain etiology and significance. Finding could represent a large renal cyst given a large cyst was seen in the left kidney on prior CT 04/2019. However, further evaluation with CT of the abdomen and pelvis is recommended.    09/23/2020 - CT Lung Screening  1.  Very limited study due to patient's body habitus and low-dose technique causing significant beam hardening artifact.  2. Stable 3 mm nodule in the left upper lobe. Recommend follow-up chest CT in 12 months.  3. Stable appearance of a 1.8 cm left thyroid nodule.  4. Layering fluid in the mid esophagus. Correlation with reflux or motility disorder.  5. Again seen aneurysmal dilatation of the ascending thoracic aorta measuring 4.6 cm. This is unchanged compared to prior study from 2016 which measured 4.5 cm.  6. Dilated main pulmonary artery. Correlation with pulmonary artery hypertension.  7. Mild-to-moderate coronary artery calcification. Partially imaged soft tissue in the left upper quadrant of the abdomen and anterior to the left kidney which is likely a loop of bowel. However recommend correlation with renal ultrasound to exclude renal lesion.    08/12/2019 - CT Calcium Score  1. Coronary artery calcium score of 282*.  2. 3 mm nodule in the left upper lobe. If the patient is at risk of lung malignancy, consider follow-up chest CT in 12 months.  3. Phos for dilation of the ascending thoracic aorta measuring 4.4 cm.    Lab review: I have  personally reviewed the laboratory result(s).    Assessment/Plan  1) HTN  On hydralazine 25 mg TID, metoprolol tartrate 75 mg BID and spironolactone 25 mg daily  Longstanding h/o HTN   Progressively more difficult to manage  Reports frequent headaches  Denies LE edema  Fh negative for HTN   Start losartan 50 mg daily  Continue all other medications as prescribed   Check renal artery U/S  Check echo  Check cortisol, catecholamines, plasma metanephrines, aldosterone/renin activity  F/U after testing     2) Dilatation of Ascending Thoracic Aorta   CT lung screening 09/23/2020 with aneurysmal dilatation of the ascending thoracic aorta measuring 4.6 cm; stable from [facility] study from 2016     3) Elevated CT Calcium Score  CT calcium score 08/12/2019 with total score of 282  CT lung screening 09/23/2020 with mild-to-moderate coronary artery calcification    4) NALLELY  Intolerant of CPAP  Denies sleep disturbance, but reports daytime fatigue     5) CP and Exertional SOB  Reports occasional chest discomfort and exertional SOB  Check echo  Check Lexiscan Cardiolite stress test - unable to tolerate treadmill stress test s/t poor functional capacity   F/U after testing        Scribe Attestation  By signing my name below, I, Tay Huitron   attest that this documentation has been prepared under the direction and in the presence of Wu Dwyer MD.

## 2024-09-12 NOTE — PROGRESS NOTES
Referred by NAA Balderrama for HTN     Counseling:  The patient was counseled regarding diagnostic results, instructions for management, risk factor reductions, prognosis, patient and family education, impressions, risks and benefits of treatment options and importance of compliance with treatment.      History Of Present Illness:    Kaveh Adkins is a 69 y.o. male patient whose PMH is significant for HTN, DM type 2, hiatal hernia and NALLELY. He presents today to establish cardiovascular care for the evaluation and management of HTN. The patient reports a longstanding history of HTN that has been progressively more difficult to manage. He reports frequent headaches. The patient also reports occasional chest discomfort and exertional SOB. He denies any LE edema or sleep disturbance, but does report daytime fatigue. The patient was diagnosed with NALLELY in the past, but has been intolerant of CPAP therapy in the past. He is currently on hydralazine 25 mg TID, metoprolol tartrate 75 mg BID and spironolactone 25 mg daily. The patient's family history is negative for HTN as far as he is aware.     Past Surgical History:  He has a past surgical history that includes Other surgical history (10/04/2021); Other surgical history (10/04/2021); Other surgical history (10/21/2022); Hernia repair (06/18/2018); Appendectomy (06/08/2024); and Abdominal surgery.      Social History:  He reports that he has been smoking cigars. He started smoking about 17 months ago. He has never used smokeless tobacco. He reports that he does not currently use alcohol. He reports that he does not use drugs.    Family History:  Family History   Problem Relation Name Age of Onset    Colon cancer Other mat. great granfather 96        Allergies:  Cephalosporins, Ciprofloxacin, Cefuroxime axetil, Doxycycline, Furosemide, and Hydrochlorothiazide    Outpatient Medications:  Current Outpatient Medications   Medication Instructions     acetaminophen (TYLENOL) 650 mg, oral, Every 6 hours PRN    aspirin 81 mg, oral, Daily    fluticasone (Flonase) 50 mcg/actuation nasal spray 2 sprays, nasal, Daily    glipiZIDE (GLUCOTROL) 10 mg, oral, Daily    hydrALAZINE (APRESOLINE) 10 mg, oral, 3 times daily    hydrALAZINE (APRESOLINE) 10 mg, oral, 3 times daily    hydrALAZINE (APRESOLINE) 25 mg, oral, 3 times daily    metoprolol tartrate (LOPRESSOR) 75 mg, oral, 2 times daily    OneTouch Ultra Test strip     OneTouch Ultra2 Meter misc     oxyCODONE (ROXICODONE) 5 mg, oral, Every 6 hours PRN    rosuvastatin (Crestor) 20 mg tablet 1 tablet, oral, Daily    spironolactone (Aldactone) 25 mg tablet 1 tablet, oral, Daily    tamsulosin (Flomax) 0.4 mg 24 hr capsule 1 capsule, oral, Daily        Last Recorded Vitals:  Vitals:    09/12/24 1150   BP: 152/88   BP Location: Left arm   Pulse: 54   Weight: 125 kg (276 lb)   Height: 1.829 m (6')       Review of Systems   Constitutional: Positive for malaise/fatigue.   Cardiovascular:  Positive for dyspnea on exertion.        Chest discomfort   Neurological:  Positive for headaches.   All other systems reviewed and are negative.     Physical Exam:  Constitutional:       Appearance: Healthy appearance. Not in distress.   Neck:      Vascular: No JVR. JVD normal.   Pulmonary:      Effort: Pulmonary effort is normal.      Breath sounds: Normal breath sounds. No wheezing. No rhonchi. No rales.   Chest:      Chest wall: Not tender to palpatation.   Cardiovascular:      PMI at left midclavicular line. Normal rate. Regular rhythm. Normal S1. Normal S2.       Murmurs: There is no murmur.      No gallop.  No click. No rub.   Pulses:     Intact distal pulses.   Edema:     Peripheral edema absent.   Abdominal:      General: Bowel sounds are normal.      Palpations: Abdomen is soft.      Tenderness: There is no abdominal tenderness.   Musculoskeletal: Normal range of motion.         General: No tenderness. Skin:     General: Skin is warm and  dry.   Neurological:      General: No focal deficit present.      Mental Status: Alert and oriented to person, place and time.            Last Labs:  CBC -  Lab Results   Component Value Date    WBC 8.1 06/11/2024    HGB 14.9 06/11/2024    HCT 44.7 06/11/2024    MCV 90 06/11/2024     (L) 06/11/2024       CMP -  Lab Results   Component Value Date    CALCIUM 7.2 (L) 06/13/2024    PHOS 2.7 06/12/2024    PROT 6.8 06/10/2024    ALBUMIN 3.7 06/10/2024    AST 13 06/10/2024    ALT 20 06/10/2024    ALKPHOS 91 06/10/2024    BILITOT 1.7 (H) 06/10/2024       LIPID PANEL -   Lab Results   Component Value Date    CHOL 97 09/17/2021    TRIG 123 09/17/2021    HDL 36.4 (A) 09/17/2021    CHHDL 2.7 09/17/2021    LDLF 36 09/17/2021    VLDL 25 09/17/2021    NHDL 73 11/30/2019       RENAL FUNCTION PANEL -   Lab Results   Component Value Date    GLUCOSE 88 06/13/2024     06/13/2024    K 3.6 06/13/2024     (H) 06/13/2024    CO2 23 06/13/2024    ANIONGAP 12 06/13/2024    BUN 25 (H) 06/13/2024    CREATININE 1.18 06/13/2024    GFRMALE 39 (A) 08/29/2022    CALCIUM 7.2 (L) 06/13/2024    PHOS 2.7 06/12/2024    ALBUMIN 3.7 06/10/2024        Lab Results   Component Value Date    HGBA1C 6.9 (A) 12/19/2021       Last Cardiology Tests:  07/21/2023 - AAA U/S  No aneurysm involving visualized portions of the abdominal aorta measuring up to 2.3 cm in greatest cross-sectional dimension proximally, 1.9 cm in the mid abdominal aorta, and 1.8 cm in greatest  cross-sectional dimension distally. Neither common iliac artery was able to be seen.    05/27/2022 - AAA U/S  1. Limited study due to underpenetration and the presence of bowel gas overlying the aorta.  2. Ectatic distal abdominal aorta, measuring up to 2.8 cm.  3. Incidentally seen oval-shaped, hypoechoic structure in the mid abdomen measuring up to 9.4 cm which is of uncertain etiology and significance. Finding could represent a large renal cyst given a large cyst was seen in the  left kidney on prior CT 04/2019. However, further evaluation with CT of the abdomen and pelvis is recommended.    09/23/2020 - CT Lung Screening  1.  Very limited study due to patient's body habitus and low-dose technique causing significant beam hardening artifact.  2. Stable 3 mm nodule in the left upper lobe. Recommend follow-up chest CT in 12 months.  3. Stable appearance of a 1.8 cm left thyroid nodule.  4. Layering fluid in the mid esophagus. Correlation with reflux or motility disorder.  5. Again seen aneurysmal dilatation of the ascending thoracic aorta measuring 4.6 cm. This is unchanged compared to prior study from 2016 which measured 4.5 cm.  6. Dilated main pulmonary artery. Correlation with pulmonary artery hypertension.  7. Mild-to-moderate coronary artery calcification. Partially imaged soft tissue in the left upper quadrant of the abdomen and anterior to the left kidney which is likely a loop of bowel. However recommend correlation with renal ultrasound to exclude renal lesion.    08/12/2019 - CT Calcium Score  1. Coronary artery calcium score of 282*.  2. 3 mm nodule in the left upper lobe. If the patient is at risk of lung malignancy, consider follow-up chest CT in 12 months.  3. Phos for dilation of the ascending thoracic aorta measuring 4.4 cm.    Lab review: I have personally reviewed the laboratory result(s).    Assessment/Plan   1) HTN  On hydralazine 25 mg TID, metoprolol tartrate 75 mg BID and spironolactone 25 mg daily  Longstanding h/o HTN   Progressively more difficult to manage  Reports frequent headaches  Denies LE edema  Fh negative for HTN   Start losartan 50 mg daily  Continue all other medications as prescribed   Check renal artery U/S  Check echo  Check cortisol, catecholamines, plasma metanephrines, aldosterone/renin activity  F/U after testing     2) Dilatation of Ascending Thoracic Aorta   CT lung screening 09/23/2020 with aneurysmal dilatation of the ascending thoracic aorta  measuring 4.6 cm; stable from [facility] study from 2016     3) Elevated CT Calcium Score  CT calcium score 08/12/2019 with total score of 282  CT lung screening 09/23/2020 with mild-to-moderate coronary artery calcification    4) NALLELY  Intolerant of CPAP  Denies sleep disturbance, but reports daytime fatigue     5) CP and Exertional SOB  Reports occasional chest discomfort and exertional SOB  Check echo  Check Lexiscan Cardiolite stress test - unable to tolerate treadmill stress test s/t poor functional capacity   F/U after testing        Scribe Attestation  By signing my name below, I, Tay Huitron   attest that this documentation has been prepared under the direction and in the presence of Wu Dwyer MD.

## 2024-09-12 NOTE — PATIENT INSTRUCTIONS
Start losartan 50 mg daily. A prescription has been sent to your pharmacy.   Continue all other medications as prescribed.  Dr. Dwyer has ordered the following tests: Ultrasound of your kidney arteries to look for any blockages there that could be contributing to your elevated blood pressure, echocardiogram (ultrasound of the heart) to evaluate your heart function and structure, as well as a stress test to ensure your heart is getting adequate blood flow and there is no evidence of any blockages within the heart arteries.    Please have blood work drawn.   Followup with Dr. Dwyer after the above tests.    If you have any questions or cardiac concerns, please call our office at [phone].

## 2024-09-24 ENCOUNTER — TELEPHONE (OUTPATIENT)
Dept: CARDIOLOGY | Facility: HOSPITAL | Age: 69
End: 2024-09-24
Payer: MEDICARE

## 2024-09-24 NOTE — TELEPHONE ENCOUNTER
9/24 3pm - Patient called with questions regarding instructions for nuc stress test 9/27. Confirmed with [facility]ez CAI that patient may take his beta blockers as prescribed as his stress test is pharmacologic. Patient is a type 2 diabetic, confirmed he may eat a light breakfast and take 1/2 dose insulin or hold dose until after test is complete. Patient verbalized understanding, no other questions at this time.

## 2024-09-26 ENCOUNTER — LAB (OUTPATIENT)
Dept: LAB | Facility: LAB | Age: 69
End: 2024-09-26
Payer: MEDICARE

## 2024-09-26 DIAGNOSIS — R07.9 CHEST PAIN: ICD-10-CM

## 2024-09-26 DIAGNOSIS — I10 UNCONTROLLED HYPERTENSION: ICD-10-CM

## 2024-09-26 PROCEDURE — 82384 ASSAY THREE CATECHOLAMINES: CPT

## 2024-09-26 PROCEDURE — 82530 CORTISOL FREE: CPT

## 2024-09-26 PROCEDURE — 36415 COLL VENOUS BLD VENIPUNCTURE: CPT

## 2024-09-26 PROCEDURE — 82088 ASSAY OF ALDOSTERONE: CPT

## 2024-09-26 PROCEDURE — 83835 ASSAY OF METANEPHRINES: CPT

## 2024-09-27 ENCOUNTER — HOSPITAL ENCOUNTER (OUTPATIENT)
Dept: RADIOLOGY | Facility: HOSPITAL | Age: 69
Discharge: HOME | End: 2024-09-27
Payer: MEDICARE

## 2024-09-27 ENCOUNTER — HOSPITAL ENCOUNTER (OUTPATIENT)
Dept: CARDIOLOGY | Facility: HOSPITAL | Age: 69
Discharge: HOME | End: 2024-09-27
Payer: MEDICARE

## 2024-09-27 ENCOUNTER — TELEPHONE (OUTPATIENT)
Dept: CARDIOLOGY | Facility: HOSPITAL | Age: 69
End: 2024-09-27

## 2024-09-27 DIAGNOSIS — I10 UNCONTROLLED HYPERTENSION: ICD-10-CM

## 2024-09-27 DIAGNOSIS — R07.9 CHEST PAIN: ICD-10-CM

## 2024-09-27 PROCEDURE — A9502 TC99M TETROFOSMIN: HCPCS | Performed by: STUDENT IN AN ORGANIZED HEALTH CARE EDUCATION/TRAINING PROGRAM

## 2024-09-27 PROCEDURE — 93306 TTE W/DOPPLER COMPLETE: CPT | Performed by: STUDENT IN AN ORGANIZED HEALTH CARE EDUCATION/TRAINING PROGRAM

## 2024-09-27 PROCEDURE — 93356 MYOCRD STRAIN IMG SPCKL TRCK: CPT | Performed by: STUDENT IN AN ORGANIZED HEALTH CARE EDUCATION/TRAINING PROGRAM

## 2024-09-27 PROCEDURE — 93018 CV STRESS TEST I&R ONLY: CPT | Performed by: STUDENT IN AN ORGANIZED HEALTH CARE EDUCATION/TRAINING PROGRAM

## 2024-09-27 PROCEDURE — 93306 TTE W/DOPPLER COMPLETE: CPT

## 2024-09-27 PROCEDURE — 93017 CV STRESS TEST TRACING ONLY: CPT

## 2024-09-27 PROCEDURE — 3430000001 HC RX 343 DIAGNOSTIC RADIOPHARMACEUTICALS: Performed by: INTERNAL MEDICINE

## 2024-09-27 PROCEDURE — 3430000001 HC RX 343 DIAGNOSTIC RADIOPHARMACEUTICALS: Performed by: STUDENT IN AN ORGANIZED HEALTH CARE EDUCATION/TRAINING PROGRAM

## 2024-09-27 PROCEDURE — 2500000004 HC RX 250 GENERAL PHARMACY W/ HCPCS (ALT 636 FOR OP/ED): Performed by: INTERNAL MEDICINE

## 2024-09-27 PROCEDURE — A9502 TC99M TETROFOSMIN: HCPCS | Performed by: INTERNAL MEDICINE

## 2024-09-27 PROCEDURE — 93016 CV STRESS TEST SUPVJ ONLY: CPT | Performed by: STUDENT IN AN ORGANIZED HEALTH CARE EDUCATION/TRAINING PROGRAM

## 2024-09-27 PROCEDURE — 78452 HT MUSCLE IMAGE SPECT MULT: CPT

## 2024-09-27 RX ORDER — REGADENOSON 0.08 MG/ML
0.4 INJECTION, SOLUTION INTRAVENOUS
Status: COMPLETED | OUTPATIENT
Start: 2024-09-27 | End: 2024-09-27

## 2024-09-29 LAB
AORTIC VALVE MEAN GRADIENT: 3 MMHG
AVA (VTI): 2.7 CM2
EJECTION FRACTION APICAL 4 CHAMBER: 73.2
EJECTION FRACTION: 63 %
LEFT ATRIUM VOLUME AREA LENGTH INDEX BSA: 24.5 ML/M2
LEFT VENTRICLE INTERNAL DIMENSION DIASTOLE: 5.9 CM (ref 3.5–6)
LEFT VENTRICULAR OUTFLOW TRACT DIAMETER: 2 CM
LV EJECTION FRACTION BIPLANE: 68 %
MITRAL VALVE E/A RATIO: 0.56
MITRAL VALVE E/E' RATIO: 7.2
RIGHT VENTRICLE FREE WALL PEAK S': 11.3 CM/S
TRICUSPID ANNULAR PLANE SYSTOLIC EXCURSION: 2.8 CM

## 2024-09-30 ENCOUNTER — TELEPHONE (OUTPATIENT)
Dept: CARDIOLOGY | Facility: HOSPITAL | Age: 69
End: 2024-09-30
Payer: MEDICARE

## 2024-09-30 DIAGNOSIS — I71.20 THORACIC AORTIC ANEURYSM (TAA), UNSPECIFIED PART, UNSPECIFIED WHETHER RUPTURED (CMS-HCC): ICD-10-CM

## 2024-09-30 DIAGNOSIS — N18.31 STAGE 3A CHRONIC KIDNEY DISEASE (MULTI): Primary | ICD-10-CM

## 2024-09-30 DIAGNOSIS — R94.39 ABNORMAL STRESS TEST: ICD-10-CM

## 2024-09-30 LAB
ALDOST SERPL-MCNC: 13.3 NG/DL
ALDOST/RENIN PLAS-RTO: NORMAL RATIO
ANNOTATION COMMENT IMP: NORMAL
CORTIS F SERPL-MCNC: 0.58 UG/DL
METANEPHS SERPL-SCNC: 0.33 NMOL/L (ref 0–0.49)
NORMETANEPH FREE SERPL-SCNC: 0.73 NMOL/L (ref 0–0.89)
RENIN PLAS-CCNC: <0.1 NG/ML/HR

## 2024-09-30 NOTE — TELEPHONE ENCOUNTER
RN called pt at this time. Reviewed results and plan at this time. RN placed orders. Pt verbalized understanding.         ----- Message from Wu Dwyer sent at 9/28/2024 11:34 AM EDT -----  Regarding: FW: Abnormal nuclear stress test and Ascending aorta 5.2 in size  Set him up for a CT angio of chest for thoracic aortic aneurysm and a Left heart cath after that  ----- Message -----  From: YIN Church-PEDRO PABLO  Sent: 9/27/2024   6:04 PM EDT  To: Cony Mendoza RN; Wu Dwyer MD; #  Subject: Abnormal nuclear stress test and Ascending a#    Hi,     Dr. Leon wanted me to let you know his nuclear stress test is abnormal. His ascending thoracic aorta is measuring 5.2 as well. Last CT scan in 2020 showed aorta size of 4.6. I did try to call the patient with results but he did not answer. I left him a message. He has a follow up already scheduled with you on 10/15/24 -just shy of three weeks from now.     Cierra

## 2024-10-01 PROBLEM — R94.39 ABNORMAL STRESS TEST: Status: ACTIVE | Noted: 2024-09-30

## 2024-10-02 ENCOUNTER — LAB (OUTPATIENT)
Dept: LAB | Facility: LAB | Age: 69
End: 2024-10-02
Payer: MEDICARE

## 2024-10-02 DIAGNOSIS — R94.39 ABNORMAL STRESS TEST: ICD-10-CM

## 2024-10-02 DIAGNOSIS — N18.31 STAGE 3A CHRONIC KIDNEY DISEASE (MULTI): ICD-10-CM

## 2024-10-02 LAB
ANION GAP SERPL CALC-SCNC: 11 MMOL/L (ref 10–20)
BUN SERPL-MCNC: 20 MG/DL (ref 6–23)
CALCIUM SERPL-MCNC: 8.9 MG/DL (ref 8.6–10.3)
CHLORIDE SERPL-SCNC: 108 MMOL/L (ref 98–107)
CO2 SERPL-SCNC: 26 MMOL/L (ref 21–32)
CREAT SERPL-MCNC: 1.32 MG/DL (ref 0.5–1.3)
EGFRCR SERPLBLD CKD-EPI 2021: 59 ML/MIN/1.73M*2
ERYTHROCYTE [DISTWIDTH] IN BLOOD BY AUTOMATED COUNT: 14.1 % (ref 11.5–14.5)
GLUCOSE SERPL-MCNC: 113 MG/DL (ref 74–99)
HCT VFR BLD AUTO: 48.7 % (ref 41–52)
HGB BLD-MCNC: 15.8 G/DL (ref 13.5–17.5)
MCH RBC QN AUTO: 29.8 PG (ref 26–34)
MCHC RBC AUTO-ENTMCNC: 32.4 G/DL (ref 32–36)
MCV RBC AUTO: 92 FL (ref 80–100)
NRBC BLD-RTO: 0 /100 WBCS (ref 0–0)
PLATELET # BLD AUTO: 136 X10*3/UL (ref 150–450)
POTASSIUM SERPL-SCNC: 4.2 MMOL/L (ref 3.5–5.3)
RBC # BLD AUTO: 5.3 X10*6/UL (ref 4.5–5.9)
SODIUM SERPL-SCNC: 141 MMOL/L (ref 136–145)
WBC # BLD AUTO: 8.3 X10*3/UL (ref 4.4–11.3)

## 2024-10-02 PROCEDURE — 85027 COMPLETE CBC AUTOMATED: CPT

## 2024-10-02 PROCEDURE — 80048 BASIC METABOLIC PNL TOTAL CA: CPT

## 2024-10-02 PROCEDURE — 36415 COLL VENOUS BLD VENIPUNCTURE: CPT

## 2024-10-03 ENCOUNTER — TELEPHONE (OUTPATIENT)
Dept: CARDIOLOGY | Facility: HOSPITAL | Age: 69
End: 2024-10-03

## 2024-10-03 ENCOUNTER — ANCILLARY PROCEDURE (OUTPATIENT)
Dept: CARDIOLOGY | Facility: HOSPITAL | Age: 69
End: 2024-10-03
Payer: MEDICARE

## 2024-10-03 VITALS
SYSTOLIC BLOOD PRESSURE: 152 MMHG | HEART RATE: 50 BPM | HEIGHT: 72 IN | DIASTOLIC BLOOD PRESSURE: 84 MMHG | BODY MASS INDEX: 36.76 KG/M2 | WEIGHT: 271.4 LBS

## 2024-10-03 DIAGNOSIS — R94.39 ABNORMAL STRESS TEST: ICD-10-CM

## 2024-10-03 PROCEDURE — 93005 ELECTROCARDIOGRAM TRACING: CPT | Performed by: INTERNAL MEDICINE

## 2024-10-03 PROCEDURE — 93005 ELECTROCARDIOGRAM TRACING: CPT

## 2024-10-03 PROCEDURE — 93010 ELECTROCARDIOGRAM REPORT: CPT | Performed by: INTERNAL MEDICINE

## 2024-10-03 NOTE — TELEPHONE ENCOUNTER
Called cath instructions to patient including review of date and arrival time.  Verified no need for dye prep.  Labs reviewed.  Nothing to eat or drink after midnight except Metoprolol/ASA with a sip of water the morning of the cath.  You will need a .  Bring your ID, insurance cards and either a perfect list of the medications you are swallowing or the medications in original bottles.  Wear comfortable clothing.  There is a possibility of staying over night for observation so pack a bag with necessities for that.  Patient correctly repeated instructions, verbalized understanding and is agreeable to the plan.       ----- Message from Stanley NOLASCO sent at 10/1/2024 11:57 AM EDT -----  Regarding: PATIENT SCHEDULED  Scheduled 10/14 arrival time 7:30 and start time 8:30, he is coming in this Thurs for EKG and getting labs done.     Please call patient back! Thank you!  ----- Message -----  From: Cony Mendoza RN  Sent: 9/30/2024   4:29 PM EDT  To: Stanley Nathan  Subject: FW: Abnormal nuclear stress test and Ascendi#    Pt is calling today to schedule his CT and he is going to need a cath after his CT scan and EKG. Let me know when I will call him.  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 His ascending thoracic aorta is measuring 5.2 as well. Last CT scan in 2020 showed aorta size of 4.6. I did try to call the patient with results but he did not answer. I left him a message. He has a follow up already scheduled with you on 10/15/24 -just shy of  three weeks from now.     Cierra

## 2024-10-04 ENCOUNTER — TELEPHONE (OUTPATIENT)
Dept: CARDIOLOGY | Facility: HOSPITAL | Age: 69
End: 2024-10-04
Payer: MEDICARE

## 2024-10-04 NOTE — TELEPHONE ENCOUNTER
RN called pt at this time regarding upcoming cath time and date. RN changed time to arrival at 0730 and procedure at 1030. Pt verbalized understanding,         ----- Message from Marilee Gonzales sent at 10/3/2024  4:53 PM EDT -----  Regarding: RE: Cath on the 10/14  Yes he will  ----- Message -----  From: Cony Mendoza RN  Sent: 10/3/2024   3:30 PM EDT  To: YIN Church-CNP  Subject: Cath on the 10/14                                Will he need hydration?

## 2024-10-07 ENCOUNTER — HOSPITAL ENCOUNTER (OUTPATIENT)
Dept: VASCULAR MEDICINE | Facility: HOSPITAL | Age: 69
Discharge: HOME | End: 2024-10-07
Payer: MEDICARE

## 2024-10-07 DIAGNOSIS — I10 UNCONTROLLED HYPERTENSION: ICD-10-CM

## 2024-10-07 DIAGNOSIS — R07.9 CHEST PAIN: ICD-10-CM

## 2024-10-07 PROCEDURE — 93975 VASCULAR STUDY: CPT | Performed by: INTERNAL MEDICINE

## 2024-10-07 PROCEDURE — 93975 VASCULAR STUDY: CPT

## 2024-10-08 LAB
DOPAMINE SERPL-MCNC: 33 PG/ML (ref 0–48)
EPINEPH PLAS-MCNC: 67 PG/ML (ref 0–62)
NOREPINEPH PLAS-MCNC: 635 PG/ML (ref 0–874)

## 2024-10-11 ENCOUNTER — TELEPHONE (OUTPATIENT)
Dept: CARDIOLOGY | Facility: HOSPITAL | Age: 69
End: 2024-10-11
Payer: MEDICARE

## 2024-10-11 NOTE — TELEPHONE ENCOUNTER
RN called pt at this time regarding changing his date for cath to follow his CT scan. No answer at this time. RN left message to have patient call back.

## 2024-10-15 ENCOUNTER — HOSPITAL ENCOUNTER (OUTPATIENT)
Dept: RADIOLOGY | Facility: HOSPITAL | Age: 69
Discharge: HOME | End: 2024-10-15
Payer: MEDICARE

## 2024-10-15 ENCOUNTER — APPOINTMENT (OUTPATIENT)
Dept: CARDIOLOGY | Facility: HOSPITAL | Age: 69
End: 2024-10-15
Payer: MEDICARE

## 2024-10-15 DIAGNOSIS — I71.20 THORACIC AORTIC ANEURYSM (TAA), UNSPECIFIED PART, UNSPECIFIED WHETHER RUPTURED (CMS-HCC): ICD-10-CM

## 2024-10-15 DIAGNOSIS — N18.31 STAGE 3A CHRONIC KIDNEY DISEASE (MULTI): ICD-10-CM

## 2024-10-15 PROCEDURE — 2550000001 HC RX 255 CONTRASTS: Performed by: INTERNAL MEDICINE

## 2024-10-15 PROCEDURE — 71275 CT ANGIOGRAPHY CHEST: CPT | Performed by: RADIOLOGY

## 2024-10-15 PROCEDURE — 71275 CT ANGIOGRAPHY CHEST: CPT

## 2024-10-21 ENCOUNTER — TELEPHONE (OUTPATIENT)
Dept: CARDIOLOGY | Facility: HOSPITAL | Age: 69
End: 2024-10-21
Payer: MEDICARE

## 2024-10-21 ENCOUNTER — APPOINTMENT (OUTPATIENT)
Dept: CARDIOLOGY | Facility: HOSPITAL | Age: 69
End: 2024-10-21
Payer: MEDICARE

## 2024-10-21 ENCOUNTER — HOSPITAL ENCOUNTER (EMERGENCY)
Facility: HOSPITAL | Age: 69
Discharge: HOME | End: 2024-10-21
Attending: STUDENT IN AN ORGANIZED HEALTH CARE EDUCATION/TRAINING PROGRAM
Payer: MEDICARE

## 2024-10-21 ENCOUNTER — APPOINTMENT (OUTPATIENT)
Dept: RADIOLOGY | Facility: HOSPITAL | Age: 69
End: 2024-10-21
Payer: MEDICARE

## 2024-10-21 VITALS
RESPIRATION RATE: 16 BRPM | SYSTOLIC BLOOD PRESSURE: 173 MMHG | DIASTOLIC BLOOD PRESSURE: 84 MMHG | TEMPERATURE: 98.7 F | WEIGHT: 270 LBS | HEIGHT: 72 IN | OXYGEN SATURATION: 96 % | BODY MASS INDEX: 36.57 KG/M2 | HEART RATE: 51 BPM

## 2024-10-21 DIAGNOSIS — R10.12 LEFT UPPER QUADRANT ABDOMINAL PAIN: ICD-10-CM

## 2024-10-21 DIAGNOSIS — I10 HYPERTENSION, UNSPECIFIED TYPE: ICD-10-CM

## 2024-10-21 DIAGNOSIS — R51.9 ACUTE NONINTRACTABLE HEADACHE, UNSPECIFIED HEADACHE TYPE: Primary | ICD-10-CM

## 2024-10-21 LAB
ALBUMIN SERPL BCP-MCNC: 4 G/DL (ref 3.4–5)
ALP SERPL-CCNC: 114 U/L (ref 33–136)
ALT SERPL W P-5'-P-CCNC: 7 U/L (ref 10–52)
ANION GAP SERPL CALC-SCNC: 9 MMOL/L (ref 10–20)
AST SERPL W P-5'-P-CCNC: 12 U/L (ref 9–39)
BASOPHILS # BLD AUTO: 0.04 X10*3/UL (ref 0–0.1)
BASOPHILS NFR BLD AUTO: 0.5 %
BILIRUB DIRECT SERPL-MCNC: 0.1 MG/DL (ref 0–0.3)
BILIRUB SERPL-MCNC: 0.8 MG/DL (ref 0–1.2)
BUN SERPL-MCNC: 16 MG/DL (ref 6–23)
CALCIUM SERPL-MCNC: 8.7 MG/DL (ref 8.6–10.3)
CARDIAC TROPONIN I PNL SERPL HS: 10 NG/L (ref 0–20)
CHLORIDE SERPL-SCNC: 108 MMOL/L (ref 98–107)
CO2 SERPL-SCNC: 26 MMOL/L (ref 21–32)
CREAT SERPL-MCNC: 1.36 MG/DL (ref 0.5–1.3)
EGFRCR SERPLBLD CKD-EPI 2021: 57 ML/MIN/1.73M*2
EOSINOPHIL # BLD AUTO: 0.17 X10*3/UL (ref 0–0.7)
EOSINOPHIL NFR BLD AUTO: 2.1 %
ERYTHROCYTE [DISTWIDTH] IN BLOOD BY AUTOMATED COUNT: 13.5 % (ref 11.5–14.5)
GLUCOSE SERPL-MCNC: 152 MG/DL (ref 74–99)
HCT VFR BLD AUTO: 49.2 % (ref 41–52)
HGB BLD-MCNC: 16 G/DL (ref 13.5–17.5)
IMM GRANULOCYTES # BLD AUTO: 0.02 X10*3/UL (ref 0–0.7)
IMM GRANULOCYTES NFR BLD AUTO: 0.2 % (ref 0–0.9)
LACTATE SERPL-SCNC: 0.9 MMOL/L (ref 0.4–2)
LIPASE SERPL-CCNC: 53 U/L (ref 9–82)
LYMPHOCYTES # BLD AUTO: 1.72 X10*3/UL (ref 1.2–4.8)
LYMPHOCYTES NFR BLD AUTO: 20.9 %
MCH RBC QN AUTO: 30 PG (ref 26–34)
MCHC RBC AUTO-ENTMCNC: 32.5 G/DL (ref 32–36)
MCV RBC AUTO: 92 FL (ref 80–100)
MONOCYTES # BLD AUTO: 0.49 X10*3/UL (ref 0.1–1)
MONOCYTES NFR BLD AUTO: 5.9 %
NEUTROPHILS # BLD AUTO: 5.8 X10*3/UL (ref 1.2–7.7)
NEUTROPHILS NFR BLD AUTO: 70.4 %
NRBC BLD-RTO: 0 /100 WBCS (ref 0–0)
PLATELET # BLD AUTO: 132 X10*3/UL (ref 150–450)
POTASSIUM SERPL-SCNC: 4.3 MMOL/L (ref 3.5–5.3)
PROT SERPL-MCNC: 7 G/DL (ref 6.4–8.2)
RBC # BLD AUTO: 5.34 X10*6/UL (ref 4.5–5.9)
SODIUM SERPL-SCNC: 139 MMOL/L (ref 136–145)
WBC # BLD AUTO: 8.2 X10*3/UL (ref 4.4–11.3)

## 2024-10-21 PROCEDURE — 84484 ASSAY OF TROPONIN QUANT: CPT | Performed by: STUDENT IN AN ORGANIZED HEALTH CARE EDUCATION/TRAINING PROGRAM

## 2024-10-21 PROCEDURE — 96374 THER/PROPH/DIAG INJ IV PUSH: CPT | Mod: 59

## 2024-10-21 PROCEDURE — 80048 BASIC METABOLIC PNL TOTAL CA: CPT | Performed by: STUDENT IN AN ORGANIZED HEALTH CARE EDUCATION/TRAINING PROGRAM

## 2024-10-21 PROCEDURE — 82248 BILIRUBIN DIRECT: CPT | Performed by: STUDENT IN AN ORGANIZED HEALTH CARE EDUCATION/TRAINING PROGRAM

## 2024-10-21 PROCEDURE — 99285 EMERGENCY DEPT VISIT HI MDM: CPT | Mod: 25

## 2024-10-21 PROCEDURE — 2500000001 HC RX 250 WO HCPCS SELF ADMINISTERED DRUGS (ALT 637 FOR MEDICARE OP): Performed by: STUDENT IN AN ORGANIZED HEALTH CARE EDUCATION/TRAINING PROGRAM

## 2024-10-21 PROCEDURE — 83690 ASSAY OF LIPASE: CPT | Performed by: STUDENT IN AN ORGANIZED HEALTH CARE EDUCATION/TRAINING PROGRAM

## 2024-10-21 PROCEDURE — 74177 CT ABD & PELVIS W/CONTRAST: CPT

## 2024-10-21 PROCEDURE — 83605 ASSAY OF LACTIC ACID: CPT | Performed by: STUDENT IN AN ORGANIZED HEALTH CARE EDUCATION/TRAINING PROGRAM

## 2024-10-21 PROCEDURE — 2550000001 HC RX 255 CONTRASTS: Performed by: STUDENT IN AN ORGANIZED HEALTH CARE EDUCATION/TRAINING PROGRAM

## 2024-10-21 PROCEDURE — 36415 COLL VENOUS BLD VENIPUNCTURE: CPT | Performed by: STUDENT IN AN ORGANIZED HEALTH CARE EDUCATION/TRAINING PROGRAM

## 2024-10-21 PROCEDURE — 93005 ELECTROCARDIOGRAM TRACING: CPT

## 2024-10-21 PROCEDURE — 74177 CT ABD & PELVIS W/CONTRAST: CPT | Performed by: RADIOLOGY

## 2024-10-21 PROCEDURE — 85025 COMPLETE CBC W/AUTO DIFF WBC: CPT | Performed by: STUDENT IN AN ORGANIZED HEALTH CARE EDUCATION/TRAINING PROGRAM

## 2024-10-21 PROCEDURE — 2500000004 HC RX 250 GENERAL PHARMACY W/ HCPCS (ALT 636 FOR OP/ED): Performed by: STUDENT IN AN ORGANIZED HEALTH CARE EDUCATION/TRAINING PROGRAM

## 2024-10-21 RX ORDER — DICYCLOMINE HYDROCHLORIDE 20 MG/1
20 TABLET ORAL 4 TIMES DAILY PRN
Qty: 20 TABLET | Refills: 0 | Status: SHIPPED | OUTPATIENT
Start: 2024-10-21 | End: 2024-10-31

## 2024-10-21 RX ORDER — DIPHENHYDRAMINE HCL 25 MG
25 CAPSULE ORAL ONCE
Status: COMPLETED | OUTPATIENT
Start: 2024-10-21 | End: 2024-10-21

## 2024-10-21 RX ORDER — SIMETHICONE 125 MG
125 TABLET,CHEWABLE ORAL EVERY 6 HOURS PRN
Qty: 30 TABLET | Refills: 0 | Status: SHIPPED | OUTPATIENT
Start: 2024-10-21 | End: 2024-10-31

## 2024-10-21 RX ORDER — ACETAMINOPHEN 325 MG/1
975 TABLET ORAL ONCE
Status: COMPLETED | OUTPATIENT
Start: 2024-10-21 | End: 2024-10-21

## 2024-10-21 RX ORDER — METOCLOPRAMIDE HYDROCHLORIDE 5 MG/ML
10 INJECTION INTRAMUSCULAR; INTRAVENOUS ONCE
Status: COMPLETED | OUTPATIENT
Start: 2024-10-21 | End: 2024-10-21

## 2024-10-21 ASSESSMENT — COLUMBIA-SUICIDE SEVERITY RATING SCALE - C-SSRS
2. HAVE YOU ACTUALLY HAD ANY THOUGHTS OF KILLING YOURSELF?: NO
6. HAVE YOU EVER DONE ANYTHING, STARTED TO DO ANYTHING, OR PREPARED TO DO ANYTHING TO END YOUR LIFE?: NO
1. IN THE PAST MONTH, HAVE YOU WISHED YOU WERE DEAD OR WISHED YOU COULD GO TO SLEEP AND NOT WAKE UP?: NO

## 2024-10-21 ASSESSMENT — PAIN DESCRIPTION - ORIENTATION: ORIENTATION: LEFT;UPPER

## 2024-10-21 ASSESSMENT — LIFESTYLE VARIABLES
TOTAL SCORE: 0
HAVE PEOPLE ANNOYED YOU BY CRITICIZING YOUR DRINKING: NO
HAVE YOU EVER FELT YOU SHOULD CUT DOWN ON YOUR DRINKING: NO
EVER FELT BAD OR GUILTY ABOUT YOUR DRINKING: NO
EVER HAD A DRINK FIRST THING IN THE MORNING TO STEADY YOUR NERVES TO GET RID OF A HANGOVER: NO

## 2024-10-21 ASSESSMENT — PAIN DESCRIPTION - FREQUENCY: FREQUENCY: INTERMITTENT

## 2024-10-21 ASSESSMENT — PAIN SCALES - GENERAL
PAINLEVEL_OUTOF10: 5 - MODERATE PAIN
PAINLEVEL_OUTOF10: 3
PAINLEVEL_OUTOF10: 0 - NO PAIN

## 2024-10-21 ASSESSMENT — PAIN - FUNCTIONAL ASSESSMENT
PAIN_FUNCTIONAL_ASSESSMENT: 0-10
PAIN_FUNCTIONAL_ASSESSMENT: 0-10

## 2024-10-21 ASSESSMENT — PAIN DESCRIPTION - PROGRESSION: CLINICAL_PROGRESSION: GRADUALLY IMPROVING

## 2024-10-21 ASSESSMENT — PAIN DESCRIPTION - DESCRIPTORS: DESCRIPTORS: SHARP

## 2024-10-21 ASSESSMENT — PAIN DESCRIPTION - PAIN TYPE: TYPE: ACUTE PAIN

## 2024-10-21 NOTE — TELEPHONE ENCOUNTER
RN called the patient at this time regarding his appointment tomorrow. Per Dr. Dwyer he wants to follow up with him after his cath. Heart cath is still pending at this time and pt is having active CP daily. RN notified Dr. Dwyer and advised pt go to the ER for possible admission per Dr. Dwyer. Pt verablized understanding and wants to notified his wife first and let us know.     Pt called back into office that he is going to the ER, RN notified Dr. Dwyer right away.

## 2024-10-21 NOTE — ED PROVIDER NOTES
HPI   Chief Complaint   Patient presents with    hypertension/ LUQ pain       HPI: Patient is a 69-year-old male, history of an aortic aneurysm, hypertension, hyperlipidemia, type 2 diabetes, he is presenting to the emergency department for multiple concerns.  He reports he has been having left upper quadrant abdominal pain, reports it feels like gaseous distention, associated with nausea and diarrhea for the past 3 days.  He also noticed today that he was having a headache, left side of the head, throbbing in nature, not associated with lightheadedness, dizziness, blurry or double vision.  No chest pain or palpitations, no shortness of breath during these episodes of headache and left upper quadrant abdominal pain.  He denies any recent trauma.  He denies any blood in his stool.  He he does report that he noticed his blood pressure was elevated today and so came into the emergency department for further evaluation.      ROS: Complete 12 point review of systems performed, otherwise negative except as noted in the history of present illness    PMH: Reviewed, documented below in note. Pertinents in HPI  PSH: Reviewed and documented below in note. Pertinents in HPI  SH: No illicits, not homeless  Fam: Reviewed, noncontributory to patients current complaint  MEDS: Reviewed and documented below in note. Pertinents in HPI  ALLERGIES: Reviewed and documented below in note.            History provided by:  Patient   used: No                          [place] Coma Scale Score: 15                  Patient History   Past Medical History:   Diagnosis Date    Aortic aneurysm of unspecified site, without rupture (CMS-HCC)     Aneurysm of descending aorta    Cancer (Multi)     Essential (primary) hypertension 12/04/2013    Benign essential hypertension    Personal history of malignant neoplasm, unspecified     History of malignant neoplasm    Personal history of other diseases of the circulatory system      History of hypertension    Personal history of other endocrine, nutritional and metabolic disease     History of high cholesterol    Personal history of other specified conditions 06/18/2018    History of gross hematuria    Type 2 diabetes mellitus without complications (Multi) 12/04/2013    Diabetes mellitus     Past Surgical History:   Procedure Laterality Date    ABDOMINAL SURGERY      APPENDECTOMY  06/08/2024    HERNIA REPAIR  06/18/2018    Hernia Repair    OTHER SURGICAL HISTORY  10/04/2021    Gallbladder surgery    OTHER SURGICAL HISTORY  10/04/2021    Abdominal surgery    OTHER SURGICAL HISTORY  10/21/2022    Small bowel resection     Family History   Problem Relation Name Age of Onset    Colon cancer Other mat. great granfather 96     Social History     Tobacco Use    Smoking status: Some Days     Types: Cigars     Start date: 4/3/2023    Smokeless tobacco: Never   Vaping Use    Vaping status: Never Used   Substance Use Topics    Alcohol use: Not Currently    Drug use: Never       Physical Exam   Visit Vitals  /64   Pulse 53   Temp 37.1 °C (98.7 °F) (Temporal)   Resp 18   Ht 1.829 m (6')   Wt 122 kg (270 lb)   SpO2 98%   BMI 36.62 kg/m²   Smoking Status Some Days   BSA 2.49 m²      Physical Exam  Vitals and nursing note reviewed.   Constitutional:       Appearance: Normal appearance.   HENT:      Head: Normocephalic and atraumatic.      Mouth/Throat:      Mouth: Mucous membranes are moist.      Pharynx: Oropharynx is clear.   Eyes:      Extraocular Movements: Extraocular movements intact.      Pupils: Pupils are equal, round, and reactive to light.   Neck:      Vascular: No carotid bruit.   Cardiovascular:      Rate and Rhythm: Normal rate and regular rhythm.      Pulses: Normal pulses.      Heart sounds: Normal heart sounds.   Pulmonary:      Effort: Pulmonary effort is normal.      Breath sounds: Normal breath sounds.   Abdominal:      General: There is distension.      Palpations: Abdomen is soft.       Tenderness: There is abdominal tenderness. There is no right CVA tenderness, left CVA tenderness, guarding or rebound.      Comments: TTP in the LUQ. Distension noted with decreased bowel sounds   Musculoskeletal:         General: No tenderness, deformity or signs of injury.      Cervical back: Normal range of motion. No rigidity.      Right lower leg: No edema.      Left lower leg: No edema.   Skin:     General: Skin is warm and dry.      Capillary Refill: Capillary refill takes less than 2 seconds.   Neurological:      General: No focal deficit present.      Mental Status: He is alert and oriented to person, place, and time.      Sensory: No sensory deficit.      Motor: No weakness.   Psychiatric:         Mood and Affect: Mood normal.         Behavior: Behavior normal.         CT abdomen pelvis w IV contrast   Final Result   No acute abdominal or pelvic process.        Redemonstration of a heterogeneous 5.2 cm masslike region and   stranding in the right kidney which may relate to focal fat   necrosis/posttreatment/ablation changes. Correlate with history for   the need for continued follow-up.        Postsurgical changes of partial bowel resection and anastomosis in   the left mid abdomen. No evidence for bowel obstruction.        Additional findings as described above.        MACRO:   None        Signed by: Moni Owen 10/21/2024 9:04 PM   Dictation workstation:   [id number]          Labs Reviewed   CBC WITH AUTO DIFFERENTIAL - Abnormal       Result Value    WBC 8.2      nRBC 0.0      RBC 5.34      Hemoglobin 16.0      Hematocrit 49.2      MCV 92      MCH 30.0      MCHC 32.5      RDW 13.5      Platelets 132 (*)     Neutrophils % 70.4      Immature Granulocytes %, Automated 0.2      Lymphocytes % 20.9      Monocytes % 5.9      Eosinophils % 2.1      Basophils % 0.5      Neutrophils Absolute 5.80      Immature Granulocytes Absolute, Automated 0.02      Lymphocytes Absolute 1.72      Monocytes Absolute  0.49      Eosinophils Absolute 0.17      Basophils Absolute 0.04     BASIC METABOLIC PANEL - Abnormal    Glucose 152 (*)     Sodium 139      Potassium 4.3      Chloride 108 (*)     Bicarbonate 26      Anion Gap 9 (*)     Urea Nitrogen 16      Creatinine 1.36 (*)     eGFR 57 (*)     Calcium 8.7     HEPATIC FUNCTION PANEL - Abnormal    Albumin 4.0      Bilirubin, Total 0.8      Bilirubin, Direct 0.1      Alkaline Phosphatase 114      ALT 7 (*)     AST 12      Total Protein 7.0     LIPASE - Normal    Lipase 53      Narrative:     Venipuncture immediately after or during the administration of Metamizole may lead to falsely low results. Testing should be performed immediately prior to Metamizole dosing.   LACTATE - Normal    Lactate 0.9      Narrative:     Venipuncture immediately after or during the administration of Metamizole may lead to falsely low results. Testing should be performed immediately prior to Metamizole dosing.   TROPONIN I, HIGH SENSITIVITY - Normal    Troponin I, High Sensitivity 10      Narrative:     Less than 99th percentile of normal range cutoff-  Female and children under 18 years old <14 ng/L; Male <21 ng/L: Negative  Repeat testing should be performed if clinically indicated.     Female and children under 18 years old 14-50 ng/L; Male 21-50 ng/L:  Consistent with possible cardiac damage and possible increased clinical   risk. Serial measurements may help to assess extent of myocardial damage.     >50 ng/L: Consistent with cardiac damage, increased clinical risk and  myocardial infarction. Serial measurements may help assess extent of   myocardial damage.      NOTE: Children less than 1 year old may have higher baseline troponin   levels and results should be interpreted in conjunction with the overall   clinical context.     NOTE: Troponin I testing is performed using a different   testing methodology at [facility] than at other   [facility] hospitals. Direct result comparisons should  only   be made within the same method.         ED Course & MDM   Diagnoses as of 10/21/24 2134   Acute nonintractable headache, unspecified headache type   Hypertension, unspecified type   Left upper quadrant abdominal pain           Medical Decision Making  All mentioned lab results, ECGs, and imaging were independently reviewed by myself  - Patient evaluated. Patient is presenting to the emergency department for multiple concerns.  Left upper quadrant pain, associated with nausea and diarrhea, headache, and hypertension.  Differential for the patient's left upper quadrant pain includes but is not limited to pancreatitis, colitis, gastritis, peptic ulcer disease, potential constipation or obstruction, or diverticulitis to name a few.  Patient's abdominal pain could be contributing to the patient's hypertension as could the patient's headache, the hypertension could also be contributing to the patient's headache as well.  He has no red flag signs or symptoms on history or examination concerning for his headache of an intracranial hemorrhage or fracture and while imaging was considered I do not believe is clinically indicated given his normal neuroexam.  We will treat the patient's pain first and evaluate the blood pressure to see if this improves.  Patient was given a migraine cocktail with Tylenol Benadryl and Reglan and basic labs as well as a CT scan of the abdomen and pelvis was ordered.  Labs are consistent with a chronic kidney disease, stable creatinine.  Normal liver and pancreatic enzymes, no leukocytosis or anemia.  Cardiac enzymes are within normal limits, patient's EKG demonstrates sinus bradycardia with a rate of 54, left axis deviation with a left anterior fascicular block noted, no evidence of an acute STEMI.  CT scan of the abdomen pelvis with IV contrast demonstrates redemonstration of a 5.2 cm masslike region and stranding in the right kidney as well as a postsurgical changes of partial bowel  resection and anastomosis in the left mid abdomen with no evidence of obstruction.  I discussed the findings of the patient's CT with the patient, on reevaluation his hypertension has improved and his pain is improved as well.  Due to this I feel he is stable for discharge with close outpatient follow-up for continued management of his hypertension on an outpatient basis.  Patient was given prescription therapy that was sent to his pharmacy to take as needed for his abdominal pain.  Patient is discharged with strict return precautions.    - Monitored for any changes in stability or symptomatology. Patient remained stable.   - Counseled regarding labs, imaging, diagnosis, and plan. Patient was agreeable. All questions were answered. The patient was receptive and agreeable to the plan of care.   -The patient was instructed to return to the emergency department if any symptoms recurred, worsened, or if there were any additional concerns.    *Disclaimer: This note was dictated by speech recognition. Minor errors in transcription may be present. Please call with questions.    Franklin Gonzales MD             Your medication list        START taking these medications        Instructions Last Dose Given Next Dose Due   dicyclomine 20 mg tablet  Commonly known as: Bentyl      Take 1 tablet (20 mg) by mouth 4 times a day as needed (abdominal cramping) for up to 10 days.       simethicone 125 mg chewable tablet  Commonly known as: Mylicon      Chew 1 tablet (125 mg) every 6 hours if needed for flatulence for up to 10 days.              CONTINUE taking these medications        Instructions Last Dose Given Next Dose Due   OneTouch Ultra2 Meter misc  Generic drug: blood-glucose meter                  ASK your doctor about these medications        Instructions Last Dose Given Next Dose Due   acetaminophen 325 mg tablet  Commonly known as: TylenoL      Take 2 tablets (650 mg) by mouth every 6 hours if needed for mild pain (1 -  3).       aspirin 81 mg EC tablet           fluticasone 50 mcg/actuation nasal spray  Commonly known as: Flonase           glipiZIDE 10 mg tablet  Commonly known as: Glucotrol           hydrALAZINE 10 mg tablet  Commonly known as: Apresoline      Take 1 tablet (10 mg) by mouth 3 times a day.       losartan 50 mg tablet  Commonly known as: Cozaar      Take 1 tablet (50 mg) by mouth once daily.       metoprolol tartrate 50 mg tablet  Commonly known as: Lopressor      Take 1.5 tablets by mouth 2 times a day.       OneTouch Ultra Test strip  Generic drug: blood sugar diagnostic           oxyCODONE 5 mg immediate release tablet  Commonly known as: Roxicodone      Take 1 tablet (5 mg) by mouth every 6 hours if needed for moderate pain (4 - 6) or severe pain (7 - 10).       rosuvastatin 20 mg tablet  Commonly known as: Crestor           spironolactone 25 mg tablet  Commonly known as: Aldactone           tamsulosin 0.4 mg 24 hr capsule  Commonly known as: Flomax                     Where to Get Your Medications        These medications were sent to [facility] - [place] [id number] [place] [id number]      Phone: [phone]   dicyclomine 20 mg tablet  simethicone 125 mg chewable tablet         Procedure  Procedures     *This report was transcribed using voice recognition software.  Every effort was made to ensure accuracy; however, inadvertent computerized transcription errors may be present.*  Baldemar Gonzales MD  10/21/24         Baldemar Gonzales MD  10/21/24 1198

## 2024-10-22 ENCOUNTER — APPOINTMENT (OUTPATIENT)
Dept: CARDIOLOGY | Facility: HOSPITAL | Age: 69
End: 2024-10-22
Payer: MEDICARE

## 2024-10-22 LAB
ATRIAL RATE: 54 BPM
P AXIS: 263 DEGREES
PR INTERVAL: 265 MS
Q ONSET: 251 MS
QRS COUNT: 9 BEATS
QRS DURATION: 111 MS
QT INTERVAL: 427 MS
QTC CALCULATION(BAZETT): 405 MS
QTC FREDERICIA: 412 MS
R AXIS: -57 DEGREES
T AXIS: -51 DEGREES
T OFFSET: 464 MS
VENTRICULAR RATE: 54 BPM

## 2024-10-28 ENCOUNTER — TELEPHONE (OUTPATIENT)
Dept: CARDIOLOGY | Facility: HOSPITAL | Age: 69
End: 2024-10-28
Payer: MEDICARE

## 2024-11-01 ENCOUNTER — HOSPITAL ENCOUNTER (OUTPATIENT)
Facility: HOSPITAL | Age: 69
Setting detail: OUTPATIENT SURGERY
Discharge: HOME | End: 2024-11-01
Attending: INTERNAL MEDICINE | Admitting: INTERNAL MEDICINE
Payer: MEDICARE

## 2024-11-01 ENCOUNTER — PHARMACY VISIT (OUTPATIENT)
Dept: PHARMACY | Facility: CLINIC | Age: 69
End: 2024-11-01
Payer: MEDICARE

## 2024-11-01 VITALS
HEART RATE: 48 BPM | OXYGEN SATURATION: 99 % | BODY MASS INDEX: 36.57 KG/M2 | WEIGHT: 270 LBS | DIASTOLIC BLOOD PRESSURE: 82 MMHG | TEMPERATURE: 97.3 F | HEIGHT: 72 IN | SYSTOLIC BLOOD PRESSURE: 178 MMHG | RESPIRATION RATE: 12 BRPM

## 2024-11-01 DIAGNOSIS — I10 UNCONTROLLED HYPERTENSION: ICD-10-CM

## 2024-11-01 DIAGNOSIS — I25.10 MILD CORONARY ARTERY DISEASE: ICD-10-CM

## 2024-11-01 DIAGNOSIS — R93.1 ABNORMAL FINDINGS ON DIAGNOSTIC IMAGING OF HEART AND CORONARY CIRCULATION: ICD-10-CM

## 2024-11-01 DIAGNOSIS — R94.39 ABNORMAL STRESS TEST: ICD-10-CM

## 2024-11-01 DIAGNOSIS — I10 PRIMARY HYPERTENSION: ICD-10-CM

## 2024-11-01 DIAGNOSIS — R94.30 ABNORMAL RESULT OF CARDIOVASCULAR FUNCTION STUDY, UNSPECIFIED: ICD-10-CM

## 2024-11-01 DIAGNOSIS — R07.9 CHEST PAIN: ICD-10-CM

## 2024-11-01 DIAGNOSIS — N18.30 STAGE 3 CHRONIC KIDNEY DISEASE (MULTI): Primary | ICD-10-CM

## 2024-11-01 PROCEDURE — 99153 MOD SED SAME PHYS/QHP EA: CPT | Performed by: INTERNAL MEDICINE

## 2024-11-01 PROCEDURE — 7100000010 HC PHASE TWO TIME - EACH INCREMENTAL 1 MINUTE: Performed by: INTERNAL MEDICINE

## 2024-11-01 PROCEDURE — C1769 GUIDE WIRE: HCPCS | Performed by: INTERNAL MEDICINE

## 2024-11-01 PROCEDURE — 7100000009 HC PHASE TWO TIME - INITIAL BASE CHARGE: Performed by: INTERNAL MEDICINE

## 2024-11-01 PROCEDURE — C1760 CLOSURE DEV, VASC: HCPCS | Performed by: INTERNAL MEDICINE

## 2024-11-01 PROCEDURE — 99152 MOD SED SAME PHYS/QHP 5/>YRS: CPT | Performed by: INTERNAL MEDICINE

## 2024-11-01 PROCEDURE — 2500000004 HC RX 250 GENERAL PHARMACY W/ HCPCS (ALT 636 FOR OP/ED): Performed by: NURSE PRACTITIONER

## 2024-11-01 PROCEDURE — 93454 CORONARY ARTERY ANGIO S&I: CPT | Performed by: INTERNAL MEDICINE

## 2024-11-01 PROCEDURE — 2550000001 HC RX 255 CONTRASTS: Performed by: INTERNAL MEDICINE

## 2024-11-01 PROCEDURE — RXMED WILLOW AMBULATORY MEDICATION CHARGE

## 2024-11-01 PROCEDURE — 2780000003 HC OR 278 NO HCPCS: Performed by: INTERNAL MEDICINE

## 2024-11-01 PROCEDURE — 2720000007 HC OR 272 NO HCPCS: Performed by: INTERNAL MEDICINE

## 2024-11-01 PROCEDURE — C1894 INTRO/SHEATH, NON-LASER: HCPCS | Performed by: INTERNAL MEDICINE

## 2024-11-01 PROCEDURE — C1887 CATHETER, GUIDING: HCPCS | Performed by: INTERNAL MEDICINE

## 2024-11-01 PROCEDURE — 2500000004 HC RX 250 GENERAL PHARMACY W/ HCPCS (ALT 636 FOR OP/ED): Performed by: INTERNAL MEDICINE

## 2024-11-01 PROCEDURE — 99221 1ST HOSP IP/OBS SF/LOW 40: CPT | Performed by: NURSE PRACTITIONER

## 2024-11-01 RX ORDER — MIDAZOLAM HYDROCHLORIDE 1 MG/ML
INJECTION, SOLUTION INTRAMUSCULAR; INTRAVENOUS AS NEEDED
Status: DISCONTINUED | OUTPATIENT
Start: 2024-11-01 | End: 2024-11-01 | Stop reason: HOSPADM

## 2024-11-01 RX ORDER — ACETAMINOPHEN 325 MG/1
650 TABLET ORAL EVERY 6 HOURS PRN
Status: CANCELLED | OUTPATIENT
Start: 2024-11-01

## 2024-11-01 RX ORDER — LIDOCAINE HYDROCHLORIDE 20 MG/ML
INJECTION, SOLUTION INFILTRATION; PERINEURAL AS NEEDED
Status: DISCONTINUED | OUTPATIENT
Start: 2024-11-01 | End: 2024-11-01 | Stop reason: HOSPADM

## 2024-11-01 RX ORDER — SODIUM CHLORIDE 9 MG/ML
1.5 INJECTION, SOLUTION INTRAVENOUS CONTINUOUS
Status: DISCONTINUED | OUTPATIENT
Start: 2024-11-01 | End: 2024-11-01 | Stop reason: HOSPADM

## 2024-11-01 RX ORDER — LOSARTAN POTASSIUM 100 MG/1
100 TABLET ORAL DAILY
Qty: 90 TABLET | Refills: 3 | Status: SHIPPED | OUTPATIENT
Start: 2024-11-01 | End: 2025-11-01

## 2024-11-01 RX ORDER — LOSARTAN POTASSIUM 50 MG/1
100 TABLET ORAL DAILY
Qty: 180 TABLET | Refills: 3 | Status: SHIPPED | OUTPATIENT
Start: 2024-11-01 | End: 2024-11-01 | Stop reason: HOSPADM

## 2024-11-01 RX ORDER — ACETAMINOPHEN 160 MG/5ML
650 SOLUTION ORAL EVERY 6 HOURS PRN
Status: CANCELLED | OUTPATIENT
Start: 2024-11-01

## 2024-11-01 RX ORDER — HYDRALAZINE HYDROCHLORIDE 25 MG/1
25 TABLET, FILM COATED ORAL 3 TIMES DAILY
Qty: 270 TABLET | Refills: 3 | Status: SHIPPED | OUTPATIENT
Start: 2024-11-01 | End: 2025-11-01

## 2024-11-01 RX ORDER — ACETAMINOPHEN 650 MG/1
650 SUPPOSITORY RECTAL EVERY 6 HOURS PRN
Status: CANCELLED | OUTPATIENT
Start: 2024-11-01

## 2024-11-01 RX ORDER — MORPHINE SULFATE 2 MG/ML
2 INJECTION, SOLUTION INTRAMUSCULAR; INTRAVENOUS EVERY 6 HOURS PRN
Status: CANCELLED | OUTPATIENT
Start: 2024-11-01

## 2024-11-01 RX ORDER — HYDRALAZINE HYDROCHLORIDE 20 MG/ML
INJECTION INTRAMUSCULAR; INTRAVENOUS AS NEEDED
Status: DISCONTINUED | OUTPATIENT
Start: 2024-11-01 | End: 2024-11-01 | Stop reason: HOSPADM

## 2024-11-01 RX ORDER — FENTANYL CITRATE 50 UG/ML
INJECTION, SOLUTION INTRAMUSCULAR; INTRAVENOUS AS NEEDED
Status: DISCONTINUED | OUTPATIENT
Start: 2024-11-01 | End: 2024-11-01 | Stop reason: HOSPADM

## 2024-11-01 RX ORDER — HYDRALAZINE HYDROCHLORIDE 10 MG/1
25 TABLET, FILM COATED ORAL 3 TIMES DAILY
Qty: 675 TABLET | Refills: 3 | Status: SHIPPED | OUTPATIENT
Start: 2024-11-01 | End: 2024-11-01 | Stop reason: HOSPADM

## 2024-11-01 ASSESSMENT — ENCOUNTER SYMPTOMS
HEADACHES: 1
ABDOMINAL PAIN: 1
CONSTITUTIONAL NEGATIVE: 1
SHORTNESS OF BREATH: 1
PSYCHIATRIC NEGATIVE: 1

## 2024-11-01 ASSESSMENT — PAIN - FUNCTIONAL ASSESSMENT: PAIN_FUNCTIONAL_ASSESSMENT: 0-10

## 2024-11-01 ASSESSMENT — PAIN SCALES - GENERAL: PAINLEVEL_OUTOF10: 0 - NO PAIN

## 2024-11-06 ENCOUNTER — HOSPITAL ENCOUNTER (EMERGENCY)
Facility: HOSPITAL | Age: 69
Discharge: HOME | End: 2024-11-06
Attending: STUDENT IN AN ORGANIZED HEALTH CARE EDUCATION/TRAINING PROGRAM
Payer: MEDICARE

## 2024-11-06 ENCOUNTER — TELEPHONE (OUTPATIENT)
Dept: CARDIOLOGY | Facility: HOSPITAL | Age: 69
End: 2024-11-06
Payer: MEDICARE

## 2024-11-06 ENCOUNTER — APPOINTMENT (OUTPATIENT)
Dept: RADIOLOGY | Facility: HOSPITAL | Age: 69
End: 2024-11-06
Payer: MEDICARE

## 2024-11-06 VITALS
TEMPERATURE: 97.2 F | HEART RATE: 56 BPM | HEIGHT: 72 IN | RESPIRATION RATE: 16 BRPM | WEIGHT: 270 LBS | OXYGEN SATURATION: 97 % | SYSTOLIC BLOOD PRESSURE: 180 MMHG | BODY MASS INDEX: 36.57 KG/M2 | DIASTOLIC BLOOD PRESSURE: 77 MMHG

## 2024-11-06 DIAGNOSIS — M25.531 RIGHT WRIST PAIN: Primary | ICD-10-CM

## 2024-11-06 LAB
ABO GROUP (TYPE) IN BLOOD: NORMAL
ALBUMIN SERPL BCP-MCNC: 3.9 G/DL (ref 3.4–5)
ALP SERPL-CCNC: 105 U/L (ref 33–136)
ALT SERPL W P-5'-P-CCNC: 8 U/L (ref 10–52)
ANION GAP SERPL CALC-SCNC: 9 MMOL/L (ref 10–20)
ANTIBODY SCREEN: NORMAL
AST SERPL W P-5'-P-CCNC: 14 U/L (ref 9–39)
BASOPHILS # BLD AUTO: 0.03 X10*3/UL (ref 0–0.1)
BASOPHILS NFR BLD AUTO: 0.4 %
BILIRUB SERPL-MCNC: 0.7 MG/DL (ref 0–1.2)
BUN SERPL-MCNC: 16 MG/DL (ref 6–23)
CALCIUM SERPL-MCNC: 8.4 MG/DL (ref 8.6–10.3)
CHLORIDE SERPL-SCNC: 112 MMOL/L (ref 98–107)
CO2 SERPL-SCNC: 24 MMOL/L (ref 21–32)
CREAT SERPL-MCNC: 1.3 MG/DL (ref 0.5–1.3)
EGFRCR SERPLBLD CKD-EPI 2021: 60 ML/MIN/1.73M*2
EOSINOPHIL # BLD AUTO: 0.1 X10*3/UL (ref 0–0.7)
EOSINOPHIL NFR BLD AUTO: 1.3 %
ERYTHROCYTE [DISTWIDTH] IN BLOOD BY AUTOMATED COUNT: 14 % (ref 11.5–14.5)
GLUCOSE SERPL-MCNC: 78 MG/DL (ref 74–99)
HCT VFR BLD AUTO: 44 % (ref 41–52)
HGB BLD-MCNC: 14.5 G/DL (ref 13.5–17.5)
IMM GRANULOCYTES # BLD AUTO: 0.03 X10*3/UL (ref 0–0.7)
IMM GRANULOCYTES NFR BLD AUTO: 0.4 % (ref 0–0.9)
INR PPP: 0.9 (ref 0.9–1.1)
LYMPHOCYTES # BLD AUTO: 1.5 X10*3/UL (ref 1.2–4.8)
LYMPHOCYTES NFR BLD AUTO: 18.9 %
MCH RBC QN AUTO: 29.9 PG (ref 26–34)
MCHC RBC AUTO-ENTMCNC: 33 G/DL (ref 32–36)
MCV RBC AUTO: 91 FL (ref 80–100)
MONOCYTES # BLD AUTO: 0.51 X10*3/UL (ref 0.1–1)
MONOCYTES NFR BLD AUTO: 6.4 %
NEUTROPHILS # BLD AUTO: 5.75 X10*3/UL (ref 1.2–7.7)
NEUTROPHILS NFR BLD AUTO: 72.6 %
NRBC BLD-RTO: 0 /100 WBCS (ref 0–0)
PLATELET # BLD AUTO: 110 X10*3/UL (ref 150–450)
POTASSIUM SERPL-SCNC: 4.3 MMOL/L (ref 3.5–5.3)
PROT SERPL-MCNC: 6.4 G/DL (ref 6.4–8.2)
PROTHROMBIN TIME: 10.4 SECONDS (ref 9.8–12.8)
RBC # BLD AUTO: 4.85 X10*6/UL (ref 4.5–5.9)
RH FACTOR (ANTIGEN D): NORMAL
SODIUM SERPL-SCNC: 141 MMOL/L (ref 136–145)
WBC # BLD AUTO: 7.9 X10*3/UL (ref 4.4–11.3)

## 2024-11-06 PROCEDURE — 85610 PROTHROMBIN TIME: CPT | Performed by: STUDENT IN AN ORGANIZED HEALTH CARE EDUCATION/TRAINING PROGRAM

## 2024-11-06 PROCEDURE — 99284 EMERGENCY DEPT VISIT MOD MDM: CPT | Mod: 25

## 2024-11-06 PROCEDURE — 86901 BLOOD TYPING SEROLOGIC RH(D): CPT | Performed by: STUDENT IN AN ORGANIZED HEALTH CARE EDUCATION/TRAINING PROGRAM

## 2024-11-06 PROCEDURE — 2550000001 HC RX 255 CONTRASTS: Performed by: STUDENT IN AN ORGANIZED HEALTH CARE EDUCATION/TRAINING PROGRAM

## 2024-11-06 PROCEDURE — 73206 CT ANGIO UPR EXTRM W/O&W/DYE: CPT | Mod: RT

## 2024-11-06 PROCEDURE — 85025 COMPLETE CBC W/AUTO DIFF WBC: CPT | Performed by: STUDENT IN AN ORGANIZED HEALTH CARE EDUCATION/TRAINING PROGRAM

## 2024-11-06 PROCEDURE — 84075 ASSAY ALKALINE PHOSPHATASE: CPT | Performed by: STUDENT IN AN ORGANIZED HEALTH CARE EDUCATION/TRAINING PROGRAM

## 2024-11-06 PROCEDURE — 73206 CT ANGIO UPR EXTRM W/O&W/DYE: CPT | Mod: RIGHT SIDE | Performed by: STUDENT IN AN ORGANIZED HEALTH CARE EDUCATION/TRAINING PROGRAM

## 2024-11-06 ASSESSMENT — LIFESTYLE VARIABLES
EVER FELT BAD OR GUILTY ABOUT YOUR DRINKING: NO
HAVE YOU EVER FELT YOU SHOULD CUT DOWN ON YOUR DRINKING: NO
TOTAL SCORE: 0
EVER HAD A DRINK FIRST THING IN THE MORNING TO STEADY YOUR NERVES TO GET RID OF A HANGOVER: NO
HAVE PEOPLE ANNOYED YOU BY CRITICIZING YOUR DRINKING: NO

## 2024-11-06 ASSESSMENT — PAIN - FUNCTIONAL ASSESSMENT: PAIN_FUNCTIONAL_ASSESSMENT: 0-10

## 2024-11-06 ASSESSMENT — PAIN DESCRIPTION - ORIENTATION: ORIENTATION: RIGHT

## 2024-11-06 ASSESSMENT — PAIN SCALES - GENERAL: PAINLEVEL_OUTOF10: 7

## 2024-11-06 ASSESSMENT — PAIN DESCRIPTION - PAIN TYPE: TYPE: ACUTE PAIN

## 2024-11-06 ASSESSMENT — PAIN DESCRIPTION - LOCATION: LOCATION: WRIST

## 2024-11-06 NOTE — PROGRESS NOTES
Emergency Medicine Transition of Care Note.    I received Kaveh Adkins in signout from Dr. Leiva.  Please see the previous ED provider note for all HPI, PE and MDM up to the time of signout at 1700. This is in addition to the primary record.    In brief Kaveh Adkins is an 69 y.o. male presenting for   Chief Complaint   Patient presents with    Post-op Problem     At the time of signout we were awaiting: CT angiogram results    Diagnoses as of 11/06/24 2124   Right wrist pain       Medical Decision Making    CT angiogram shows no evidence of extravasation of blood or acute findings other than post cath changes.  Patient has high-grade stenosis of the left carotid which is known.  Patient is stable for discharge at this time.  Final diagnoses:   None           Procedure  Procedures    Nancy Peng MD

## 2024-11-06 NOTE — TELEPHONE ENCOUNTER
RN spoke with the patient at this time regarding swelling, pain and bruising to his right wrist where he cath was done. RN adivsed to hold pressure and go to the ER to be seen. Pts wife verablized understanding at this time.

## 2024-11-06 NOTE — ED PROVIDER NOTES
HPI   Chief Complaint   Patient presents with    Post-op Problem       HPI: Patient is a 69-year-old male with a history of an aortic aneurysm, hypertension, hyperlipidemia, type 2 diabetes, recent heart cath on 1 November where they initially attempted to go through the right radial artery but switched to the groin, was found to have mild nonobstructive coronary artery disease, he states that since the procedure he has been having bruising, pain, and swelling to the right arm.  He noticed that the bruising has become more significant as has the swelling and the pain is starting to radiate from his wrist up to his arm and shoulder.  He denies any trauma apart from the procedure.  No paresthesias or weakness.  He denies any discharge or bleeding.      ROS: Complete 12 point review of systems performed, otherwise negative except as noted in the history of present illness    PMH: Reviewed, documented below in note. Pertinents in HPI  PSH: Reviewed and documented below in note. Pertinents in HPI  SH: No illicits, not homeless  Fam: Reviewed, noncontributory to patients current complaint  MEDS: Reviewed and documented below in note. Pertinents in HPI  ALLERGIES: Reviewed and documented below in note.        History provided by:  Patient   used: No                          Cassi Coma Scale Score: 15                  Patient History   Past Medical History:   Diagnosis Date    Aortic aneurysm of unspecified site, without rupture (CMS-HCC)     Aneurysm of descending aorta    Cancer (Multi)     Essential (primary) hypertension 12/04/2013    Benign essential hypertension    Personal history of malignant neoplasm, unspecified     History of malignant neoplasm    Personal history of other diseases of the circulatory system     History of hypertension    Personal history of other endocrine, nutritional and metabolic disease     History of high cholesterol    Personal history of other specified conditions  06/18/2018    History of gross hematuria    Type 2 diabetes mellitus without complications (Multi) 12/04/2013    Diabetes mellitus     Past Surgical History:   Procedure Laterality Date    ABDOMINAL SURGERY      APPENDECTOMY  06/08/2024    CARDIAC CATHETERIZATION N/A 11/1/2024    Procedure: Left Heart Cath;  Surgeon: Wu Dwyer MD;  Location: [facility];  Service: Cardiovascular;  Laterality: N/A;  3 hr hydration  / arrive 8:30    HERNIA REPAIR  06/18/2018    Hernia Repair    OTHER SURGICAL HISTORY  10/04/2021    Gallbladder surgery    OTHER SURGICAL HISTORY  10/04/2021    Abdominal surgery    OTHER SURGICAL HISTORY  10/21/2022    Small bowel resection     Family History   Problem Relation Name Age of Onset    Colon cancer Other mat. great granfather 96     Social History     Tobacco Use    Smoking status: Some Days     Types: Cigars     Start date: 4/3/2023    Smokeless tobacco: Never   Vaping Use    Vaping status: Never Used   Substance Use Topics    Alcohol use: Not Currently    Drug use: Never       Physical Exam   Visit Vitals  BP (!) 187/96   Pulse 62   Temp 36.2 °C (97.2 °F) (Skin)   Resp 16   Ht 1.829 m (6')   Wt 122 kg (270 lb)   SpO2 97%   BMI 36.62 kg/m²   Smoking Status Some Days   BSA 2.49 m²      Physical Exam  Vitals and nursing note reviewed.   Constitutional:       Appearance: Normal appearance.   HENT:      Head: Normocephalic and atraumatic.      Mouth/Throat:      Mouth: Mucous membranes are moist.      Pharynx: Oropharynx is clear.   Eyes:      Extraocular Movements: Extraocular movements intact.      Pupils: Pupils are equal, round, and reactive to light.   Neck:      Vascular: No carotid bruit.   Cardiovascular:      Rate and Rhythm: Normal rate and regular rhythm.      Heart sounds: Normal heart sounds.      Comments: Decreased pulse in the RUE  Pulmonary:      Effort: Pulmonary effort is normal.      Breath sounds: Normal breath sounds.   Abdominal:      General: There is no  distension.      Palpations: Abdomen is soft.      Tenderness: There is no abdominal tenderness. There is no right CVA tenderness, left CVA tenderness, guarding or rebound.   Musculoskeletal:         General: Swelling and tenderness present. No deformity or signs of injury.      Cervical back: Normal range of motion. No rigidity.      Comments: Patient has bruising and swelling noted to the right upper extremity.  The bruising is around the wrist where the cath was attempted.  The swelling extends proximally.   Skin:     General: Skin is warm and dry.      Capillary Refill: Capillary refill takes less than 2 seconds.      Findings: Bruising present.   Neurological:      General: No focal deficit present.      Mental Status: He is alert and oriented to person, place, and time.      Sensory: No sensory deficit.      Motor: No weakness.   Psychiatric:         Mood and Affect: Mood normal.         Behavior: Behavior normal.         CT angio upper extremity right w and or wo IV contrast    (Results Pending)       Labs Reviewed   CBC WITH AUTO DIFFERENTIAL   COMPREHENSIVE METABOLIC PANEL   PROTIME-INR   TYPE AND SCREEN         ED Course & MDM   Diagnoses as of 11/07/24 0739   Right wrist pain           Medical Decision Making  All mentioned lab results, ECGs, and imaging were independently reviewed by myself  - Patient evaluated. Patient is presenting to the emergency department for pain bruising and swelling of the right arm.  I did reach out to Dr. Dwyer regarding the case as the patient did undergo a cath with him on Friday.  Dr. Dwyer reports he attempted to go in the right radial artery but was unsuccessful and they converted to a femoral approach.  He is recommending workup to rule out hematomas, pseudoaneurysms, or significant occlusion or stenosis.  Labs and imaging was ordered for the patient.  My suspicion for the patient could be just postoperative swelling, soft tissue hematoma, vasospasm also considered on my  differential, however I do want to rule out dissection, pseudoaneurysms, or potential arterial extravasation.  Labs obtained demonstrated stable hemoglobin lowering suspicion for a significant bleed.  Rest of the patient's labs are otherwise reassuring, I did time of signout the patient is pending a CT angio and final disposition.  - Monitored for any changes in stability or symptomatology. Patient remained stable.     *Disclaimer: This note was dictated by speech recognition. Minor errors in transcription may be present. Please call with questions.    Franklin Gonzales MD             Your medication list        CONTINUE taking these medications        Instructions Last Dose Given Next Dose Due   OneTouch Ultra2 Meter misc  Generic drug: blood-glucose meter                  ASK your doctor about these medications        Instructions Last Dose Given Next Dose Due   acetaminophen 325 mg tablet  Commonly known as: TylenoL      Take 2 tablets (650 mg) by mouth every 6 hours if needed for mild pain (1 - 3).       aspirin 81 mg EC tablet           fluticasone 50 mcg/actuation nasal spray  Commonly known as: Flonase           glipiZIDE 10 mg tablet  Commonly known as: Glucotrol           hydrALAZINE 25 mg tablet  Commonly known as: Apresoline      Take 1 tablet (25 mg) by mouth 3 times a day.       losartan 100 mg tablet  Commonly known as: Cozaar      Take 1 tablet (100 mg) by mouth once daily.       metoprolol tartrate 50 mg tablet  Commonly known as: Lopressor      Take 1.5 tablets by mouth 2 times a day.       OneTouch Ultra Test strip  Generic drug: blood sugar diagnostic           oxyCODONE 5 mg immediate release tablet  Commonly known as: Roxicodone      Take 1 tablet (5 mg) by mouth every 6 hours if needed for moderate pain (4 - 6) or severe pain (7 - 10).       rosuvastatin 20 mg tablet  Commonly known as: Crestor           spironolactone 25 mg tablet  Commonly known as: Aldactone           tamsulosin 0.4 mg 24  hr capsule  Commonly known as: Flomax                    Procedure  Procedures     *This report was transcribed using voice recognition software.  Every effort was made to ensure accuracy; however, inadvertent computerized transcription errors may be present.*  Baldemar Gonzales MD  11/06/24         Baldemar Gonzales MD  11/07/24 0769

## 2024-11-06 NOTE — ED TRIAGE NOTES
Pt presents for possible bleeding in his wrist/ar following a cardiac cath on Friday. He went into the office today and was told to come into the ED to assess for leaking at the insertion site of his right wrist. He does has ecchymosis present without external bleeding noted. States that he also has pain up into his shoulder from either the bleeding or his arthritis. He states that they had a hard time doing the cath radially and had to go in through his groin. Alert and oriented x's 4.

## 2024-11-12 ENCOUNTER — OFFICE VISIT (OUTPATIENT)
Dept: CARDIOLOGY | Facility: CLINIC | Age: 69
End: 2024-11-12
Payer: MEDICARE

## 2024-11-12 VITALS
SYSTOLIC BLOOD PRESSURE: 198 MMHG | HEART RATE: 60 BPM | BODY MASS INDEX: 37.43 KG/M2 | DIASTOLIC BLOOD PRESSURE: 81 MMHG | WEIGHT: 276 LBS

## 2024-11-12 DIAGNOSIS — I25.10 ATHEROSCLEROSIS OF NATIVE CORONARY ARTERY OF NATIVE HEART WITHOUT ANGINA PECTORIS: Primary | ICD-10-CM

## 2024-11-12 DIAGNOSIS — Z86.79 HISTORY OF HYPERTENSION: ICD-10-CM

## 2024-11-12 DIAGNOSIS — E78.2 MIXED HYPERLIPIDEMIA: ICD-10-CM

## 2024-11-12 DIAGNOSIS — I71.21 ANEURYSM OF ASCENDING AORTA WITHOUT RUPTURE (CMS-HCC): ICD-10-CM

## 2024-11-12 PROBLEM — I71.40 ABDOMINAL AORTIC ANEURYSM, WITHOUT RUPTURE, UNSPECIFIED (CMS-HCC): Status: RESOLVED | Noted: 2023-07-21 | Resolved: 2024-11-12

## 2024-11-12 PROCEDURE — 4010F ACE/ARB THERAPY RXD/TAKEN: CPT | Performed by: INTERNAL MEDICINE

## 2024-11-12 PROCEDURE — 99214 OFFICE O/P EST MOD 30 MIN: CPT | Performed by: INTERNAL MEDICINE

## 2024-11-12 PROCEDURE — 3079F DIAST BP 80-89 MM HG: CPT | Performed by: INTERNAL MEDICINE

## 2024-11-12 PROCEDURE — 3077F SYST BP >= 140 MM HG: CPT | Performed by: INTERNAL MEDICINE

## 2024-11-12 PROCEDURE — 1159F MED LIST DOCD IN RCRD: CPT | Performed by: INTERNAL MEDICINE

## 2024-11-12 PROCEDURE — 1160F RVW MEDS BY RX/DR IN RCRD: CPT | Performed by: INTERNAL MEDICINE

## 2024-11-12 RX ORDER — HYDROCHLOROTHIAZIDE 25 MG/1
25 TABLET ORAL DAILY
Qty: 30 TABLET | Refills: 11 | Status: SHIPPED | OUTPATIENT
Start: 2024-11-12 | End: 2025-11-12

## 2024-11-12 RX ORDER — AMLODIPINE BESYLATE 5 MG/1
5 TABLET ORAL DAILY
Qty: 30 TABLET | Refills: 11 | Status: SHIPPED | OUTPATIENT
Start: 2024-11-12 | End: 2025-11-12

## 2024-11-12 NOTE — PROGRESS NOTES
Chief Complaint:   No chief complaint on file.     History of Present Illness     Kaveh Adkins is a 69 y.o. male presenting with coronary artery disease.  Kaveh initially presented with atypical chest pain and has been treated with medical therapy for mild CAD 11/1/24.  He has not undergone revascularization.  The patient is tolerating guideline-directed medical therapy with antiplatelet (ASA) and statin medication and is compliant.  The patient does not exercise regularly.  The patient is not having any anginal symptoms or dyspnea on exertion.  Dx 4.4 cm TAA on recent CT.  C/O HA and BP has been elevated.    Review of symptoms  All pertinent systems have been reviewed and are negative except for what is stated in the history of present illness.    All other systems have been reviewed and are negative and noncontributory to this patient's current ailments.  .       Previous History     Past Medical History:  He has a past medical history of Aortic aneurysm of unspecified site, without rupture (CMS-HCC), Cancer (Multi), Essential (primary) hypertension (12/04/2013), Personal history of malignant neoplasm, unspecified, Personal history of other diseases of the circulatory system, Personal history of other endocrine, nutritional and metabolic disease, Personal history of other specified conditions (06/18/2018), and Type 2 diabetes mellitus without complications (Multi) (12/04/2013).    Past Surgical History:  He has a past surgical history that includes Other surgical history (10/04/2021); Other surgical history (10/04/2021); Other surgical history (10/21/2022); Hernia repair (06/18/2018); Appendectomy (06/08/2024); Abdominal surgery; and Cardiac catheterization (N/A, 11/1/2024).      Social History:  He reports that he has been smoking cigars. He started smoking about 19 months ago. He has never used smokeless tobacco. He reports that he does not currently use alcohol. He reports that he does not use  drugs.    Family History:  Family History   Problem Relation Name Age of Onset    Colon cancer Other mat. great granfather 96        Allergies:  Cephalosporins, Ciprofloxacin, Cefuroxime axetil, Doxycycline, Furosemide, and Hydrochlorothiazide    Outpatient Medications:  Current Outpatient Medications   Medication Instructions    acetaminophen (TYLENOL) 650 mg, oral, Every 6 hours PRN    aspirin 81 mg, Daily    fluticasone (Flonase) 50 mcg/actuation nasal spray 2 sprays, Daily    glipiZIDE (GLUCOTROL) 10 mg, Daily    hydrALAZINE (APRESOLINE) 25 mg, oral, 3 times daily    losartan (COZAAR) 100 mg, oral, Daily    metoprolol tartrate (LOPRESSOR) 75 mg, oral, 2 times daily    OneTouch Ultra Test strip     OneTouch Ultra2 Meter misc     oxyCODONE (ROXICODONE) 5 mg, oral, Every 6 hours PRN    rosuvastatin (Crestor) 20 mg tablet 1 tablet, Daily    spironolactone (Aldactone) 25 mg tablet 1 tablet, Daily    tamsulosin (Flomax) 0.4 mg 24 hr capsule 1 capsule, Daily       Physical Examination   Vitals:  Visit Vitals  BP (!) 198/81 (BP Location: Right arm, Patient Position: Sitting, BP Cuff Size: Large adult)   Pulse 60   Wt 125 kg (276 lb)   BMI 37.43 kg/m²   Smoking Status Some Days   BSA 2.52 m²    Physical Exam  Vitals reviewed.   Constitutional:       General: He is not in acute distress.     Appearance: Normal appearance.   HENT:      Head: Normocephalic and atraumatic.      Nose: Nose normal.   Eyes:      Conjunctiva/sclera: Conjunctivae normal.   Cardiovascular:      Rate and Rhythm: Normal rate and regular rhythm.      Pulses: Normal pulses.      Heart sounds: No murmur heard.  Pulmonary:      Effort: Pulmonary effort is normal. No respiratory distress.      Breath sounds: Normal breath sounds. No wheezing, rhonchi or rales.   Abdominal:      General: Bowel sounds are normal. There is no distension.      Palpations: Abdomen is soft.      Tenderness: There is no abdominal tenderness.   Musculoskeletal:         General:  No swelling.      Right lower leg: No edema.      Left lower leg: No edema.   Skin:     General: Skin is warm and dry.      Capillary Refill: Capillary refill takes less than 2 seconds.   Neurological:      General: No focal deficit present.      Mental Status: He is alert.   Psychiatric:         Mood and Affect: Mood normal.             Labs/Imaging/Cardiac Studies     Last Labs:  CBC -  Lab Results   Component Value Date    WBC 7.9 11/06/2024    HGB 14.5 11/06/2024    HCT 44.0 11/06/2024    MCV 91 11/06/2024     (L) 11/06/2024       CMP -  Lab Results   Component Value Date    CALCIUM 8.4 (L) 11/06/2024    PHOS 2.7 06/12/2024    PROT 6.4 11/06/2024    ALBUMIN 3.9 11/06/2024    AST 14 11/06/2024    ALT 8 (L) 11/06/2024    ALKPHOS 105 11/06/2024    BILITOT 0.7 11/06/2024       LIPID PANEL -   Lab Results   Component Value Date    CHOL 97 09/17/2021    HDL 36.4 (A) 09/17/2021    CHHDL 2.7 09/17/2021    VLDL 25 09/17/2021    TRIG 123 09/17/2021    NHDL 73 11/30/2019       RENAL FUNCTION PANEL -   Lab Results   Component Value Date    K 4.3 11/06/2024    PHOS 2.7 06/12/2024       Lab Results   Component Value Date    HGBA1C 6.9 (A) 12/19/2021       ECG:    Echo:  Transthoracic echo (TTE) complete    Result Date: 9/29/2024              [place] [address]      Phone [phone] Fax [phone] TRANSTHORACIC ECHOCARDIOGRAM REPORT Patient Name:      BLAKE Jones Physician:    [id number] Oscar Leon MD Study Date:        9/27/2024            Ordering Provider:    [id number] LUCAS MRN/PID:           [id number]             Fellow: Accession#:        [id number]         Nurse:                Nadege Khan RN Date of Birth/Age: 1955 / 69 years Sonographer:          Mary Key RDCS Gender:            M                    Additional Staff: Height:            182.88 cm            Admit Date:            9/27/2024 Weight:            120.20 kg            Admission Status:     Outpatient BSA / BMI:         2.40 m2 / 35.94      Department Location:  [facility] Echo                    kg/m2                                      Lab Blood Pressure: 152 /88 mmHg Study Type:    TRANSTHORACIC ECHO (TTE) COMPLETE Diagnosis/ICD: Chest pain, unspecified-R07.9 Indication:    Hypertension CPT Codes:     Echo Complete w Full [id number] Patient History: Pertinent History: HTN, Hyperlipidemia and CAD. Study Detail: The following Echo studies were performed: 2D, M-Mode, Doppler and               color flow. Technically challenging study due to body habitus.               Definity used as a contrast agent for endocardial border               definition. Total contrast used for this procedure was 2 mL via IV               push.  PHYSICIAN INTERPRETATION: Left Ventricle: Left ventricular ejection fraction is normal, by visual estimate at 60-65%. There are no regional wall motion abnormalities. The left ventricular cavity size is normal. The left ventricular septal wall thickness is mildly increased. There is mildly increased left ventricular posterior wall thickness. Spectral Doppler shows a normal pattern of left ventricular diastolic filling. Left Atrium: The left atrium is normal in size. Right Ventricle: The right ventricle is normal in size. There is normal right ventricular global systolic function. Right Atrium: The right atrium is normal in size. Aortic Valve: The aortic valve is trileaflet. The aortic valve dimensionless index is 0.86. There is trace aortic valve regurgitation. The mean gradient of the aortic valve is 3.0 mmHg. Mitral Valve: The mitral valve is normal in structure. There is no evidence of mitral valve regurgitation. Tricuspid Valve: The tricuspid valve is structurally normal. No evidence of tricuspid regurgitation. The right ventricular systolic pressure is unable to be estimated. Pulmonic Valve:  The pulmonic valve is structurally normal. There is no indication of pulmonic valve regurgitation. Pericardium: No pericardial effusion noted. Aorta: The aortic root is normal. There is mild dilatation of the ascending aorta.  CONCLUSIONS:  1. Left ventricular ejection fraction is normal, by visual estimate at 60-65%.  2. Poorly visualized anatomical structures due to suboptimal image quality.  3. There is normal right ventricular global systolic function. QUANTITATIVE DATA SUMMARY:  2D MEASUREMENTS:            Normal Ranges: LAs:             4.00 cm    (2.7-4.0cm) IVSd:            1.30 cm    (0.6-1.1cm) LVPWd:           1.30 cm    (0.6-1.1cm) LVIDd:           5.90 cm    (3.9-5.9cm) LVIDs:           3.50 cm LV Mass Index:   141.9 g/m2 LV % FS          40.7 %  LA VOLUME:                    Normal Ranges: LA Vol A4C:        42.7 ml    (22+/-6mL/m2) LA Vol A2C:        67.1 ml LA Vol BP:         58.8 ml LA Vol Index A4C:  17.8ml/m2 LA Vol Index A2C:  28.0 ml/m2 LA Vol Index BP:   24.5 ml/m2 LA Area A4C:       16.7 cm2 LA Area A2C:       23.0 cm2 LA Major Axis A4C: 5.6 cm LA Major Axis A2C: 6.7 cm LA Volume Index:   24.5 ml/m2  RA VOLUME BY A/L METHOD:          Normal Ranges: RA Area A4C:             17.6 cm2  AORTA MEASUREMENTS:         Normal Ranges: Ao Sinus, d:        3.68 cm (2.1-3.5cm) Ao STJ, d:          3.87 cm (1.7-3.4cm) Asc Ao, d:          4.10 cm (2.1-3.4cm)  LV SYSTOLIC FUNCTION BY 2D PLANIMETRY (MOD):                      Normal Ranges: EF-A4C View:    73 % (>=55%) EF-A2C View:    61 % EF-Biplane:     68 % EF-Visual:      63 % LV EF Reported: 63 %  LV DIASTOLIC FUNCTION:           Normal Ranges: MV Peak E:             0.56 m/s  (0.7-1.2 m/s) MV Peak A:             1.00 m/s  (0.42-0.7 m/s) E/A Ratio:             0.56      (1.0-2.2) MV e'                  0.075 m/s (>8.0) MV lateral e'          0.08 m/s MV medial e'           0.07 m/s E/e' Ratio:            7.49      (<8.0)  MITRAL VALVE:          Normal  Ranges: MV DT:        401 msec (150-240msec)  AORTIC VALVE:                     Normal Ranges: AoV Mean PG:             3.0 mmHg (1.7-11.5mmHg) LVOT Max Jamie:            1.16 m/s (<=1.1m/s) AoV VTI:                 27.60 cm (18-25cm) LVOT VTI:                23.70 cm LVOT Diameter:           2.00 cm  (1.8-2.4cm) AoV Area, VTI:           2.70 cm2 (2.5-5.5cm2) AoV Dimensionless Index: 0.86  RIGHT VENTRICLE: RV Basal 3.80 cm RV Mid   3.90 cm RV Major 8.0 cm TAPSE:   28.0 mm RV s'    0.11 m/s  [id number] Oscar Leon MD Electronically signed on 9/29/2024 at 9:50:38 PM  ** Final **         Assessment and Recommendations     Assessment/Plan     1. Atherosclerosis of native coronary artery of native heart without angina pectoris (Primary)  The patient's CAD, as detailed in the HPI, has been clinically stable, without any anginal symptoms or dyspnea.  The patient will continue treatment with guideline-directed medical therapy with antiplatelet and statin medications and was advised regular exercise and a heart healthy diet.        2. History of hypertension  Uncontrolled.  Add Amlodipine.  Change to hydrochlorothiazide.    3. Mixed hyperlipidemia  The patient's lipids are well controlled on chronic statin therapy and they are meeting their goal LDL cholesterol per the ACC/AHA guidelines.      4. Aneurysm of ascending aorta without rupture (CMS-HCC)  Stable and 4.4 cm asymptomatic ascending aortic aneurysm by recent imaging.  There is no indication for surgical repair. The patient is tolerating their anti-hypertensive medications including beta blocker and/or ACE/ARB when appropriate to limit expansion and is not achieving a goal blood pressure of less than 130/80.  The patient will be schedule for annual surveillance imaging.       Kaveh Adkins will return in 1 month for an office visit.       Rao Ellison MD    Exclusive of any other services or procedures performed, I, Rao Ellison MD , spent 30 minutes in duration for  this visit today.  This time consisted of chart review, obtaining history, and/or performing the exam as documented above as well as documenting the clinical information for the encounter in the electronic record, discussing treatment options, plans, and/or goals with patient, family, and/or caregiver, refilling medications, updating the electronic record, ordering medicines, lab work, imaging, referrals, and/or procedures as documented above and communicating with other [facility] professionals. I have discussed the results of laboratory, radiology, and cardiology studies with the patient and their family/caregiver.

## 2024-11-19 ENCOUNTER — LAB (OUTPATIENT)
Dept: LAB | Facility: LAB | Age: 69
End: 2024-11-19
Payer: MEDICARE

## 2024-11-19 DIAGNOSIS — I25.10 ATHEROSCLEROSIS OF NATIVE CORONARY ARTERY OF NATIVE HEART WITHOUT ANGINA PECTORIS: ICD-10-CM

## 2024-11-19 DIAGNOSIS — E78.2 MIXED HYPERLIPIDEMIA: ICD-10-CM

## 2024-11-19 DIAGNOSIS — I71.21 ANEURYSM OF ASCENDING AORTA WITHOUT RUPTURE (CMS-HCC): ICD-10-CM

## 2024-11-19 DIAGNOSIS — Z86.79 HISTORY OF HYPERTENSION: ICD-10-CM

## 2024-11-19 LAB
ANION GAP SERPL CALC-SCNC: 11 MMOL/L (ref 10–20)
BUN SERPL-MCNC: 18 MG/DL (ref 6–23)
CALCIUM SERPL-MCNC: 8.5 MG/DL (ref 8.6–10.3)
CHLORIDE SERPL-SCNC: 104 MMOL/L (ref 98–107)
CO2 SERPL-SCNC: 28 MMOL/L (ref 21–32)
CREAT SERPL-MCNC: 1.39 MG/DL (ref 0.5–1.3)
EGFRCR SERPLBLD CKD-EPI 2021: 56 ML/MIN/1.73M*2
GLUCOSE SERPL-MCNC: 182 MG/DL (ref 74–99)
POTASSIUM SERPL-SCNC: 3.9 MMOL/L (ref 3.5–5.3)
SODIUM SERPL-SCNC: 139 MMOL/L (ref 136–145)

## 2024-11-19 PROCEDURE — 80048 BASIC METABOLIC PNL TOTAL CA: CPT

## 2024-11-19 PROCEDURE — 80061 LIPID PANEL: CPT

## 2024-11-21 NOTE — PROGRESS NOTES
Counseling:  The patient was counseled regarding diagnostic results, instructions for management, risk factor reductions, prognosis, patient and family education, impressions, risks and benefits of treatment options and importance of compliance with treatment.      Chief Complaint:  The patient presents today for 2-month followup of HTN, chest pain and exertional SOB s/p renal artery U/S, echocardiogram, stress test and subsequent LHC.     History Of Present Illness:    Kaveh Adkins is a 69 y.o. male patient who presents today for 2-month followup of HTN, chest pain and exertional SOB s/p renal artery U/S, echocardiogram, stress test and subsequent LHC. His PMH is significant for HTN, DM type 2, hiatal hernia and NALLELY. On 09/27/2024, echocardiogram demonstrated an EF of 60-65%, normal RV systolic function and mild dilatation of the ascending aorta (4.1 cm), and stress testing revealed an intermediate sized, mildly severe, partially reversible perfusion defect in the distal anterior wall and apex consistent with evidence of mixture of ischemia or infarction and a dilated ascending aorta measuring ~5.2 cm. Renal artery U/S performed 10/07/2024 was negative for stenosis bilaterally. Based on abnormal stress testing, LHC was ordered, performed 11/01/2024, and revealed mild non-obstructive coronary artery disease. The patient presented to the ED on 11/06/2024 with bruising, pain and swelling to his right arm. While in the ED, workup was negative for pseudoaneurysm. Today, the patient states that he is feeling well, having recovered from LHC. The pain in his right arm has nearly resolved. Per the patient, his BP tends to elevated as the day progresses toward the evening. He is compliant with his prescribed medications.     Past Surgical History:  He has a past surgical history that includes Other surgical history (10/04/2021); Other surgical history (10/04/2021); Other surgical history (10/21/2022); Hernia repair  (06/18/2018); Appendectomy (06/08/2024); Abdominal surgery; and Cardiac catheterization (N/A, 11/1/2024).      Social History:  He reports that he has been smoking cigars. He started smoking about 19 months ago. He has never used smokeless tobacco. He reports that he does not currently use alcohol. He reports that he does not use drugs.    Family History:  Family History   Problem Relation Name Age of Onset    Colon cancer Other mat. great granfather 96        Allergies:  Cephalosporins, Ciprofloxacin, Cefuroxime axetil, Doxycycline, Furosemide, and Hydrochlorothiazide    Outpatient Medications:  Current Outpatient Medications   Medication Instructions    acetaminophen (TYLENOL) 650 mg, oral, Every 6 hours PRN    amLODIPine (NORVASC) 5 mg, oral, Daily    aspirin 81 mg, Daily    fluticasone (Flonase) 50 mcg/actuation nasal spray 2 sprays, Daily    glipiZIDE (GLUCOTROL) 10 mg, Daily    hydrALAZINE (APRESOLINE) 25 mg, oral, 3 times daily    hydroCHLOROthiazide (HYDRODIURIL) 25 mg, oral, Daily    losartan (COZAAR) 100 mg, oral, Daily    metoprolol tartrate (LOPRESSOR) 75 mg, oral, 2 times daily    OneTouch Ultra Test strip     OneTouch Ultra2 Meter misc     oxyCODONE (ROXICODONE) 5 mg, oral, Every 6 hours PRN    rosuvastatin (Crestor) 20 mg tablet 1 tablet, Daily    tamsulosin (Flomax) 0.4 mg 24 hr capsule 1 capsule, Daily        Last Recorded Vitals:  Vitals:    11/22/24 1028   BP: 162/80   BP Location: Left arm   Pulse: 50   SpO2: 97%   Weight: 123 kg (272 lb)   Height: 1.829 m (6')         Review of Systems   All other systems reviewed and are negative.     Physical Exam:  Constitutional:       Appearance: Healthy appearance. Not in distress.   Neck:      Vascular: No JVR. JVD normal.   Pulmonary:      Effort: Pulmonary effort is normal.      Breath sounds: Normal breath sounds. No wheezing. No rhonchi. No rales.   Chest:      Chest wall: Not tender to palpatation.   Cardiovascular:      PMI at left midclavicular  line. Normal rate. Regular rhythm. Normal S1. Normal S2.       Murmurs: There is no murmur.      No gallop.  No click. No rub.   Pulses:     Intact distal pulses.   Edema:     Peripheral edema absent.   Abdominal:      General: Bowel sounds are normal.      Palpations: Abdomen is soft.      Tenderness: There is no abdominal tenderness.   Musculoskeletal: Normal range of motion.         General: No tenderness. Skin:     General: Skin is warm and dry.   Neurological:      General: No focal deficit present.      Mental Status: Alert and oriented to person, place and time.            Last Labs:  CBC -  Lab Results   Component Value Date    WBC 7.9 11/06/2024    HGB 14.5 11/06/2024    HCT 44.0 11/06/2024    MCV 91 11/06/2024     (L) 11/06/2024       CMP -  Lab Results   Component Value Date    CALCIUM 8.5 (L) 11/19/2024    PHOS 2.7 06/12/2024    PROT 6.4 11/06/2024    ALBUMIN 3.9 11/06/2024    AST 14 11/06/2024    ALT 8 (L) 11/06/2024    ALKPHOS 105 11/06/2024    BILITOT 0.7 11/06/2024       LIPID PANEL -   Lab Results   Component Value Date    CHOL 97 09/17/2021    TRIG 123 09/17/2021    HDL 36.4 (A) 09/17/2021    CHHDL 2.7 09/17/2021    LDLF 36 09/17/2021    VLDL 25 09/17/2021    NHDL 73 11/30/2019       RENAL FUNCTION PANEL -   Lab Results   Component Value Date    GLUCOSE 182 (H) 11/19/2024     11/19/2024    K 3.9 11/19/2024     11/19/2024    CO2 28 11/19/2024    ANIONGAP 11 11/19/2024    BUN 18 11/19/2024    CREATININE 1.39 (H) 11/19/2024    GFRMALE 39 (A) 08/29/2022    CALCIUM 8.5 (L) 11/19/2024    PHOS 2.7 06/12/2024    ALBUMIN 3.9 11/06/2024        Lab Results   Component Value Date    HGBA1C 6.9 (A) 12/19/2021       Last Cardiology Tests:  11/01/2024 - Cardiac Catheterization ([facility])  Mild non-obstructive coronary artery disease.     10/07/2024 - Vascular Lab Renal Artery Duplex   1. Right Renal Artery: Right renal arteries demonstrate no evidence of hemodynamically significant stenosis. The  right renal veins are widely patent.  2. Left Renal Artery: Left renal arteries demonstrate no evidence of hemodynamically significant stenosis. The left renal veins are widely patent. Single renal cyst documented in the left kidney.  3. Additional Findings: Limited exam due to body habitus. All vessels are visualized from lateral decubitus. Bilateral renal artery origins are not visualized.    09/27/2024 - Stress Test  1. SPECT stress myocardial perfusion imaging in response to pharmacologic stress demonstrate a intermediate sized, mildly severe, partially reversible perfusion defect in the distal anterior wall and apex. This is consistent with evidence of mixture of ischemia or infarction. Well-maintained left ventricular function with an LV ejection fraction of 54%. Dilated ascending aorta with size ~ 5.2 cm. This is increased from prior imaging. Consider dedicated scan as indicated.  2. No clinical or electrocardiographic evidence for ischemia at maximal infusion.     09/27/2024 - TTE  1. Left ventricular ejection fraction is normal, by visual estimate at 60-65%.  2. Poorly visualized anatomical structures due to suboptimal image quality.  3. There is normal right ventricular global systolic function.    07/21/2023 - AAA U/S  No aneurysm involving visualized portions of the abdominal aorta measuring up to 2.3 cm in greatest cross-sectional dimension proximally, 1.9 cm in the mid abdominal aorta, and 1.8 cm in greatest  cross-sectional dimension distally. Neither common iliac artery was able to be seen.    05/27/2022 - AAA U/S  1. Limited study due to underpenetration and the presence of bowel gas overlying the aorta.  2. Ectatic distal abdominal aorta, measuring up to 2.8 cm.  3. Incidentally seen oval-shaped, hypoechoic structure in the mid abdomen measuring up to 9.4 cm which is of uncertain etiology and significance. Finding could represent a large renal cyst given a large cyst was seen in the left kidney on  prior CT 04/2019. However, further evaluation with CT of the abdomen and pelvis is recommended.    09/23/2020 - CT Lung Screening  1.  Very limited study due to patient's body habitus and low-dose technique causing significant beam hardening artifact.  2. Stable 3 mm nodule in the left upper lobe. Recommend follow-up chest CT in 12 months.  3. Stable appearance of a 1.8 cm left thyroid nodule.  4. Layering fluid in the mid esophagus. Correlation with reflux or motility disorder.  5. Again seen aneurysmal dilatation of the ascending thoracic aorta measuring 4.6 cm. This is unchanged compared to prior study from 2016 which measured 4.5 cm.  6. Dilated main pulmonary artery. Correlation with pulmonary artery hypertension.  7. Mild-to-moderate coronary artery calcification. Partially imaged soft tissue in the left upper quadrant of the abdomen and anterior to the left kidney which is likely a loop of bowel. However recommend correlation with renal ultrasound to exclude renal lesion.    08/12/2019 - CT Calcium Score  1. Coronary artery calcium score of 282*.  2. 3 mm nodule in the left upper lobe. If the patient is at risk of lung malignancy, consider follow-up chest CT in 12 months.  3. Phos for dilation of the ascending thoracic aorta measuring 4.4 cm.    Lab review: I have personally reviewed the laboratory result(s).  Diagnostic review: I have personally reviewed the result(s) of the Echocardiogram, Stress Test, Renal Artery U/S and LHC.     Assessment/Plan   1) HTN  On amlodipine 5 mg daily, HCTZ 25 mg daily, losartan 100 mg daily, metoprolol tartrate 75 mg BID  Longstanding h/o HTN   Progressively more difficult to manage  Reports frequent headaches  Denies LE edema  FH negative for HTN   Labs 09/26/2024 with negative catecholamines, elevated epinephrine of 67, negative aldosterone/renin activity  TTE 09/27/2024 with LVEF 60-65%, normal RV systolic function, mild dilatation of the ascending aorta (4.1 cm)  Renal  artery U/S 10/07/2024 negative for stenosis bilaterally.  BP tends to elevated as the day progresses toward the evening  Continue current medical Rx - advised to take losartan in the a.m. and amlodipine in the p.m. for more uniform BP coverage.  Followup with Jesika Hart NP, in 6 months.      2) Dilatation of Ascending Thoracic Aorta   CT lung screening 09/23/2020 with aneurysmal dilatation of the ascending thoracic aorta measuring 4.6 cm; stable from priory study from 2016   TTE 09/27/2024 with mild dilatation of the ascending aorta (4.1 cm)  Stress 09/27/2024 with dilated ascending aorta measuring ~5.2 cm    3) Elevated CT Calcium Score - Hyperlipidemia   On rosuvastatin 20 mg daily   CT calcium score 08/12/2019 with total score of 282  CT lung screening 09/23/2020 with mild-to-moderate coronary artery calcification  Lipid panel added to recent blood draw specimen   Followup with Jesika Hart NP, in 6 months    4) NALLELY  Intolerant of CPAP  Denies sleep disturbance, but reports daytime fatigue     5) CP and Exertional SOB - Mild CAD by Cath  On ASA 81 mg daily, amlodipine 5 mg daily, HCTZ 25 mg daily, losartan 100 mg daily, metoprolol tartrate 75 mg BID, rosuvastatin 20 mg daily.  Reports occasional chest discomfort and exertional SOB  TTE 09/27/2024 with LVEF 60-65%, normal RV systolic function, mild dilatation of the ascending aorta (4.1 cm)  Stress 09/27/2024 with intermediate sized, mildly severe, partially reversible perfusion defect in the distal anterior wall and apex consistent with evidence of mixture of ischemia or infarction and a dilated ascending aorta measuring ~5.2 cm.   [facility] 11/01/2024 with mild non-obstructive CAD  ED evaluation 11/06/2024 with bruising, pain and swelling to his right arm - workup negative for pseudoaneurysm.   Right arm pain nearly resolved  Continue current medical Rx   Followup with Jesika Hart NP, in 6 months      Scribe Attestation  By signing my name below, I, Desire  Tay Quintana   attest that this documentation has been prepared under the direction and in the presence of Wu Dwyer MD.

## 2024-11-22 ENCOUNTER — OFFICE VISIT (OUTPATIENT)
Dept: CARDIOLOGY | Facility: HOSPITAL | Age: 69
End: 2024-11-22
Payer: MEDICARE

## 2024-11-22 ENCOUNTER — TELEPHONE (OUTPATIENT)
Dept: GASTROENTEROLOGY | Facility: CLINIC | Age: 69
End: 2024-11-22

## 2024-11-22 VITALS
BODY MASS INDEX: 36.84 KG/M2 | SYSTOLIC BLOOD PRESSURE: 162 MMHG | OXYGEN SATURATION: 97 % | WEIGHT: 272 LBS | DIASTOLIC BLOOD PRESSURE: 80 MMHG | HEART RATE: 50 BPM | HEIGHT: 72 IN

## 2024-11-22 DIAGNOSIS — I25.10 ATHEROSCLEROSIS OF NATIVE CORONARY ARTERY OF NATIVE HEART WITHOUT ANGINA PECTORIS: Primary | ICD-10-CM

## 2024-11-22 DIAGNOSIS — R19.7 DIARRHEA, UNSPECIFIED TYPE: Primary | ICD-10-CM

## 2024-11-22 LAB
CHOLEST SERPL-MCNC: 109 MG/DL (ref 0–199)
CHOLESTEROL/HDL RATIO: 3.4
HDLC SERPL-MCNC: 32 MG/DL
LDLC SERPL CALC-MCNC: 41 MG/DL
NON HDL CHOLESTEROL: 77 MG/DL (ref 0–149)
TRIGL SERPL-MCNC: 182 MG/DL (ref 0–149)
VLDL: 36 MG/DL (ref 0–40)

## 2024-11-22 PROCEDURE — 3079F DIAST BP 80-89 MM HG: CPT | Performed by: INTERNAL MEDICINE

## 2024-11-22 PROCEDURE — 99213 OFFICE O/P EST LOW 20 MIN: CPT | Performed by: INTERNAL MEDICINE

## 2024-11-22 PROCEDURE — 3008F BODY MASS INDEX DOCD: CPT | Performed by: INTERNAL MEDICINE

## 2024-11-22 PROCEDURE — 1159F MED LIST DOCD IN RCRD: CPT | Performed by: INTERNAL MEDICINE

## 2024-11-22 PROCEDURE — 3077F SYST BP >= 140 MM HG: CPT | Performed by: INTERNAL MEDICINE

## 2024-11-22 PROCEDURE — 4010F ACE/ARB THERAPY RXD/TAKEN: CPT | Performed by: INTERNAL MEDICINE

## 2024-11-22 RX ORDER — POLYETHYLENE GLYCOL 3350, SODIUM SULFATE ANHYDROUS, SODIUM BICARBONATE, SODIUM CHLORIDE, POTASSIUM CHLORIDE 236; 22.74; 6.74; 5.86; 2.97 G/4L; G/4L; G/4L; G/4L; G/4L
4000 POWDER, FOR SOLUTION ORAL ONCE
Qty: 4000 ML | Refills: 0 | Status: SHIPPED | OUTPATIENT
Start: 2024-11-22 | End: 2024-11-22

## 2024-11-22 NOTE — LETTER
November 22, 2024     Ashvin Quinonez MD  [address] [address]    Patient: Kaveh Adkins   YOB: 1955   Date of Visit: 11/22/2024       Dear Dr. Ashvin Quinonez MD:    Thank you for referring Kaveh Adkins to me for evaluation. Below are my notes for this consultation.  If you have questions, please do not hesitate to call me. I look forward to following your patient along with you.       Sincerely,     Wu Dwyer MD      CC: No Recipients  ______________________________________________________________________________________    Counseling:  The patient was counseled regarding diagnostic results, instructions for management, risk factor reductions, prognosis, patient and family education, impressions, risks and benefits of treatment options and importance of compliance with treatment.      Chief Complaint:  The patient presents today for 2-month followup of HTN, chest pain and exertional SOB s/p renal artery U/S, echocardiogram, stress test and subsequent LHC.     History Of Present Illness:    Kaveh Adkins is a 69 y.o. male patient who presents today for 2-month followup of HTN, chest pain and exertional SOB s/p renal artery U/S, echocardiogram, stress test and subsequent LHC. His PMH is significant for HTN, DM type 2, hiatal hernia and NALLELY. On 09/27/2024, echocardiogram demonstrated an EF of 60-65%, normal RV systolic function and mild dilatation of the ascending aorta (4.1 cm), and stress testing revealed an intermediate sized, mildly severe, partially reversible perfusion defect in the distal anterior wall and apex consistent with evidence of mixture of ischemia or infarction and a dilated ascending aorta measuring ~5.2 cm. Renal artery U/S performed 10/07/2024 was negative for stenosis bilaterally. Based on abnormal stress testing, LHC was ordered, performed 11/01/2024, and revealed mild non-obstructive coronary artery disease. The patient presented to  the ED on 11/06/2024 with bruising, pain and swelling to his right arm. While in the ED, workup was negative for pseudoaneurysm. Today, the patient states that he is feeling well, having recovered from [facility]. The pain in his right arm has nearly resolved. Per the patient, his BP tends to elevated as the day progresses toward the evening. He is compliant with his prescribed medications.     Past Surgical History:  He has a past surgical history that includes Other surgical history (10/04/2021); Other surgical history (10/04/2021); Other surgical history (10/21/2022); Hernia repair (06/18/2018); Appendectomy (06/08/2024); Abdominal surgery; and Cardiac catheterization (N/A, 11/1/2024).      Social History:  He reports that he has been smoking cigars. He started smoking about 19 months ago. He has never used smokeless tobacco. He reports that he does not currently use alcohol. He reports that he does not use drugs.    Family History:  Family History   Problem Relation Name Age of Onset   • Colon cancer Other mat. great granfather 96        Allergies:  Cephalosporins, Ciprofloxacin, Cefuroxime axetil, Doxycycline, Furosemide, and Hydrochlorothiazide    Outpatient Medications:  Current Outpatient Medications   Medication Instructions   • acetaminophen (TYLENOL) 650 mg, oral, Every 6 hours PRN   • amLODIPine (NORVASC) 5 mg, oral, Daily   • aspirin 81 mg, Daily   • fluticasone (Flonase) 50 mcg/actuation nasal spray 2 sprays, Daily   • glipiZIDE (GLUCOTROL) 10 mg, Daily   • hydrALAZINE (APRESOLINE) 25 mg, oral, 3 times daily   • hydroCHLOROthiazide (HYDRODIURIL) 25 mg, oral, Daily   • losartan (COZAAR) 100 mg, oral, Daily   • metoprolol tartrate (LOPRESSOR) 75 mg, oral, 2 times daily   • OneTouch Ultra Test strip    • OneTouch Ultra2 Meter misc    • oxyCODONE (ROXICODONE) 5 mg, oral, Every 6 hours PRN   • rosuvastatin (Crestor) 20 mg tablet 1 tablet, Daily   • tamsulosin (Flomax) 0.4 mg 24 hr capsule 1 capsule, Daily         Last Recorded Vitals:  Vitals:    11/22/24 1028   BP: 162/80   BP Location: Left arm   Pulse: 50   SpO2: 97%   Weight: 123 kg (272 lb)   Height: 1.829 m (6')         Review of Systems   All other systems reviewed and are negative.     Physical Exam:  Constitutional:       Appearance: Healthy appearance. Not in distress.   Neck:      Vascular: No JVR. JVD normal.   Pulmonary:      Effort: Pulmonary effort is normal.      Breath sounds: Normal breath sounds. No wheezing. No rhonchi. No rales.   Chest:      Chest wall: Not tender to palpatation.   Cardiovascular:      PMI at left midclavicular line. Normal rate. Regular rhythm. Normal S1. Normal S2.       Murmurs: There is no murmur.      No gallop.  No click. No rub.   Pulses:     Intact distal pulses.   Edema:     Peripheral edema absent.   Abdominal:      General: Bowel sounds are normal.      Palpations: Abdomen is soft.      Tenderness: There is no abdominal tenderness.   Musculoskeletal: Normal range of motion.         General: No tenderness. Skin:     General: Skin is warm and dry.   Neurological:      General: No focal deficit present.      Mental Status: Alert and oriented to person, place and time.            Last Labs:  CBC -  Lab Results   Component Value Date    WBC 7.9 11/06/2024    HGB 14.5 11/06/2024    HCT 44.0 11/06/2024    MCV 91 11/06/2024     (L) 11/06/2024       CMP -  Lab Results   Component Value Date    CALCIUM 8.5 (L) 11/19/2024    PHOS 2.7 06/12/2024    PROT 6.4 11/06/2024    ALBUMIN 3.9 11/06/2024    AST 14 11/06/2024    ALT 8 (L) 11/06/2024    ALKPHOS 105 11/06/2024    BILITOT 0.7 11/06/2024       LIPID PANEL -   Lab Results   Component Value Date    CHOL 97 09/17/2021    TRIG 123 09/17/2021    HDL 36.4 (A) 09/17/2021    CHHDL 2.7 09/17/2021    LDLF 36 09/17/2021    VLDL 25 09/17/2021    NHDL 73 11/30/2019       RENAL FUNCTION PANEL -   Lab Results   Component Value Date    GLUCOSE 182 (H) 11/19/2024     11/19/2024    K 3.9  11/19/2024     11/19/2024    CO2 28 11/19/2024    ANIONGAP 11 11/19/2024    BUN 18 11/19/2024    CREATININE 1.39 (H) 11/19/2024    GFRMALE 39 (A) 08/29/2022    CALCIUM 8.5 (L) 11/19/2024    PHOS 2.7 06/12/2024    ALBUMIN 3.9 11/06/2024        Lab Results   Component Value Date    HGBA1C 6.9 (A) 12/19/2021       Last Cardiology Tests:  11/01/2024 - Cardiac Catheterization ([facility])  Mild non-obstructive coronary artery disease.     10/07/2024 - Vascular Lab Renal Artery Duplex   1. Right Renal Artery: Right renal arteries demonstrate no evidence of hemodynamically significant stenosis. The right renal veins are widely patent.  2. Left Renal Artery: Left renal arteries demonstrate no evidence of hemodynamically significant stenosis. The left renal veins are widely patent. Single renal cyst documented in the left kidney.  3. Additional Findings: Limited exam due to body habitus. All vessels are visualized from lateral decubitus. Bilateral renal artery origins are not visualized.    09/27/2024 - Stress Test  1. SPECT stress myocardial perfusion imaging in response to pharmacologic stress demonstrate a intermediate sized, mildly severe, partially reversible perfusion defect in the distal anterior wall and apex. This is consistent with evidence of mixture of ischemia or infarction. Well-maintained left ventricular function with an LV ejection fraction of 54%. Dilated ascending aorta with size ~ 5.2 cm. This is increased from prior imaging. Consider dedicated scan as indicated.  2. No clinical or electrocardiographic evidence for ischemia at maximal infusion.     09/27/2024 - TTE  1. Left ventricular ejection fraction is normal, by visual estimate at 60-65%.  2. Poorly visualized anatomical structures due to suboptimal image quality.  3. There is normal right ventricular global systolic function.    07/21/2023 - AAA U/S  No aneurysm involving visualized portions of the abdominal aorta measuring up to 2.3 cm in greatest  cross-sectional dimension proximally, 1.9 cm in the mid abdominal aorta, and 1.8 cm in greatest  cross-sectional dimension distally. Neither common iliac artery was able to be seen.    05/27/2022 - AAA U/S  1. Limited study due to underpenetration and the presence of bowel gas overlying the aorta.  2. Ectatic distal abdominal aorta, measuring up to 2.8 cm.  3. Incidentally seen oval-shaped, hypoechoic structure in the mid abdomen measuring up to 9.4 cm which is of uncertain etiology and significance. Finding could represent a large renal cyst given a large cyst was seen in the left kidney on prior CT 04/2019. However, further evaluation with CT of the abdomen and pelvis is recommended.    09/23/2020 - CT Lung Screening  1.  Very limited study due to patient's body habitus and low-dose technique causing significant beam hardening artifact.  2. Stable 3 mm nodule in the left upper lobe. Recommend follow-up chest CT in 12 months.  3. Stable appearance of a 1.8 cm left thyroid nodule.  4. Layering fluid in the mid esophagus. Correlation with reflux or motility disorder.  5. Again seen aneurysmal dilatation of the ascending thoracic aorta measuring 4.6 cm. This is unchanged compared to prior study from 2016 which measured 4.5 cm.  6. Dilated main pulmonary artery. Correlation with pulmonary artery hypertension.  7. Mild-to-moderate coronary artery calcification. Partially imaged soft tissue in the left upper quadrant of the abdomen and anterior to the left kidney which is likely a loop of bowel. However recommend correlation with renal ultrasound to exclude renal lesion.    08/12/2019 - CT Calcium Score  1. Coronary artery calcium score of 282*.  2. 3 mm nodule in the left upper lobe. If the patient is at risk of lung malignancy, consider follow-up chest CT in 12 months.  3. Phos for dilation of the ascending thoracic aorta measuring 4.4 cm.    Lab review: I have personally reviewed the laboratory result(s).  Diagnostic  review: I have personally reviewed the result(s) of the Echocardiogram, Stress Test, Renal Artery U/S and [facility].     Assessment/Plan  1) HTN  On amlodipine 5 mg daily, HCTZ 25 mg daily, losartan 100 mg daily, metoprolol tartrate 75 mg BID  Longstanding h/o HTN   Progressively more difficult to manage  Reports frequent headaches  Denies LE edema  FH negative for HTN   Labs 09/26/2024 with negative catecholamines, elevated epinephrine of 67, negative aldosterone/renin activity  TTE 09/27/2024 with LVEF 60-65%, normal RV systolic function, mild dilatation of the ascending aorta (4.1 cm)  Renal artery U/S 10/07/2024 negative for stenosis bilaterally.  BP tends to elevated as the day progresses toward the evening  Continue current medical Rx - advised to take losartan in the a.m. and amlodipine in the p.m. for more uniform BP coverage.  Followup with Jesika Hart NP, in 6 months.      2) Dilatation of Ascending Thoracic Aorta   CT lung screening 09/23/2020 with aneurysmal dilatation of the ascending thoracic aorta measuring 4.6 cm; stable from priory study from 2016   TTE 09/27/2024 with mild dilatation of the ascending aorta (4.1 cm)  Stress 09/27/2024 with dilated ascending aorta measuring ~5.2 cm    3) Elevated CT Calcium Score - Hyperlipidemia   On rosuvastatin 20 mg daily   CT calcium score 08/12/2019 with total score of 282  CT lung screening 09/23/2020 with mild-to-moderate coronary artery calcification  Lipid panel added to recent blood draw specimen   Followup with Jesika Hart NP, in 6 months    4) NALLELY  Intolerant of CPAP  Denies sleep disturbance, but reports daytime fatigue     5) CP and Exertional SOB - Mild CAD by Cath  On ASA 81 mg daily, amlodipine 5 mg daily, HCTZ 25 mg daily, losartan 100 mg daily, metoprolol tartrate 75 mg BID, rosuvastatin 20 mg daily.  Reports occasional chest discomfort and exertional SOB  TTE 09/27/2024 with LVEF 60-65%, normal RV systolic function, mild dilatation of the  ascending aorta (4.1 cm)  Stress 09/27/2024 with intermediate sized, mildly severe, partially reversible perfusion defect in the distal anterior wall and apex consistent with evidence of mixture of ischemia or infarction and a dilated ascending aorta measuring ~5.2 cm.   C 11/01/2024 with mild non-obstructive CAD  ED evaluation 11/06/2024 with bruising, pain and swelling to his right arm - workup negative for pseudoaneurysm.   Right arm pain nearly resolved  Continue current medical Rx   Followup with Jesika Hart NP, in 6 months      Scribe Attestation  By signing my name below, I, Erin Quintana, Scribmichele   attest that this documentation has been prepared under the direction and in the presence of Wu Dwyer MD.

## 2024-11-22 NOTE — PATIENT INSTRUCTIONS
Continue all current medications as prescribed. Please take the losartan in the morning and the amlodipine in the evening.   Dr. Dwyer is going to try and have a lipid panel added to your most recent blood work.   Followup with Jesika Hart NP, in 6 months.      If you have any questions or cardiac concerns, please call our office at [phone].

## 2024-11-22 NOTE — TELEPHONE ENCOUNTER
----- Message from Heidi Aguilar sent at 11/4/2024  1:03 PM EST -----  Regarding: RE: cards appt -- htn  Pt had heart cath. Follow up with Reymundo scheduled 11/22/24  ----- Message -----  From: NAA Fu  Sent: 9/17/2024   4:17 PM EST  To: NAA Fu  Subject: RE: cards appt -- htn                            Testing ordered by reymundo; follow up with cardiology 10/15/24  ----- Message -----  From: NAA Fu  Sent: 9/13/2024   8:00 AM EDT  To: NAA Fu  Subject: cards appt -- htn                                Pt has appt 9/12 with reymundo for HTN

## 2024-12-19 ENCOUNTER — OFFICE VISIT (OUTPATIENT)
Dept: CARDIOLOGY | Facility: CLINIC | Age: 69
End: 2024-12-19
Payer: MEDICARE

## 2024-12-19 VITALS
TEMPERATURE: 97.6 F | WEIGHT: 269 LBS | DIASTOLIC BLOOD PRESSURE: 69 MMHG | SYSTOLIC BLOOD PRESSURE: 148 MMHG | HEART RATE: 56 BPM | HEIGHT: 72 IN | BODY MASS INDEX: 36.44 KG/M2

## 2024-12-19 DIAGNOSIS — I10 PRIMARY HYPERTENSION: ICD-10-CM

## 2024-12-19 DIAGNOSIS — I25.10 ATHEROSCLEROSIS OF NATIVE CORONARY ARTERY OF NATIVE HEART WITHOUT ANGINA PECTORIS: ICD-10-CM

## 2024-12-19 DIAGNOSIS — Z86.79 HISTORY OF HYPERTENSION: ICD-10-CM

## 2024-12-19 DIAGNOSIS — E78.2 MIXED HYPERLIPIDEMIA: ICD-10-CM

## 2024-12-19 DIAGNOSIS — I71.21 ANEURYSM OF ASCENDING AORTA WITHOUT RUPTURE (CMS-HCC): ICD-10-CM

## 2024-12-19 PROCEDURE — 99214 OFFICE O/P EST MOD 30 MIN: CPT | Performed by: INTERNAL MEDICINE

## 2024-12-19 PROCEDURE — 3077F SYST BP >= 140 MM HG: CPT | Performed by: INTERNAL MEDICINE

## 2024-12-19 PROCEDURE — 1159F MED LIST DOCD IN RCRD: CPT | Performed by: INTERNAL MEDICINE

## 2024-12-19 PROCEDURE — 3008F BODY MASS INDEX DOCD: CPT | Performed by: INTERNAL MEDICINE

## 2024-12-19 PROCEDURE — 3078F DIAST BP <80 MM HG: CPT | Performed by: INTERNAL MEDICINE

## 2024-12-19 PROCEDURE — 3048F LDL-C <100 MG/DL: CPT | Performed by: INTERNAL MEDICINE

## 2024-12-19 PROCEDURE — 4010F ACE/ARB THERAPY RXD/TAKEN: CPT | Performed by: INTERNAL MEDICINE

## 2024-12-19 RX ORDER — LOSARTAN POTASSIUM 100 MG/1
100 TABLET ORAL DAILY
Qty: 90 TABLET | Refills: 3 | Status: SHIPPED | OUTPATIENT
Start: 2024-12-19 | End: 2025-12-19

## 2024-12-19 NOTE — PROGRESS NOTES
Chief Complaint:   No chief complaint on file.     History of Present Illness     Kaveh Adkins is a 69 y.o. male presenting with coronary artery disease.  Kaveh initially presented with atypical chest pain and has been treated with medical therapy for mild CAD 11/1/24.  He has not undergone revascularization.  The patient is tolerating guideline-directed medical therapy with antiplatelet (ASA) and statin medication and is compliant.  The patient does not exercise regularly.  The patient is not having any anginal symptoms or dyspnea on exertion.  Dx 4.4 cm TAA on recent CT.  C/O HA and BP has been elevated. On 11/12/24 added amlodipine and changed to hydrochlorothiazide.  Was on ARB as well.  Saw Dr. Dwyer 11/22/24 /80. Advised to take Losartan in AM but patient sates pharmacy stopped it.     Review of symptoms  All pertinent systems have been reviewed and are negative except for what is stated in the history of present illness.     All other systems have been reviewed and are negative and noncontributory to this patient's current ailments.       Previous History     Past Medical History:  He has a past medical history of Aortic aneurysm of unspecified site, without rupture (CMS-HCC), Cancer (Multi), Essential (primary) hypertension (12/04/2013), Personal history of malignant neoplasm, unspecified, Personal history of other diseases of the circulatory system, Personal history of other endocrine, nutritional and metabolic disease, Personal history of other specified conditions (06/18/2018), and Type 2 diabetes mellitus without complications (Multi) (12/04/2013).    Past Surgical History:  He has a past surgical history that includes Other surgical history (10/04/2021); Other surgical history (10/04/2021); Other surgical history (10/21/2022); Hernia repair (06/18/2018); Appendectomy (06/08/2024); Abdominal surgery; and Cardiac catheterization (N/A, 11/1/2024).      Social History:  He reports that he has  been smoking cigars. He started smoking about 20 months ago. He has never used smokeless tobacco. He reports that he does not currently use alcohol. He reports that he does not use drugs.    Family History:  Family History   Problem Relation Name Age of Onset    Colon cancer Other mat. great granfather 96        Allergies:  Cephalosporins, Ciprofloxacin, Cefuroxime axetil, Doxycycline, Furosemide, and Hydrochlorothiazide    Outpatient Medications:  Current Outpatient Medications   Medication Instructions    acetaminophen (TYLENOL) 650 mg, oral, Every 6 hours PRN    amLODIPine (NORVASC) 5 mg, oral, Daily    aspirin 81 mg, Daily    fluticasone (Flonase) 50 mcg/actuation nasal spray 2 sprays, Daily    glipiZIDE (GLUCOTROL) 10 mg, Daily    hydroCHLOROthiazide (HYDRODIURIL) 25 mg, oral, Daily    losartan (COZAAR) 100 mg, oral, Daily    metoprolol tartrate (LOPRESSOR) 75 mg, oral, 2 times daily    OneTouch Ultra Test strip     OneTouch Ultra2 Meter misc     rosuvastatin (Crestor) 20 mg tablet 1 tablet, Daily    tamsulosin (Flomax) 0.4 mg 24 hr capsule 1 capsule, Daily       Physical Examination   Vitals:  Visit Vitals  /69 (BP Location: Right arm)   Pulse 56   Temp 36.4 °C (97.6 °F)   Ht 1.829 m (6')   Wt 122 kg (269 lb)   BMI 36.48 kg/m²   Smoking Status Some Days   BSA 2.49 m²    Physical Exam  Vitals reviewed.   Constitutional:       General: He is not in acute distress.     Appearance: Normal appearance.   HENT:      Head: Normocephalic and atraumatic.      Nose: Nose normal.   Eyes:      Conjunctiva/sclera: Conjunctivae normal.   Cardiovascular:      Rate and Rhythm: Normal rate and regular rhythm.      Pulses: Normal pulses.      Heart sounds: No murmur heard.  Pulmonary:      Effort: Pulmonary effort is normal. No respiratory distress.      Breath sounds: Normal breath sounds. No wheezing, rhonchi or rales.   Abdominal:      General: Bowel sounds are normal. There is no distension.      Palpations: Abdomen is  soft.      Tenderness: There is no abdominal tenderness.   Musculoskeletal:         General: No swelling.      Right lower leg: No edema.      Left lower leg: No edema.   Skin:     General: Skin is warm and dry.      Capillary Refill: Capillary refill takes less than 2 seconds.   Neurological:      General: No focal deficit present.      Mental Status: He is alert.   Psychiatric:         Mood and Affect: Mood normal.             Labs/Imaging/Cardiac Studies   I have personally reviewed the patient's available lab work, primary care appointment notes, pertinent imaging studies, and cardiac studies and have discussed them and my independent interpretation of those results with the patient and caregiver at this appointment.  All pertinent recent Emergency Department evaluations and Hospital admissions were also reviewed in detail with the patient and caregiver.    Reviewed cards note and Labs    Echo:  Transthoracic echo (TTE) complete    Result Date: 9/29/2024              [place] [address]      Phone [phone] Fax [phone] TRANSTHORACIC ECHOCARDIOGRAM REPORT Patient Name:      BLAKE Jones Physician:    [id number] Oscar Leon MD Study Date:        9/27/2024            Ordering Provider:    [id number] LUCAS MRN/PID:           [id number]             Fellow: Accession#:        [id number]         Nurse:                Nadege Khan RN Date of Birth/Age: 1955 / 69 years Sonographer:          Mary Key RDCS Gender:            M                    Additional Staff: Height:            182.88 cm            Admit Date:           9/27/2024 Weight:            120.20 kg            Admission Status:     Outpatient BSA / BMI:         2.40 m2 / 35.94      Department Location:  [facility] Echo                    kg/m2                                      Lab Blood Pressure: 152 /88 mmHg Study  Type:    TRANSTHORACIC ECHO (TTE) COMPLETE Diagnosis/ICD: Chest pain, unspecified-R07.9 Indication:    Hypertension CPT Codes:     Echo Complete w Full [id number] Patient History: Pertinent History: HTN, Hyperlipidemia and CAD. Study Detail: The following Echo studies were performed: 2D, M-Mode, Doppler and               color flow. Technically challenging study due to body habitus.               Definity used as a contrast agent for endocardial border               definition. Total contrast used for this procedure was 2 mL via IV               push.  PHYSICIAN INTERPRETATION: Left Ventricle: Left ventricular ejection fraction is normal, by visual estimate at 60-65%. There are no regional wall motion abnormalities. The left ventricular cavity size is normal. The left ventricular septal wall thickness is mildly increased. There is mildly increased left ventricular posterior wall thickness. Spectral Doppler shows a normal pattern of left ventricular diastolic filling. Left Atrium: The left atrium is normal in size. Right Ventricle: The right ventricle is normal in size. There is normal right ventricular global systolic function. Right Atrium: The right atrium is normal in size. Aortic Valve: The aortic valve is trileaflet. The aortic valve dimensionless index is 0.86. There is trace aortic valve regurgitation. The mean gradient of the aortic valve is 3.0 mmHg. Mitral Valve: The mitral valve is normal in structure. There is no evidence of mitral valve regurgitation. Tricuspid Valve: The tricuspid valve is structurally normal. No evidence of tricuspid regurgitation. The right ventricular systolic pressure is unable to be estimated. Pulmonic Valve: The pulmonic valve is structurally normal. There is no indication of pulmonic valve regurgitation. Pericardium: No pericardial effusion noted. Aorta: The aortic root is normal. There is mild dilatation of the ascending aorta.  CONCLUSIONS:  1. Left ventricular ejection  fraction is normal, by visual estimate at 60-65%.  2. Poorly visualized anatomical structures due to suboptimal image quality.  3. There is normal right ventricular global systolic function. QUANTITATIVE DATA SUMMARY:  2D MEASUREMENTS:            Normal Ranges: LAs:             4.00 cm    (2.7-4.0cm) IVSd:            1.30 cm    (0.6-1.1cm) LVPWd:           1.30 cm    (0.6-1.1cm) LVIDd:           5.90 cm    (3.9-5.9cm) LVIDs:           3.50 cm LV Mass Index:   141.9 g/m2 LV % FS          40.7 %  LA VOLUME:                    Normal Ranges: LA Vol A4C:        42.7 ml    (22+/-6mL/m2) LA Vol A2C:        67.1 ml LA Vol BP:         58.8 ml LA Vol Index A4C:  17.8ml/m2 LA Vol Index A2C:  28.0 ml/m2 LA Vol Index BP:   24.5 ml/m2 LA Area A4C:       16.7 cm2 LA Area A2C:       23.0 cm2 LA Major Axis A4C: 5.6 cm LA Major Axis A2C: 6.7 cm LA Volume Index:   24.5 ml/m2  RA VOLUME BY A/L METHOD:          Normal Ranges: RA Area A4C:             17.6 cm2  AORTA MEASUREMENTS:         Normal Ranges: Ao Sinus, d:        3.68 cm (2.1-3.5cm) Ao STJ, d:          3.87 cm (1.7-3.4cm) Asc Ao, d:          4.10 cm (2.1-3.4cm)  LV SYSTOLIC FUNCTION BY 2D PLANIMETRY (MOD):                      Normal Ranges: EF-A4C View:    73 % (>=55%) EF-A2C View:    61 % EF-Biplane:     68 % EF-Visual:      63 % LV EF Reported: 63 %  LV DIASTOLIC FUNCTION:           Normal Ranges: MV Peak E:             0.56 m/s  (0.7-1.2 m/s) MV Peak A:             1.00 m/s  (0.42-0.7 m/s) E/A Ratio:             0.56      (1.0-2.2) MV e'                  0.075 m/s (>8.0) MV lateral e'          0.08 m/s MV medial e'           0.07 m/s E/e' Ratio:            7.49      (<8.0)  MITRAL VALVE:          Normal Ranges: MV DT:        401 msec (150-240msec)  AORTIC VALVE:                     Normal Ranges: AoV Mean PG:             3.0 mmHg (1.7-11.5mmHg) LVOT Max Jamie:            1.16 m/s (<=1.1m/s) AoV VTI:                 27.60 cm (18-25cm) LVOT VTI:                23.70 cm LVOT  Diameter:           2.00 cm  (1.8-2.4cm) AoV Area, VTI:           2.70 cm2 (2.5-5.5cm2) AoV Dimensionless Index: 0.86  RIGHT VENTRICLE: RV Basal 3.80 cm RV Mid   3.90 cm RV Major 8.0 cm TAPSE:   28.0 mm RV s'    0.11 m/s  [id number] Oscar Leon MD Electronically signed on 9/29/2024 at 9:50:38 PM  ** Final **         Assessment and Recommendations     Assessment/Plan       1. Atherosclerosis of native coronary artery of native heart without angina pectoris  Mild CAD - no angina.  - Follow Up In Cardiology    2. History of hypertension  Improved and feels better.  Move ARB to AM to control PM hypertension.  - Follow Up In Cardiology    3. Mixed hyperlipidemia  The patient's lipids are well controlled on chronic rosuvastatin therapy and they are meeting their goal LDL cholesterol per the ACC/AHA guidelines.      - Follow Up In Cardiology    4. Aneurysm of ascending aorta without rupture (CMS-HCC)  Stable and asymptomatic ascending aortic aneurysm with unchanged size of the aneurysm by recent imaging.  There is no indication for surgical repair. The patient is tolerating their anti-hypertensive medications including beta blocker and/or ACE/ARB when appropriate to limit expansion and is achieving a goal blood pressure of less than 130/80.  The patient will be schedule for annual surveillance imaging.    - Follow Up In Cardiology     Kaveh Adkins will return in 6 months for an office visit.       Rao Ellison MD    Exclusive of any other services or procedures performed, I, Rao Ellison MD , spent 30 minutes in duration for this visit today.  This time consisted of chart review, obtaining history, and/or performing the exam as documented above as well as documenting the clinical information for the encounter in the electronic record, discussing treatment options, plans, and/or goals with patient, family, and/or caregiver, refilling medications, updating the electronic record, ordering medicines, lab work, imaging,  referrals, and/or procedures as documented above and communicating with other helthcare professionals. I have discussed the results of laboratory, radiology, and cardiology studies with the patient and their family/caregiver.

## 2025-02-26 ENCOUNTER — APPOINTMENT (OUTPATIENT)
Dept: PRIMARY CARE | Facility: CLINIC | Age: 70
End: 2025-02-26
Payer: MEDICARE

## 2025-02-26 VITALS
HEART RATE: 61 BPM | DIASTOLIC BLOOD PRESSURE: 62 MMHG | BODY MASS INDEX: 36.57 KG/M2 | HEIGHT: 72 IN | WEIGHT: 270 LBS | SYSTOLIC BLOOD PRESSURE: 120 MMHG

## 2025-02-26 DIAGNOSIS — E11.65 TYPE 2 DIABETES MELLITUS WITH HYPERGLYCEMIA, WITHOUT LONG-TERM CURRENT USE OF INSULIN: ICD-10-CM

## 2025-02-26 DIAGNOSIS — I10 BENIGN ESSENTIAL HYPERTENSION: Primary | ICD-10-CM

## 2025-02-26 DIAGNOSIS — E66.01 MORBID OBESITY (MULTI): ICD-10-CM

## 2025-02-26 DIAGNOSIS — E78.2 MIXED HYPERLIPIDEMIA: ICD-10-CM

## 2025-02-26 DIAGNOSIS — Z00.00 MEDICARE ANNUAL WELLNESS VISIT, SUBSEQUENT: ICD-10-CM

## 2025-02-26 DIAGNOSIS — Z76.89 ENCOUNTER TO ESTABLISH CARE: ICD-10-CM

## 2025-02-26 DIAGNOSIS — N18.30 STAGE 3 CHRONIC KIDNEY DISEASE, UNSPECIFIED WHETHER STAGE 3A OR 3B CKD (MULTI): ICD-10-CM

## 2025-02-26 DIAGNOSIS — R06.83 SNORING: ICD-10-CM

## 2025-02-26 DIAGNOSIS — E55.9 VITAMIN D DEFICIENCY: ICD-10-CM

## 2025-02-26 DIAGNOSIS — G47.10 HYPERSOMNIA: ICD-10-CM

## 2025-02-26 PROCEDURE — 1159F MED LIST DOCD IN RCRD: CPT | Performed by: CLINICAL NURSE SPECIALIST

## 2025-02-26 PROCEDURE — 4004F PT TOBACCO SCREEN RCVD TLK: CPT | Performed by: CLINICAL NURSE SPECIALIST

## 2025-02-26 PROCEDURE — 1170F FXNL STATUS ASSESSED: CPT | Performed by: CLINICAL NURSE SPECIALIST

## 2025-02-26 PROCEDURE — G0009 ADMIN PNEUMOCOCCAL VACCINE: HCPCS | Performed by: CLINICAL NURSE SPECIALIST

## 2025-02-26 PROCEDURE — 3074F SYST BP LT 130 MM HG: CPT | Performed by: CLINICAL NURSE SPECIALIST

## 2025-02-26 PROCEDURE — G0439 PPPS, SUBSEQ VISIT: HCPCS | Performed by: CLINICAL NURSE SPECIALIST

## 2025-02-26 PROCEDURE — 3078F DIAST BP <80 MM HG: CPT | Performed by: CLINICAL NURSE SPECIALIST

## 2025-02-26 PROCEDURE — 1124F ACP DISCUSS-NO DSCNMKR DOCD: CPT | Performed by: CLINICAL NURSE SPECIALIST

## 2025-02-26 PROCEDURE — 3008F BODY MASS INDEX DOCD: CPT | Performed by: CLINICAL NURSE SPECIALIST

## 2025-02-26 PROCEDURE — 99204 OFFICE O/P NEW MOD 45 MIN: CPT | Performed by: CLINICAL NURSE SPECIALIST

## 2025-02-26 PROCEDURE — 90656 IIV3 VACC NO PRSV 0.5 ML IM: CPT | Performed by: CLINICAL NURSE SPECIALIST

## 2025-02-26 PROCEDURE — 1160F RVW MEDS BY RX/DR IN RCRD: CPT | Performed by: CLINICAL NURSE SPECIALIST

## 2025-02-26 PROCEDURE — G0444 DEPRESSION SCREEN ANNUAL: HCPCS | Performed by: CLINICAL NURSE SPECIALIST

## 2025-02-26 PROCEDURE — G0008 ADMIN INFLUENZA VIRUS VAC: HCPCS | Performed by: CLINICAL NURSE SPECIALIST

## 2025-02-26 PROCEDURE — 4010F ACE/ARB THERAPY RXD/TAKEN: CPT | Performed by: CLINICAL NURSE SPECIALIST

## 2025-02-26 PROCEDURE — 90677 PCV20 VACCINE IM: CPT | Performed by: CLINICAL NURSE SPECIALIST

## 2025-02-26 RX ORDER — ROSUVASTATIN CALCIUM 20 MG/1
20 TABLET, COATED ORAL DAILY
Qty: 90 TABLET | Refills: 3 | Status: SHIPPED | OUTPATIENT
Start: 2025-02-26 | End: 2026-02-21

## 2025-02-26 RX ORDER — GLIPIZIDE 10 MG/1
10 TABLET ORAL DAILY
Qty: 90 TABLET | Refills: 3 | Status: SHIPPED | OUTPATIENT
Start: 2025-02-26 | End: 2026-02-21

## 2025-02-26 ASSESSMENT — ENCOUNTER SYMPTOMS
ABDOMINAL PAIN: 0
NECK PAIN: 0
MYALGIAS: 0
VOMITING: 0
SLEEP DISTURBANCE: 0
BRUISES/BLEEDS EASILY: 0
TROUBLE SWALLOWING: 0
SEIZURES: 0
CONFUSION: 0
PHOTOPHOBIA: 0
DIZZINESS: 0
APPETITE CHANGE: 0
LOSS OF SENSATION IN FEET: 0
NAUSEA: 0
HEADACHES: 0
ARTHRALGIAS: 0
FEVER: 0
BLOOD IN STOOL: 0
DYSURIA: 0
EYE PAIN: 0
BACK PAIN: 0
OCCASIONAL FEELINGS OF UNSTEADINESS: 0
SORE THROAT: 0
JOINT SWELLING: 0
WHEEZING: 0
ACTIVITY CHANGE: 0
PALPITATIONS: 0
FATIGUE: 0
CHILLS: 0
CONSTIPATION: 0
DEPRESSION: 0
FLANK PAIN: 0
CHEST TIGHTNESS: 0
POLYDIPSIA: 0
DIARRHEA: 0
SHORTNESS OF BREATH: 0
HEMATURIA: 0
WOUND: 0
UNEXPECTED WEIGHT CHANGE: 0
COUGH: 0

## 2025-02-26 ASSESSMENT — SLEEP AND FATIGUE QUESTIONNAIRES
HOW LIKELY ARE YOU TO NOD OFF OR FALL ASLEEP WHILE WATCHING TV: HIGH CHANCE OF DOZING
HOW LIKELY ARE YOU TO NOD OFF OR FALL ASLEEP WHILE SITTING QUIETLY AFTER LUNCH WITHOUT ALCOHOL: HIGH CHANCE OF DOZING
HOW LIKELY ARE YOU TO NOD OFF OR FALL ASLEEP IN A CAR, WHILE STOPPED FOR A FEW MINUTES IN TRAFFIC: WOULD NEVER DOZE
ESS TOTAL SCORE: 17
HOW LIKELY ARE YOU TO NOD OFF OR FALL ASLEEP WHILE LYING DOWN TO REST IN THE AFTERNOON WHEN CIRCUMSTANCES PERMIT: HIGH CHANCE OF DOZING
HOW LIKELY ARE YOU TO NOD OFF OR FALL ASLEEP WHEN YOU ARE A PASSENGER IN A CAR FOR AN HOUR WITHOUT A BREAK: MODERATE CHANCE OF DOZING
HOW LIKELY ARE YOU TO NOD OFF OR FALL ASLEEP WHILE SITTING INACTIVE IN A PUBLIC PLACE: HIGH CHANCE OF DOZING
HOW LIKELY ARE YOU TO NOD OFF OR FALL ASLEEP WHILE SITTING AND READING: HIGH CHANCE OF DOZING
HOW LIKELY ARE YOU TO NOD OFF OR FALL ASLEEP WHILE SITTING AND TALKING TO SOMEONE: WOULD NEVER DOZE

## 2025-02-26 ASSESSMENT — PATIENT HEALTH QUESTIONNAIRE - PHQ9
SUM OF ALL RESPONSES TO PHQ9 QUESTIONS 1 AND 2: 0
2. FEELING DOWN, DEPRESSED OR HOPELESS: NOT AT ALL
1. LITTLE INTEREST OR PLEASURE IN DOING THINGS: NOT AT ALL

## 2025-02-26 ASSESSMENT — ACTIVITIES OF DAILY LIVING (ADL)
GROCERY_SHOPPING: INDEPENDENT
BATHING: INDEPENDENT
DRESSING: INDEPENDENT
DOING_HOUSEWORK: INDEPENDENT
MANAGING_FINANCES: INDEPENDENT
TAKING_MEDICATION: INDEPENDENT

## 2025-02-26 ASSESSMENT — COLUMBIA-SUICIDE SEVERITY RATING SCALE - C-SSRS
1. IN THE PAST MONTH, HAVE YOU WISHED YOU WERE DEAD OR WISHED YOU COULD GO TO SLEEP AND NOT WAKE UP?: NO
6. HAVE YOU EVER DONE ANYTHING, STARTED TO DO ANYTHING, OR PREPARED TO DO ANYTHING TO END YOUR LIFE?: NO
2. HAVE YOU ACTUALLY HAD ANY THOUGHTS OF KILLING YOURSELF?: NO

## 2025-02-26 NOTE — ASSESSMENT & PLAN NOTE
Orders:    rosuvastatin (Crestor) 20 mg tablet; Take 1 tablet (20 mg) by mouth once daily.    CBC; Future    Comprehensive Metabolic Panel; Future    Hepatitis C Antibody; Future    Lipid Panel; Future    TSH with reflex to Free T4 if abnormal; Future    Vitamin B12; Future    Hemoglobin A1C; Future    Albumin-Creatinine Ratio, Urine Random; Future    Vitamin D 25-Hydroxy,Total (for eval of Vitamin D levels); Future    Follow Up In [facility] - PCP - Established; Future

## 2025-02-26 NOTE — ASSESSMENT & PLAN NOTE
Orders:    glipiZIDE (Glucotrol) 10 mg tablet; Take 1 tablet (10 mg) by mouth once daily.    CBC; Future    Comprehensive Metabolic Panel; Future    Hepatitis C Antibody; Future    Lipid Panel; Future    TSH with reflex to Free T4 if abnormal; Future    Vitamin B12; Future    Hemoglobin A1C; Future    Albumin-Creatinine Ratio, Urine Random; Future    Vitamin D 25-Hydroxy,Total (for eval of Vitamin D levels); Future    Follow Up In [facility] - PCP - Established; Future

## 2025-02-26 NOTE — ASSESSMENT & PLAN NOTE
Orders:    CBC; Future    Comprehensive Metabolic Panel; Future    Hepatitis C Antibody; Future    Lipid Panel; Future    TSH with reflex to Free T4 if abnormal; Future    Vitamin B12; Future    Hemoglobin A1C; Future    Albumin-Creatinine Ratio, Urine Random; Future    Vitamin D 25-Hydroxy,Total (for eval of Vitamin D levels); Future    Follow Up In [facility] - PCP - Established; Future

## 2025-02-26 NOTE — PROGRESS NOTES
Subjective   Reason for Visit: Kaveh Adkins is an 69 y.o. male here for a Medicare Wellness visit.     Past Medical, Surgical, and Family History reviewed and updated in chart.    Reviewed all medications by prescribing practitioner or clinical pharmacist (such as prescriptions, OTCs, herbal therapies and supplements) and documented in the medical record.    HPI    New patient here today to establish care.  Due for Medicare Wellness. Previously followed with [facility] for Primary care. Last blood work done around August 2024 at [facility].     Followed with Dr. Ellison for Cardiology. Follow up appointment scheduled. Planning to establish with Nephrology due to persistently elevated Creatinine.     Chronic issues with Arthritis and lower back discomfort.     Diagnosed with Apnea possibly 20+ years ago. Previously not compliant with CPAP and has not had any further testing since that time period.     Patient Care Team:  YIN Shah-CNS as PCP - General (Internal Medicine)  Rao Ellison MD as Consulting Physician (Cardiology)     Review of Systems   Constitutional:  Negative for activity change, appetite change, chills, fatigue, fever and unexpected weight change.   HENT:  Negative for ear pain, hearing loss, nosebleeds, sore throat, tinnitus and trouble swallowing.    Eyes:  Negative for photophobia, pain and visual disturbance.   Respiratory:  Negative for cough, chest tightness, shortness of breath and wheezing.    Cardiovascular:  Negative for chest pain, palpitations and leg swelling.   Gastrointestinal:  Negative for abdominal pain, blood in stool, constipation, diarrhea, nausea and vomiting.   Endocrine: Negative for cold intolerance, heat intolerance, polydipsia and polyuria.   Genitourinary:  Negative for dysuria, flank pain and hematuria.   Musculoskeletal:  Negative for arthralgias, back pain, joint swelling, myalgias and neck pain.   Skin:  Negative for pallor, rash and wound.   Allergic/Immunologic:  Negative for immunocompromised state.   Neurological:  Negative for dizziness, seizures and headaches.   Hematological:  Does not bruise/bleed easily.   Psychiatric/Behavioral:  Negative for confusion and sleep disturbance.        Objective   Vitals:  /62 (BP Location: Left arm)   Pulse 61   Ht 1.829 m (6')   Wt 122 kg (270 lb)   BMI 36.62 kg/m²       Physical Exam  Vitals and nursing note reviewed.   Constitutional:       General: He is not in acute distress.     Appearance: Normal appearance.   HENT:      Head: Normocephalic.      Nose: Nose normal.   Eyes:      Conjunctiva/sclera: Conjunctivae normal.   Neck:      Vascular: No carotid bruit.   Cardiovascular:      Rate and Rhythm: Normal rate and regular rhythm.      Pulses: Normal pulses.      Heart sounds: Normal heart sounds.   Pulmonary:      Effort: Pulmonary effort is normal.      Breath sounds: Normal breath sounds.   Abdominal:      General: Bowel sounds are normal.      Palpations: Abdomen is soft.   Musculoskeletal:         General: Normal range of motion.      Cervical back: Normal range of motion.   Skin:     General: Skin is warm and dry.   Neurological:      Mental Status: He is alert and oriented to person, place, and time. Mental status is at baseline.   Psychiatric:         Mood and Affect: Mood normal.         Behavior: Behavior normal.       Assessment & Plan  Type 2 diabetes mellitus with hyperglycemia, without long-term current use of insulin    Orders:    glipiZIDE (Glucotrol) 10 mg tablet; Take 1 tablet (10 mg) by mouth once daily.    CBC; Future    Comprehensive Metabolic Panel; Future    Hepatitis C Antibody; Future    Lipid Panel; Future    TSH with reflex to Free T4 if abnormal; Future    Vitamin B12; Future    Hemoglobin A1C; Future    Albumin-Creatinine Ratio, Urine Random; Future    Vitamin D 25-Hydroxy,Total (for eval of Vitamin D levels); Future    Follow Up In [facility] - PCP - Established; Future    Mixed  hyperlipidemia    Orders:    rosuvastatin (Crestor) 20 mg tablet; Take 1 tablet (20 mg) by mouth once daily.    CBC; Future    Comprehensive Metabolic Panel; Future    Hepatitis C Antibody; Future    Lipid Panel; Future    TSH with reflex to Free T4 if abnormal; Future    Vitamin B12; Future    Hemoglobin A1C; Future    Albumin-Creatinine Ratio, Urine Random; Future    Vitamin D 25-Hydroxy,Total (for eval of Vitamin D levels); Future    Follow Up In [facility] - PCP - Established; Future    Benign essential hypertension    Orders:    CBC; Future    Comprehensive Metabolic Panel; Future    Hepatitis C Antibody; Future    Lipid Panel; Future    TSH with reflex to Free T4 if abnormal; Future    Vitamin B12; Future    Hemoglobin A1C; Future    Albumin-Creatinine Ratio, Urine Random; Future    Vitamin D 25-Hydroxy,Total (for eval of Vitamin D levels); Future    Follow Up In [facility] - PCP  Established; Future    Stage 3 chronic kidney disease, unspecified whether stage 3a or 3b CKD (Multi)    Orders:    CBC; Future    Comprehensive Metabolic Panel; Future    Hepatitis C Antibody; Future    Lipid Panel; Future    TSH with reflex to Free T4 if abnormal; Future    Vitamin B12; Future    Hemoglobin A1C; Future    Albumin-Creatinine Ratio, Urine Random; Future    Vitamin D 25-Hydroxy,Total (for eval of Vitamin D levels); Future    Follow Up In [facility] - PCP  Established; Future    Vitamin D deficiency    Orders:    CBC; Future    Comprehensive Metabolic Panel; Future    Hepatitis C Antibody; Future    Lipid Panel; Future    TSH with reflex to Free T4 if abnormal; Future    Vitamin B12; Future    Hemoglobin A1C; Future    Albumin-Creatinine Ratio, Urine Random; Future    Vitamin D 25-Hydroxy,Total (for eval of Vitamin D levels); Future    Follow Up In [facility] - PCP  Established; Future    Morbid obesity (Multi)    Orders:    Follow Up In [facility]  Established; Future    Hypersomnia    Orders:    Home sleep apnea test (HSAT); Future    Follow Up In [facility] - PCP - Established; Future    Snoring    Orders:    Home sleep apnea test (HSAT); Future    Follow Up In [facility] - PCP - Established; Future    Medicare annual wellness visit, subsequent         Encounter to establish care                  Updated lab work ordered. Will follow up pending results.     Diabetes: Most recent 6.1%. Lifestyle changes recommended: Diet consisting of low fat foods, lean meats, high fiber, fresh fruits and vegetables. 150 min/ weekly aerobic exercise. Encouraged updated Vision exams. Has supplies at home to monitor his blood sugar.   Atherosclerosis, Hypertension, Aneurysm of Ascending Aorta: continue to follow with Cardiology as previously determined. Continue medications as prescribed. Controlled at this time.   Hyperlipidemia: Continue Rosuvastatin. Controlled on last blood work. Continue to monitor.   Obesity: BMI: 36.62. Lifestyle changes recommended: Diet consisting of low fat foods, lean meats, high fiber, fresh fruits and vegetables. 150 min/ weekly aerobic exercise.   CKD: Encourage adequate water intake. Avoid NSAIDs. Has an appointment to establish with Nephrology.  Vitamin D Deficiency: Vitamin D. Reevaluate with next lab work.    Medicare Wellness: Routine and age appropriate recommendations discussed with the patient today and patient verbalized understanding of the recommendations.  Questions answered.  Age appropriate immunizations and preventative screenings discussed with the patient and ordered as appropriate. Labs updated and ordered as indicated. Recommend healthy diet and daily exercise to maintain healthy body weight. 5 minutes  were spent screening for depression using PHQ2/PHQ9 as documented in the chart.   Hypersomnia, Snoring: Golden Scale: 17. HSAT ordered. Will follow up pending results.     Medicare Wellness: February 2025.    Flu Vaccine: September 2021.   Pneumovax 23: January 2008.   Prevnar 20: February 2025.   Prostate Screening: May 2024, 0.56.   Flu Vaccine: February 2025.   Tdap: January 2008.

## 2025-03-07 ENCOUNTER — PROCEDURE VISIT (OUTPATIENT)
Dept: SLEEP MEDICINE | Facility: HOSPITAL | Age: 70
End: 2025-03-07
Payer: MEDICARE

## 2025-03-07 DIAGNOSIS — R06.83 SNORING: ICD-10-CM

## 2025-03-07 DIAGNOSIS — G47.10 HYPERSOMNIA: ICD-10-CM

## 2025-03-07 PROCEDURE — 95806 SLEEP STUDY UNATT&RESP EFFT: CPT | Performed by: INTERNAL MEDICINE

## 2025-03-10 DIAGNOSIS — I25.10 ATHEROSCLEROSIS OF NATIVE CORONARY ARTERY OF NATIVE HEART WITHOUT ANGINA PECTORIS: ICD-10-CM

## 2025-03-10 DIAGNOSIS — E78.2 MIXED HYPERLIPIDEMIA: ICD-10-CM

## 2025-03-10 DIAGNOSIS — Z86.79 HISTORY OF HYPERTENSION: ICD-10-CM

## 2025-03-10 DIAGNOSIS — I10 PRIMARY HYPERTENSION: ICD-10-CM

## 2025-03-10 DIAGNOSIS — I71.21 ANEURYSM OF ASCENDING AORTA WITHOUT RUPTURE (CMS-HCC): ICD-10-CM

## 2025-03-10 RX ORDER — LOSARTAN POTASSIUM 100 MG/1
100 TABLET ORAL DAILY
Qty: 90 TABLET | Refills: 3 | Status: SHIPPED | OUTPATIENT
Start: 2025-03-10 | End: 2026-03-10

## 2025-03-10 RX ORDER — HYDROCHLOROTHIAZIDE 25 MG/1
25 TABLET ORAL DAILY
Qty: 90 TABLET | Refills: 3 | Status: SHIPPED | OUTPATIENT
Start: 2025-03-10 | End: 2026-03-10

## 2025-03-10 RX ORDER — AMLODIPINE BESYLATE 5 MG/1
5 TABLET ORAL DAILY
Qty: 90 TABLET | Refills: 3 | Status: SHIPPED | OUTPATIENT
Start: 2025-03-10 | End: 2026-03-10

## 2025-03-17 ENCOUNTER — TELEPHONE (OUTPATIENT)
Dept: CARDIOLOGY | Facility: CLINIC | Age: 70
End: 2025-03-17
Payer: MEDICARE

## 2025-03-17 DIAGNOSIS — E78.2 MIXED HYPERLIPIDEMIA: ICD-10-CM

## 2025-03-17 DIAGNOSIS — I25.10 ATHEROSCLEROSIS OF NATIVE CORONARY ARTERY OF NATIVE HEART WITHOUT ANGINA PECTORIS: ICD-10-CM

## 2025-03-17 DIAGNOSIS — I10 PRIMARY HYPERTENSION: ICD-10-CM

## 2025-03-17 DIAGNOSIS — I71.21 ANEURYSM OF ASCENDING AORTA WITHOUT RUPTURE (CMS-HCC): ICD-10-CM

## 2025-03-17 DIAGNOSIS — Z86.79 HISTORY OF HYPERTENSION: ICD-10-CM

## 2025-03-17 RX ORDER — METOPROLOL TARTRATE 50 MG/1
75 TABLET ORAL 2 TIMES DAILY
Qty: 21 TABLET | Refills: 0 | Status: SHIPPED | OUTPATIENT
Start: 2025-03-17 | End: 2025-03-24

## 2025-03-17 RX ORDER — HYDROCHLOROTHIAZIDE 25 MG/1
25 TABLET ORAL DAILY
Qty: 7 TABLET | Refills: 0 | Status: SHIPPED | OUTPATIENT
Start: 2025-03-17 | End: 2025-03-31

## 2025-03-24 DIAGNOSIS — I25.10 ATHEROSCLEROSIS OF NATIVE CORONARY ARTERY OF NATIVE HEART WITHOUT ANGINA PECTORIS: ICD-10-CM

## 2025-03-24 DIAGNOSIS — I71.21 ANEURYSM OF ASCENDING AORTA WITHOUT RUPTURE: ICD-10-CM

## 2025-03-24 DIAGNOSIS — E78.2 MIXED HYPERLIPIDEMIA: ICD-10-CM

## 2025-03-24 DIAGNOSIS — Z86.79 HISTORY OF HYPERTENSION: ICD-10-CM

## 2025-03-24 RX ORDER — HYDROCHLOROTHIAZIDE 25 MG/1
25 TABLET ORAL DAILY
Qty: 90 TABLET | Refills: 3 | Status: SHIPPED | OUTPATIENT
Start: 2025-03-24

## 2025-04-18 ENCOUNTER — APPOINTMENT (OUTPATIENT)
Facility: CLINIC | Age: 70
End: 2025-04-18
Payer: MEDICARE

## 2025-05-28 ENCOUNTER — APPOINTMENT (OUTPATIENT)
Dept: CARDIOLOGY | Facility: HOSPITAL | Age: 70
End: 2025-05-28
Payer: MEDICARE

## 2025-06-26 ENCOUNTER — APPOINTMENT (OUTPATIENT)
Dept: PRIMARY CARE | Facility: CLINIC | Age: 70
End: 2025-06-26
Payer: MEDICARE

## 2025-07-01 ENCOUNTER — APPOINTMENT (OUTPATIENT)
Dept: CARDIOLOGY | Facility: CLINIC | Age: 70
End: 2025-07-01
Payer: MEDICARE

## (undated) DEVICE — TIP, SUCTION, YANKAUER, BULB, ADULT

## (undated) DEVICE — DRESSING, WOUND, POST OP, 10 X 4

## (undated) DEVICE — SHEATH, GLIDESHEATH, SLENDER, 6FR 10CM

## (undated) DEVICE — GUIDEWIRE, INQUIRE, J TIP, .035 X 210CM, FIXED CORE, DIAGNOSTIC

## (undated) DEVICE — Device

## (undated) DEVICE — CLOSURE DEVICE, VASCULAR, ANGIO-SEAL, VIP, 6FR, LF

## (undated) DEVICE — SPONGE, LAP, XRAY DECT, 18IN X 18IN, W/MASTER DMT, STERILE

## (undated) DEVICE — SUTURE, SILK, 3-0, 30 IN, BR SH, BLACK

## (undated) DEVICE — CATHETER, OPTITORQUE, 6FR, JACKY, BL3.5/2H/100CM

## (undated) DEVICE — STAPLER, ECHELON FLEX TM POWERED PLUS, 45MM, DISP

## (undated) DEVICE — TRAY, SURESTEP, URINE METER, 16FR, COMPLETE, W/STATLOCK

## (undated) DEVICE — ACCESS KIT, S-MAK MINI, 4FR 10CM 0.018IN 40CM, NT/PT, ECHO ENHANCE NEEDLE

## (undated) DEVICE — GLOVE, SURGICAL, PROTEXIS PI BLUE W/NEUTHERA, 7.5, PF, LF

## (undated) DEVICE — COVER HANDLE LIGHT, STERIS, BLUE, STERILE

## (undated) DEVICE — GUIDEWIRE, NITINOL, 0.018 IN X 80CM, PLATINUM TIP

## (undated) DEVICE — HEMOSTAT, ARISTA, ABSORBABLE, 3 GRAMS

## (undated) DEVICE — APPLICATOR, CHLORAPREP, W/ORANGE TINT, 26ML

## (undated) DEVICE — LIGASURE IMPACT, 18CM

## (undated) DEVICE — STAPLER, ENDO ECHELON 45MM RELOAD, WHITE, REUSABLE

## (undated) DEVICE — DEVICE, FORCE TRIVERSE ELECTROSURGICAL

## (undated) DEVICE — ELECTRODE, ELECTROSURGICAL, LAPAROSCOPIC, L HOOK TIP, 36 IN

## (undated) DEVICE — TISSUE ADHESIVE, PREMIERPRO EXOFIN, PRECISION PEN HV, 1.0ML

## (undated) DEVICE — CATHETER, DIAGNOSTIC, DXTERITY, 6FR JR 4.0, 100CM

## (undated) DEVICE — SUTURE, MONOCRYL, 4-0, 18 IN, PS2, UNDYED

## (undated) DEVICE — TROCAR, BLUNT TIP, KII, W/SEAL, 12MM X 100MM

## (undated) DEVICE — IRRIGATION TRAY, SYRINGE, 60 CC, BULB

## (undated) DEVICE — SUTURE, PDS II, 0, 60 IN, TP1, VIOLET

## (undated) DEVICE — STAPLER, SKIN, MULTIFIRE, PREMIUM, WIDE, 35 W

## (undated) DEVICE — CATHETER, DIAGNOSTIC, DXTERITY, 6 FR, JL 4.0, 100C

## (undated) DEVICE — HANDPIECE, POOLE SUCTION, W/O TUBING

## (undated) DEVICE — SUTURE, VICRYL, 0, 27 IN, UR-6, VIOLET

## (undated) DEVICE — RETRIEVAL SYSTEM, MONARCH, 10MM DISP ENDOSCOPIC

## (undated) DEVICE — SLEEVE, KII, Z-THREAD, 5X100CM

## (undated) DEVICE — ASSEMBLY, STRYKER FLOW 2, SUCTION IRRIGATOR, WITH TIP

## (undated) DEVICE — TUBING, NON COND, 6MM X 20

## (undated) DEVICE — 18MM, DEROYAL, LAPAROSCOPIC DISSECTOR, 5MM KITTNER TIP, 45CM SHAFT, STERILE

## (undated) DEVICE — SHEATH, PINNACLE, 10 CM,  6FR INTRODUCER, 6FR DIA, 2.5 CM DIALATOR

## (undated) DEVICE — TROCAR, KII OPTICAL BLADELESS 5MM Z THREAD 100MM LNGTH

## (undated) DEVICE — PAD, GROUNDING, ELECTROSURGICAL, W/9 FT CABLE, POLYHESIVE II, ADULT, LF